# Patient Record
Sex: MALE | Race: WHITE | NOT HISPANIC OR LATINO | Employment: FULL TIME | URBAN - METROPOLITAN AREA
[De-identification: names, ages, dates, MRNs, and addresses within clinical notes are randomized per-mention and may not be internally consistent; named-entity substitution may affect disease eponyms.]

---

## 2017-10-16 ENCOUNTER — OFFICE VISIT (OUTPATIENT)
Dept: FAMILY MEDICINE CLINIC | Facility: CLINIC | Age: 37
End: 2017-10-16

## 2017-10-16 VITALS
HEIGHT: 66 IN | TEMPERATURE: 97.3 F | SYSTOLIC BLOOD PRESSURE: 122 MMHG | BODY MASS INDEX: 33.35 KG/M2 | OXYGEN SATURATION: 97 % | HEART RATE: 85 BPM | DIASTOLIC BLOOD PRESSURE: 86 MMHG | WEIGHT: 207.5 LBS | RESPIRATION RATE: 16 BRPM

## 2017-10-16 DIAGNOSIS — M54.41 CHRONIC BILATERAL LOW BACK PAIN WITH RIGHT-SIDED SCIATICA: Primary | ICD-10-CM

## 2017-10-16 DIAGNOSIS — M79.604 RIGHT LEG PAIN: ICD-10-CM

## 2017-10-16 DIAGNOSIS — G89.29 CHRONIC BILATERAL LOW BACK PAIN WITH RIGHT-SIDED SCIATICA: Primary | ICD-10-CM

## 2017-10-16 PROCEDURE — 99213 OFFICE O/P EST LOW 20 MIN: CPT | Performed by: FAMILY MEDICINE

## 2017-10-16 RX ORDER — NAPROXEN 500 MG/1
500 TABLET ORAL 2 TIMES DAILY WITH MEALS
Qty: 40 TABLET | Refills: 2 | Status: SHIPPED | OUTPATIENT
Start: 2017-10-16 | End: 2017-12-16 | Stop reason: HOSPADM

## 2017-10-16 RX ORDER — CYCLOBENZAPRINE HCL 10 MG
5-10 TABLET ORAL NIGHTLY PRN
Qty: 30 TABLET | Refills: 1 | Status: SHIPPED | OUTPATIENT
Start: 2017-10-16 | End: 2017-12-10

## 2017-10-16 NOTE — PROGRESS NOTES
Assessment/Plan     Problem List Items Addressed This Visit     None      Visit Diagnoses     Chronic bilateral low back pain with right-sided sciatica    -  Primary    Relevant Medications    naproxen (NAPROSYN) 500 MG tablet    cyclobenzaprine (FLEXERIL) 10 MG tablet    Other Relevant Orders    MRI Lumbar Spine Without Contrast    Right leg pain        Relevant Medications    naproxen (NAPROSYN) 500 MG tablet    cyclobenzaprine (FLEXERIL) 10 MG tablet    Other Relevant Orders    MRI Lumbar Spine Without Contrast           Follow up: Return if symptoms worsen or fail to improve.     DISCUSSION  Given chronicity for pain x 1 year and increasing and failure of Ibuprofen for that time and more consistent for 2 months, rec check MRI of lumbar spine.   Naproxen 500 mg BID and flexeril 5-10 mg at hs. Call if not improving or to ER if develops incontinence.     He agrees      MEDICATIONS PRESCRIBED  Requested Prescriptions     Signed Prescriptions Disp Refills   • naproxen (NAPROSYN) 500 MG tablet 40 tablet 2     Sig: Take 1 tablet by mouth 2 (Two) Times a Day With Meals.   • cyclobenzaprine (FLEXERIL) 10 MG tablet 30 tablet 1     Sig: Take 0.5-1 tablets by mouth At Night As Needed for Muscle Spasms. back pain          -------------------------------------------    Subjective     Chief Complaint   Patient presents with   • Back Pain     pt states its getting worse and he is starting to feel it in his legs now.        Back Pain   This is a chronic problem. The current episode started more than 1 year ago (no injury, had been off and on and now daily./ ). The problem occurs constantly. The problem has been gradually worsening since onset. The pain is present in the lumbar spine. The quality of the pain is described as burning. The pain radiates to the right foot and right knee (both legs and right leg is worse.). The pain is moderate. Exacerbated by: depends on move. Associated symptoms include leg pain. Pertinent  "negatives include no bladder incontinence, bowel incontinence, numbness, tingling or weakness. He has tried NSAIDs (600-800 mg Ibuprofen 2 times per day, no P T  eval) for the symptoms. Improvement on treatment: Ibuprofen is not helping anymore.        No wt loss  Wakes him up and decreased sleep due to pain     Ibuprofen use for one yr and still hurting  Daily use of Ibuprofen 2 months      Past Medical History,Medications, Allergies, and social history was reviewed.    Review of Systems   Constitutional: Negative.    HENT: Negative.    Respiratory: Negative.    Cardiovascular: Negative.    Gastrointestinal: Negative.  Negative for bowel incontinence.   Genitourinary: Negative for bladder incontinence.   Musculoskeletal: Positive for arthralgias, back pain and myalgias.   Neurological: Negative.  Negative for tingling, weakness and numbness.   Psychiatric/Behavioral: Negative.        Objective     Vitals:    10/16/17 1505   BP: 122/86   Pulse: 85   Resp: 16   Temp: 97.3 °F (36.3 °C)   TempSrc: Temporal Artery    SpO2: 97%   Weight: 207 lb 8 oz (94.1 kg)   Height: 66\" (167.6 cm)        Physical Exam   Constitutional: He is oriented to person, place, and time. Vital signs are normal. He appears well-developed and well-nourished.   HENT:   Head: Normocephalic and atraumatic.   Right Ear: Hearing, tympanic membrane, external ear and ear canal normal.   Left Ear: Hearing, tympanic membrane, external ear and ear canal normal.   Mouth/Throat: Oropharynx is clear and moist.   Eyes: Conjunctivae, EOM and lids are normal. Pupils are equal, round, and reactive to light.   Neck: Normal range of motion. Neck supple. No thyromegaly present.   Cardiovascular: Normal rate, regular rhythm and normal heart sounds.  Exam reveals no friction rub.    No murmur heard.  Pulmonary/Chest: Effort normal and breath sounds normal. No respiratory distress. He has no wheezes. He has no rales.   Abdominal: Normal appearance and bowel sounds are " normal.   Musculoskeletal: He exhibits no edema.        Lumbar back: He exhibits decreased range of motion and tenderness.   Neurological: He is alert and oriented to person, place, and time. He has normal strength. He exhibits normal muscle tone. Gait (antalgic) abnormal.   Reflex Scores:       Patellar reflexes are 2+ on the right side and 2+ on the left side.  SLR increased pain in low back.    Skin: Skin is warm and dry.   Psychiatric: He has a normal mood and affect. His speech is normal and behavior is normal. Cognition and memory are normal.   Nursing note and vitals reviewed.              Sanjay New MD

## 2017-10-23 ENCOUNTER — HOSPITAL ENCOUNTER (OUTPATIENT)
Dept: MRI IMAGING | Facility: HOSPITAL | Age: 37
Discharge: HOME | End: 2017-10-23
Admitting: FAMILY MEDICINE

## 2017-10-23 DIAGNOSIS — G89.29 CHRONIC BILATERAL LOW BACK PAIN WITH RIGHT-SIDED SCIATICA: ICD-10-CM

## 2017-10-23 DIAGNOSIS — M54.41 CHRONIC BILATERAL LOW BACK PAIN WITH RIGHT-SIDED SCIATICA: ICD-10-CM

## 2017-10-23 DIAGNOSIS — M79.604 RIGHT LEG PAIN: ICD-10-CM

## 2017-10-23 PROCEDURE — 72148 MRI LUMBAR SPINE W/O DYE: CPT

## 2017-10-24 DIAGNOSIS — M51.36 BULGING LUMBAR DISC: Primary | ICD-10-CM

## 2017-10-25 ENCOUNTER — TELEPHONE (OUTPATIENT)
Dept: FAMILY MEDICINE CLINIC | Facility: CLINIC | Age: 37
End: 2017-10-25

## 2017-10-25 NOTE — TELEPHONE ENCOUNTER
----- Message from Gissel Man sent at 10/25/2017  9:19 AM EDT -----  Contact: VINICIO  PATIENT SAID HE GOT A CALL LAST NIGHT WITH RESULTS. HE IS GOING TO BE REFERRED TO A NEUROLOGY.    ASKING IF YOU CAN PRESCRIBE SOMETHING ELSE FOR PAIN, WHAT HE IS ON IS JUST NOT EVEN TOUCHING IT       [facility] IN [place]

## 2017-11-02 ENCOUNTER — TELEPHONE (OUTPATIENT)
Dept: FAMILY MEDICINE CLINIC | Facility: CLINIC | Age: 37
End: 2017-11-02

## 2017-11-02 NOTE — TELEPHONE ENCOUNTER
SPOKE WITH PT AND HE HASN'T EVER TAKEN THIS MED AND WOULD LIKE TO TRY AND PT STATES THAT HE WILL WAIT FOR REFERRAL APPT. INFORMED HIM IT WAS PLACED TODAY AND IT WILL TAKE UP TO WEEK BEFORE HE HEARS SOMETHING AND THEN HE CAN CALL US IF HE DOESN'T HEAR FROM US BY NEXT WK THIS TIME.

## 2017-11-02 NOTE — TELEPHONE ENCOUNTER
----- Message from Dolly Mcfadden sent at 11/2/2017  8:16 AM EDT -----  Contact: New  Patient would like something different prescribed for his back pain. The current medication is not cutting it. He states he cannot take the muscle relaxers because they knock him out for a day and a half. Patient states he was to be referred to a neurosurgeon but has not heard anything regarding an appointment. Please call patient at [phone].

## 2017-11-02 NOTE — TELEPHONE ENCOUNTER
Please call.  Has he ever tried a pain medication called tramadol?  It is a mild painkiller that might be helpful.  However, it could cause him to be a little bit sleepy.  If he would like to try, okay for tramadol 50 mg 1 by mouth every 6 hours as needed for pain #20.  Please call in.  If he is ever had a seizure, do not call him and let me know.    Also, I will be following up on the referral for neurosurgery.

## 2017-11-15 ENCOUNTER — TELEPHONE (OUTPATIENT)
Dept: FAMILY MEDICINE CLINIC | Facility: CLINIC | Age: 37
End: 2017-11-15

## 2017-11-15 NOTE — TELEPHONE ENCOUNTER
----- Message from Miguel Mora sent at 11/15/2017  1:47 PM EST -----  Contact: PATIENT  PATIENT SAID THAT HE WAS SUPPOSED TO HAVE SOMETHING ELSE CALLED IN FOR HIS BACK. PATIENT HAD NAPROXEN PRESCRIBED INITIALLY. A GOOD CALL BACK NUMBER -[phone]

## 2017-11-16 NOTE — TELEPHONE ENCOUNTER
See message from November 2.  Was supposed to have tramadol called in.  Was this called in?  If not, please call in tramadol 50 mg 1 every 6 hours as needed for pain #20

## 2017-11-20 ENCOUNTER — OFFICE VISIT (OUTPATIENT)
Dept: NEUROSURGERY | Facility: CLINIC | Age: 37
End: 2017-11-20

## 2017-11-20 VITALS
WEIGHT: 211 LBS | HEIGHT: 66 IN | TEMPERATURE: 97.9 F | DIASTOLIC BLOOD PRESSURE: 60 MMHG | SYSTOLIC BLOOD PRESSURE: 100 MMHG | BODY MASS INDEX: 33.91 KG/M2

## 2017-11-20 DIAGNOSIS — M43.06 PARS DEFECT OF LUMBAR SPINE: Primary | ICD-10-CM

## 2017-11-20 DIAGNOSIS — M43.17 SPONDYLOLISTHESIS AT L5-S1 LEVEL: ICD-10-CM

## 2017-11-20 PROCEDURE — 99244 OFF/OP CNSLTJ NEW/EST MOD 40: CPT | Performed by: NEUROLOGICAL SURGERY

## 2017-11-20 RX ORDER — IBUPROFEN 200 MG
800 TABLET ORAL ONCE
Status: CANCELLED | OUTPATIENT
Start: 2017-11-20 | End: 2017-11-20

## 2017-11-20 RX ORDER — PREGABALIN 75 MG/1
150 CAPSULE ORAL ONCE
Status: CANCELLED | OUTPATIENT
Start: 2017-11-20 | End: 2017-11-20

## 2017-11-20 RX ORDER — ACETAMINOPHEN 325 MG/1
1000 TABLET ORAL ONCE
Status: CANCELLED | OUTPATIENT
Start: 2017-11-20 | End: 2017-11-20

## 2017-11-20 RX ORDER — OXYCODONE HCL 10 MG/1
10 TABLET, FILM COATED, EXTENDED RELEASE ORAL ONCE
Status: CANCELLED | OUTPATIENT
Start: 2017-11-20 | End: 2017-11-20

## 2017-11-20 RX ORDER — SODIUM CHLORIDE 0.9 % (FLUSH) 0.9 %
1-10 SYRINGE (ML) INJECTION AS NEEDED
Status: CANCELLED | OUTPATIENT
Start: 2017-11-20

## 2017-11-20 RX ORDER — CHLORHEXIDINE GLUCONATE 4 G/100ML
SOLUTION TOPICAL
Qty: 120 ML | Refills: 0 | Status: SHIPPED | OUTPATIENT
Start: 2017-11-20 | End: 2017-12-16 | Stop reason: HOSPADM

## 2017-11-20 NOTE — PROGRESS NOTES
Subjective     Chief Complaint: Low back pain    Patient ID: Alvin Gordon is a 37 y.o. male seen for consultation today at the request of  Sanjay New MD    Back Pain   This is a chronic problem. The current episode started more than 1 year ago. The problem occurs constantly. The problem has been gradually worsening since onset. The pain is present in the lumbar spine and sacro-iliac. The quality of the pain is described as aching. The pain radiates to the right foot and left foot. The pain is at a severity of 8/10. The pain is severe. The pain is the same all the time. The symptoms are aggravated by lying down, bending, position, standing and twisting. Stiffness is present all day. Associated symptoms include headaches and leg pain. Pertinent negatives include no abdominal pain, bladder incontinence, bowel incontinence, chest pain, dysuria, fever, numbness, paresis, paresthesias, pelvic pain, perianal numbness, tingling, weakness or weight loss. He has tried analgesics, chiropractic manipulation, heat and NSAIDs for the symptoms. The treatment provided no relief.       This is a 37-year-old man who presents to my clinic with a long-standing history of progressive low back pain.  He has tried home exercises and anti-inflammatories.  These have been ineffective in alleviating his back pain.  He is recently reporting some leg pain that has become progressive.  His pain is affecting his ability to work and sleep.  He reports that his quality of life is diminished as a result of his refractory, chronic low back pain.    The following portions of the patient's history were reviewed and updated as appropriate: allergies, current medications, past family history, past medical history, past social history, past surgical history and problem list.    Family history:   Family History   Problem Relation Age of Onset   • No Known Problems Mother    • No Known Problems Father        Social history:   Social History      Social History   • Marital status:      Spouse name: N/A   • Number of children: N/A   • Years of education: N/A     Occupational History   • Not on file.     Social History Main Topics   • Smoking status: Never Smoker   • Smokeless tobacco: Never Used   • Alcohol use Yes      Comment: occasionally   • Drug use: No   • Sexual activity: Not on file     Other Topics Concern   • Not on file     Social History Narrative       Review of Systems   Constitutional: Negative for activity change, appetite change, chills, diaphoresis, fatigue, fever, unexpected weight change and weight loss.   HENT: Negative for congestion, dental problem, drooling, ear discharge, ear pain, facial swelling, hearing loss, mouth sores, nosebleeds, postnasal drip, rhinorrhea, sinus pressure, sneezing, sore throat, tinnitus, trouble swallowing and voice change.    Eyes: Negative for photophobia, pain, discharge, redness, itching and visual disturbance.   Respiratory: Negative for apnea, cough, choking, chest tightness, shortness of breath, wheezing and stridor.    Cardiovascular: Negative for chest pain, palpitations and leg swelling.   Gastrointestinal: Negative for abdominal distention, abdominal pain, anal bleeding, blood in stool, bowel incontinence, constipation, diarrhea, nausea, rectal pain and vomiting.   Endocrine: Negative for cold intolerance, heat intolerance, polydipsia, polyphagia and polyuria.   Genitourinary: Negative for bladder incontinence, decreased urine volume, difficulty urinating, dysuria, enuresis, flank pain, frequency, genital sores, hematuria, pelvic pain and urgency.   Musculoskeletal: Positive for arthralgias, back pain and myalgias. Negative for gait problem, joint swelling, neck pain and neck stiffness.   Skin: Negative for color change, pallor, rash and wound.   Allergic/Immunologic: Negative for environmental allergies, food allergies and immunocompromised state.   Neurological: Positive for headaches.  "Negative for dizziness, tingling, tremors, seizures, syncope, facial asymmetry, speech difficulty, weakness, light-headedness, numbness and paresthesias.   Hematological: Negative for adenopathy. Does not bruise/bleed easily.   Psychiatric/Behavioral: Negative for agitation, behavioral problems, confusion, decreased concentration, dysphoric mood, hallucinations, self-injury, sleep disturbance and suicidal ideas. The patient is not nervous/anxious and is not hyperactive.    All other systems reviewed and are negative.      Objective   Blood pressure 100/60, temperature 97.9 °F (36.6 °C), height 66\" (167.6 cm), weight 211 lb (95.7 kg).  Body mass index is 34.06 kg/(m^2).    Physical Exam   Constitutional: He is oriented to person, place, and time. He appears well-developed.  Non-toxic appearance.   HENT:   Head: Normocephalic and atraumatic.   Right Ear: Hearing normal.   Left Ear: Hearing normal.   Nose: Nose normal.   Eyes: Conjunctivae, EOM and lids are normal. Pupils are equal, round, and reactive to light.   Neck: Normal range of motion. No JVD present.   Cardiovascular: Normal rate and regular rhythm.    Pulses:       Radial pulses are 2+ on the right side, and 2+ on the left side.   Pulmonary/Chest: Effort normal. No stridor. No respiratory distress. He has no wheezes.   Musculoskeletal:        Thoracic back: He exhibits no tenderness, no swelling, no pain and no spasm.        Lumbar back: He exhibits decreased range of motion, tenderness and pain. He exhibits no bony tenderness, no swelling and no spasm.   Muscle Group    L          R  Hip Flexor          5          5  Knee Extensor  5          5  ADF                   5          5  APF                   5          5  EHL                   5          5   Neurological: He is alert and oriented to person, place, and time. He has normal strength. He displays normal reflexes. No cranial nerve deficit or sensory deficit. He exhibits normal muscle tone. He displays " a negative Romberg sign. Coordination and gait normal. GCS eye subscore is 4. GCS verbal subscore is 5. GCS motor subscore is 6.   Reflex Scores:       Tricep reflexes are 2+ on the right side and 2+ on the left side.       Bicep reflexes are 2+ on the right side and 2+ on the left side.       Brachioradialis reflexes are 2+ on the right side and 2+ on the left side.       Patellar reflexes are 2+ on the right side and 2+ on the left side.       Achilles reflexes are 1+ on the right side and 1+ on the left side.  Skin: Skin is warm and dry. No rash noted. No erythema.   Psychiatric: He has a normal mood and affect. His behavior is normal. Judgment and thought content normal.   Nursing note and vitals reviewed.        Assessment/Plan     Independent Review of Radiographic Studies:      Available for my review is a MRI of the lumbar spine performed on 10/23/17.  This discloses a grade 2 spondylolisthesis of L5 on S1.  The spondylolisthesis measures approximately 13 mm.  There are bilateral pars defects at L5-S1.  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  There is severe neural foraminal stenosis at L5-S1 bilaterally.    Medical Decision Making:      This is a 37-year-old man with a grade 2 isthmic spondylolisthesis.  He is a candidate for an L5-S1 fusion.  He is interested in surgery.  He has failed conservative treatment area did    Informed consent for an L5-S1 PLIF was obtained from the patient.  He acknowledges risk of nerve root injury, paralysis, weakness, loss of sensation, permanent neurologic deficit, bleeding, infection, spinal fluid leak, iatrogenic instability, failure of benefit of the operation, or need for additional procedures.  All questions were answered.  No guarantees are given or implied.  The patient elects proceed with surgery.    We will schedule him for an L5-S1 PLIF on an elective basis in the near future.    Alvin was seen today for back pain and leg  pain.    Diagnoses and all orders for this visit:    Pars defect of lumbar spine  -     CT Lumbar Spine Without Contrast; Future    Spondylolisthesis at L5-S1 level  -     CT Lumbar Spine Without Contrast; Future      No Follow-up on file.           This document signed by SHEN Jacinto MD November 20, 2017 4:36 PM

## 2017-11-21 PROBLEM — M43.06 PARS DEFECT OF LUMBAR SPINE: Status: ACTIVE | Noted: 2017-11-21

## 2017-11-27 ENCOUNTER — HOSPITAL ENCOUNTER (OUTPATIENT)
Dept: CT IMAGING | Facility: HOSPITAL | Age: 37
Discharge: HOME | End: 2017-11-27
Attending: NEUROLOGICAL SURGERY | Admitting: NEUROLOGICAL SURGERY

## 2017-11-27 DIAGNOSIS — M43.06 PARS DEFECT OF LUMBAR SPINE: ICD-10-CM

## 2017-11-27 DIAGNOSIS — M43.17 SPONDYLOLISTHESIS AT L5-S1 LEVEL: ICD-10-CM

## 2017-11-27 PROCEDURE — 72131 CT LUMBAR SPINE W/O DYE: CPT

## 2017-12-10 ENCOUNTER — APPOINTMENT (OUTPATIENT)
Dept: PREADMISSION TESTING | Facility: HOSPITAL | Age: 37
End: 2017-12-10

## 2017-12-10 VITALS — BODY MASS INDEX: 32.95 KG/M2 | HEIGHT: 66 IN | WEIGHT: 205.03 LBS

## 2017-12-10 DIAGNOSIS — M43.06 PARS DEFECT OF LUMBAR SPINE: ICD-10-CM

## 2017-12-10 LAB
ABO GROUP BLD: NORMAL
ANION GAP SERPL CALCULATED.3IONS-SCNC: 8 MMOL/L (ref 3–11)
BILIRUB UR QL STRIP: NEGATIVE
BLD GP AB SCN SERPL QL: NEGATIVE
BUN BLD-MCNC: 16 MG/DL (ref 9–23)
BUN/CREAT SERPL: 11.4 (ref 7–25)
CALCIUM SPEC-SCNC: 8.8 MG/DL (ref 8.7–10.4)
CHLORIDE SERPL-SCNC: 102 MMOL/L (ref 99–109)
CLARITY UR: CLEAR
CO2 SERPL-SCNC: 28 MMOL/L (ref 20–31)
COLOR UR: YELLOW
CREAT BLD-MCNC: 1.4 MG/DL (ref 0.6–1.3)
DEPRECATED RDW RBC AUTO: 41.3 FL (ref 37–54)
ERYTHROCYTE [DISTWIDTH] IN BLOOD BY AUTOMATED COUNT: 12.7 % (ref 11.3–14.5)
GFR SERPL CREATININE-BSD FRML MDRD: 57 ML/MIN/1.73
GLUCOSE BLD-MCNC: 138 MG/DL (ref 70–100)
GLUCOSE UR STRIP-MCNC: NEGATIVE MG/DL
HBA1C MFR BLD: 5.6 % (ref 4.8–5.6)
HCT VFR BLD AUTO: 46.3 % (ref 38.9–50.9)
HGB BLD-MCNC: 16.6 G/DL (ref 13.1–17.5)
HGB UR QL STRIP.AUTO: NEGATIVE
KETONES UR QL STRIP: NEGATIVE
LEUKOCYTE ESTERASE UR QL STRIP.AUTO: NEGATIVE
MCH RBC QN AUTO: 31.9 PG (ref 27–31)
MCHC RBC AUTO-ENTMCNC: 35.9 G/DL (ref 32–36)
MCV RBC AUTO: 88.9 FL (ref 80–99)
MRSA DNA SPEC QL NAA+PROBE: NEGATIVE
NITRITE UR QL STRIP: NEGATIVE
PH UR STRIP.AUTO: <=5 [PH] (ref 5–8)
PLATELET # BLD AUTO: 209 10*3/MM3 (ref 150–450)
PMV BLD AUTO: 10 FL (ref 6–12)
POTASSIUM BLD-SCNC: 4.2 MMOL/L (ref 3.5–5.5)
PROT UR QL STRIP: ABNORMAL
RBC # BLD AUTO: 5.21 10*6/MM3 (ref 4.2–5.76)
RH BLD: POSITIVE
SODIUM BLD-SCNC: 138 MMOL/L (ref 132–146)
SP GR UR STRIP: 1.02 (ref 1–1.03)
UROBILINOGEN UR QL STRIP: ABNORMAL
WBC NRBC COR # BLD: 7.27 10*3/MM3 (ref 3.5–10.8)

## 2017-12-10 PROCEDURE — 80048 BASIC METABOLIC PNL TOTAL CA: CPT | Performed by: NEUROLOGICAL SURGERY

## 2017-12-10 PROCEDURE — 86901 BLOOD TYPING SEROLOGIC RH(D): CPT | Performed by: NEUROLOGICAL SURGERY

## 2017-12-10 PROCEDURE — 86850 RBC ANTIBODY SCREEN: CPT | Performed by: NEUROLOGICAL SURGERY

## 2017-12-10 PROCEDURE — 81003 URINALYSIS AUTO W/O SCOPE: CPT | Performed by: NEUROLOGICAL SURGERY

## 2017-12-10 PROCEDURE — 87641 MR-STAPH DNA AMP PROBE: CPT | Performed by: ANESTHESIOLOGY

## 2017-12-10 PROCEDURE — 85027 COMPLETE CBC AUTOMATED: CPT | Performed by: NEUROLOGICAL SURGERY

## 2017-12-10 PROCEDURE — 36415 COLL VENOUS BLD VENIPUNCTURE: CPT

## 2017-12-10 PROCEDURE — 83036 HEMOGLOBIN GLYCOSYLATED A1C: CPT | Performed by: ANESTHESIOLOGY

## 2017-12-10 PROCEDURE — 86900 BLOOD TYPING SEROLOGIC ABO: CPT | Performed by: NEUROLOGICAL SURGERY

## 2017-12-10 RX ORDER — TRAMADOL HYDROCHLORIDE 50 MG/1
50 TABLET ORAL EVERY 6 HOURS PRN
COMMUNITY
End: 2017-12-16 | Stop reason: HOSPADM

## 2017-12-10 NOTE — PAT
Chlorhexidine Prescription given during PAT visit, as well as written and verbal instructions given to patient during PAT visit.  Measured for TEDS/SCDS in PAT-     calf measurement     15.5     Length measurement 18

## 2017-12-10 NOTE — DISCHARGE INSTRUCTIONS
The following information and instructions were given:    NPO after MN except sips of water with routine prescribed medication (except blood thinner, diabetes, or weight reducing medication) unless otherwise instructed by your physician.  Do not eat, drink, smoke or chew gum after MN the night before surgery. This also includes no mints.    DO NOT shave for two days before your procedure.  Do not wear makeup.      DO NOT wear fingernail polish (gel/regular) and/or acrylic/artificial nails on the day of surgery.   If a patient had recent manicure and would rather not remove polish or artificial nails, then the minimum requirement is that the polish/artificial nails must be removed from the middle finger on each hand.      If patient was having surgery on an upper extremity, then the patient was instructed that fingernail polish/artificial fingernails must be removed for surgery.  NO EXCEPTIONS.      If patient was having surgery on a lower extremity, then the patient was instructed that toenail polish on both extremities must be removed for surgery.  NO EXCEPTIONS.    Remove all jewelry (advised to go to jeweler if unable to remove).  Jewelry especially rings can no longer be taped for surgery.    Leave anything you consider valuable at home.    Leave your suitcase in the car until after your surgery.    Bring the following with you (if applicable)   -picture ID and insurance cards   -Co-pay/deductible required by insurance   -Medications in the original bottles (not a list) including all over-the-counter  medications if not brought to PAT   -Copy of advance directive, living will or power of  documents if not  brought to PAT   -CPAP or BIPAP mask and tubing (do not bring machine)   -Skin prep instructions sheet   -PAT Pass    Education booklet, brochure, handout or binder given to patient.    Pain Control After Surgery handout given to patient.    Respirex use (handout given to patient) and pneumonia  prevention.    Signs and Symptoms of infection.    DVT Prevention stressing the importance of ambulation.    Patient to apply Chlorhexadine wipes to surgical area (as instructed) the night before procedure and the AM of procedure.    When applicable for ERAS patients (colon, orthropedic), patients were instructed to drink 20 ounces of Gatorade or G2 for diabetics (or until full) the morning of surgery.  The Gatorade or G2 must be consumed at least 3 hours before surgery start time.  No RED Gatorade or G2.  Appropriated ERAS handout given to patient during PAT visit.       Chlorhexidine Prescription given during PAT visit, as well as written and verbal instructions given to patient during PAT visit.

## 2017-12-12 ENCOUNTER — APPOINTMENT (OUTPATIENT)
Dept: GENERAL RADIOLOGY | Facility: HOSPITAL | Age: 37
End: 2017-12-12

## 2017-12-12 ENCOUNTER — ANESTHESIA (OUTPATIENT)
Dept: PERIOP | Facility: HOSPITAL | Age: 37
End: 2017-12-12

## 2017-12-12 ENCOUNTER — HOSPITAL ENCOUNTER (INPATIENT)
Facility: HOSPITAL | Age: 37
LOS: 4 days | Discharge: HOME | End: 2017-12-16
Attending: NEUROLOGICAL SURGERY | Admitting: NEUROLOGICAL SURGERY

## 2017-12-12 ENCOUNTER — ANESTHESIA EVENT (OUTPATIENT)
Dept: PERIOP | Facility: HOSPITAL | Age: 37
End: 2017-12-12

## 2017-12-12 DIAGNOSIS — Z74.09 IMPAIRED MOBILITY AND ADLS: ICD-10-CM

## 2017-12-12 DIAGNOSIS — M43.06 PARS DEFECT OF LUMBAR SPINE: ICD-10-CM

## 2017-12-12 DIAGNOSIS — Z74.09 IMPAIRED FUNCTIONAL MOBILITY, BALANCE, GAIT, AND ENDURANCE: Primary | ICD-10-CM

## 2017-12-12 DIAGNOSIS — Z78.9 IMPAIRED MOBILITY AND ADLS: ICD-10-CM

## 2017-12-12 LAB
ABO GROUP BLD: NORMAL
RH BLD: POSITIVE

## 2017-12-12 PROCEDURE — 25010000002 HYDROMORPHONE PER 4 MG: Performed by: NEUROLOGICAL SURGERY

## 2017-12-12 PROCEDURE — 25010000002 PROPOFOL 10 MG/ML EMULSION: Performed by: NURSE ANESTHETIST, CERTIFIED REGISTERED

## 2017-12-12 PROCEDURE — 76001 HC FLUORO GREATER THAN 1 HOUR: CPT

## 2017-12-12 PROCEDURE — 25010000003 CEFAZOLIN IN DEXTROSE 2-4 GM/100ML-% SOLUTION: Performed by: NEUROLOGICAL SURGERY

## 2017-12-12 PROCEDURE — 25010000002 DEXAMETHASONE PER 1 MG: Performed by: NEUROLOGICAL SURGERY

## 2017-12-12 PROCEDURE — 00QT0ZZ REPAIR SPINAL MENINGES, OPEN APPROACH: ICD-10-PCS | Performed by: NEUROLOGICAL SURGERY

## 2017-12-12 PROCEDURE — 22612 ARTHRD PST TQ 1NTRSPC LUMBAR: CPT | Performed by: NEUROLOGICAL SURGERY

## 2017-12-12 PROCEDURE — 76000 FLUOROSCOPY <1 HR PHYS/QHP: CPT

## 2017-12-12 PROCEDURE — C1713 ANCHOR/SCREW BN/BN,TIS/BN: HCPCS | Performed by: NEUROLOGICAL SURGERY

## 2017-12-12 PROCEDURE — 86900 BLOOD TYPING SEROLOGIC ABO: CPT

## 2017-12-12 PROCEDURE — 25010000002 HYDROMORPHONE PER 4 MG: Performed by: NURSE ANESTHETIST, CERTIFIED REGISTERED

## 2017-12-12 PROCEDURE — 61783 SCAN PROC SPINAL: CPT | Performed by: NEUROLOGICAL SURGERY

## 2017-12-12 PROCEDURE — 25010000002 FENTANYL CITRATE (PF) 100 MCG/2ML SOLUTION: Performed by: NURSE ANESTHETIST, CERTIFIED REGISTERED

## 2017-12-12 PROCEDURE — 25010000002 ONDANSETRON PER 1 MG: Performed by: NURSE ANESTHETIST, CERTIFIED REGISTERED

## 2017-12-12 PROCEDURE — 20926 PR REMV TISSUE FOR GRAFT OTHR: CPT | Performed by: NEUROLOGICAL SURGERY

## 2017-12-12 PROCEDURE — 20931 SP BONE ALGRFT STRUCT ADD-ON: CPT | Performed by: NEUROLOGICAL SURGERY

## 2017-12-12 PROCEDURE — 25010000002 NEOSTIGMINE 10 MG/10ML SOLUTION: Performed by: NURSE ANESTHETIST, CERTIFIED REGISTERED

## 2017-12-12 PROCEDURE — 94799 UNLISTED PULMONARY SVC/PX: CPT

## 2017-12-12 PROCEDURE — C2617 STENT, NON-COR, TEM W/O DEL: HCPCS | Performed by: NEUROLOGICAL SURGERY

## 2017-12-12 PROCEDURE — 22840 INSERT SPINE FIXATION DEVICE: CPT | Performed by: NEUROLOGICAL SURGERY

## 2017-12-12 PROCEDURE — 0SG30AJ FUSION OF LUMBOSACRAL JOINT WITH INTERBODY FUSION DEVICE, POSTERIOR APPROACH, ANTERIOR COLUMN, OPEN APPROACH: ICD-10-PCS | Performed by: NEUROLOGICAL SURGERY

## 2017-12-12 PROCEDURE — 86901 BLOOD TYPING SEROLOGIC RH(D): CPT

## 2017-12-12 DEVICE — SET SCREW 5440030 4.75 TI NS BRK OFF
Type: IMPLANTABLE DEVICE | Site: SPINE LUMBAR | Status: FUNCTIONAL
Brand: CD HORIZON® SPINAL SYSTEM

## 2017-12-12 DEVICE — SEALANT WND FIBRIN TISSEEL VAPOR/HEAT/PREFIL/SYR 10ML: Type: IMPLANTABLE DEVICE | Site: SPINE LUMBAR | Status: FUNCTIONAL

## 2017-12-12 DEVICE — DBM T42200 2.5CMX10CM 2 EACH GRAFTON MAT
Type: IMPLANTABLE DEVICE | Site: SPINE LUMBAR | Status: FUNCTIONAL
Brand: GRAFTON®AND GRAFTON PLUS®DEMINERALIZED BONE MATRIX (DBM)

## 2017-12-12 RX ORDER — PREGABALIN 75 MG/1
75 CAPSULE ORAL EVERY 12 HOURS SCHEDULED
Status: DISCONTINUED | OUTPATIENT
Start: 2017-12-12 | End: 2017-12-16 | Stop reason: HOSPADM

## 2017-12-12 RX ORDER — HYDROMORPHONE HYDROCHLORIDE 1 MG/ML
0.5 INJECTION, SOLUTION INTRAMUSCULAR; INTRAVENOUS; SUBCUTANEOUS
Status: COMPLETED | OUTPATIENT
Start: 2017-12-12 | End: 2017-12-12

## 2017-12-12 RX ORDER — CEFAZOLIN SODIUM 2 G/100ML
2 INJECTION, SOLUTION INTRAVENOUS EVERY 8 HOURS
Status: COMPLETED | OUTPATIENT
Start: 2017-12-12 | End: 2017-12-13

## 2017-12-12 RX ORDER — OXYCODONE HCL 10 MG/1
10 TABLET, FILM COATED, EXTENDED RELEASE ORAL EVERY 8 HOURS PRN
Status: DISCONTINUED | OUTPATIENT
Start: 2017-12-12 | End: 2017-12-16 | Stop reason: HOSPADM

## 2017-12-12 RX ORDER — HYDROMORPHONE HYDROCHLORIDE 1 MG/ML
0.5 INJECTION, SOLUTION INTRAMUSCULAR; INTRAVENOUS; SUBCUTANEOUS
Status: DISCONTINUED | OUTPATIENT
Start: 2017-12-12 | End: 2017-12-14

## 2017-12-12 RX ORDER — FIBRINOGEN HUMAN AND THROMBIN HUMAN 90; 500 [IU]/ML; [IU]/ML
SOLUTION TOPICAL AS NEEDED
Status: DISCONTINUED | OUTPATIENT
Start: 2017-12-12 | End: 2017-12-12 | Stop reason: HOSPADM

## 2017-12-12 RX ORDER — DOCUSATE SODIUM 100 MG/1
100 CAPSULE, LIQUID FILLED ORAL 2 TIMES DAILY PRN
Status: DISCONTINUED | OUTPATIENT
Start: 2017-12-12 | End: 2017-12-16 | Stop reason: HOSPADM

## 2017-12-12 RX ORDER — LIDOCAINE HYDROCHLORIDE AND EPINEPHRINE 5; 5 MG/ML; UG/ML
INJECTION, SOLUTION INFILTRATION; PERINEURAL AS NEEDED
Status: DISCONTINUED | OUTPATIENT
Start: 2017-12-12 | End: 2017-12-12 | Stop reason: HOSPADM

## 2017-12-12 RX ORDER — LIDOCAINE HYDROCHLORIDE 20 MG/ML
INJECTION, SOLUTION INFILTRATION; PERINEURAL AS NEEDED
Status: DISCONTINUED | OUTPATIENT
Start: 2017-12-12 | End: 2017-12-12 | Stop reason: SURG

## 2017-12-12 RX ORDER — ATRACURIUM BESYLATE 10 MG/ML
INJECTION, SOLUTION INTRAVENOUS AS NEEDED
Status: DISCONTINUED | OUTPATIENT
Start: 2017-12-12 | End: 2017-12-12 | Stop reason: SURG

## 2017-12-12 RX ORDER — NALOXONE HCL 0.4 MG/ML
0.4 VIAL (ML) INJECTION
Status: DISCONTINUED | OUTPATIENT
Start: 2017-12-12 | End: 2017-12-14

## 2017-12-12 RX ORDER — ONDANSETRON 4 MG/1
4 TABLET, FILM COATED ORAL EVERY 6 HOURS PRN
Status: DISCONTINUED | OUTPATIENT
Start: 2017-12-12 | End: 2017-12-16 | Stop reason: HOSPADM

## 2017-12-12 RX ORDER — ROCURONIUM BROMIDE 10 MG/ML
INJECTION, SOLUTION INTRAVENOUS AS NEEDED
Status: DISCONTINUED | OUTPATIENT
Start: 2017-12-12 | End: 2017-12-12 | Stop reason: SURG

## 2017-12-12 RX ORDER — ONDANSETRON 2 MG/ML
4 INJECTION INTRAMUSCULAR; INTRAVENOUS ONCE AS NEEDED
Status: DISCONTINUED | OUTPATIENT
Start: 2017-12-12 | End: 2017-12-12 | Stop reason: HOSPADM

## 2017-12-12 RX ORDER — PROPOFOL 10 MG/ML
VIAL (ML) INTRAVENOUS AS NEEDED
Status: DISCONTINUED | OUTPATIENT
Start: 2017-12-12 | End: 2017-12-12 | Stop reason: SURG

## 2017-12-12 RX ORDER — OXYCODONE HCL 10 MG/1
10 TABLET, FILM COATED, EXTENDED RELEASE ORAL ONCE
Status: COMPLETED | OUTPATIENT
Start: 2017-12-12 | End: 2017-12-12

## 2017-12-12 RX ORDER — FENTANYL CITRATE 50 UG/ML
50 INJECTION, SOLUTION INTRAMUSCULAR; INTRAVENOUS
Status: DISCONTINUED | OUTPATIENT
Start: 2017-12-12 | End: 2017-12-12 | Stop reason: HOSPADM

## 2017-12-12 RX ORDER — NEOSTIGMINE METHYLSULFATE 1 MG/ML
INJECTION, SOLUTION INTRAVENOUS AS NEEDED
Status: DISCONTINUED | OUTPATIENT
Start: 2017-12-12 | End: 2017-12-12 | Stop reason: SURG

## 2017-12-12 RX ORDER — GLYCOPYRROLATE 0.2 MG/ML
INJECTION INTRAMUSCULAR; INTRAVENOUS AS NEEDED
Status: DISCONTINUED | OUTPATIENT
Start: 2017-12-12 | End: 2017-12-12 | Stop reason: SURG

## 2017-12-12 RX ORDER — FAMOTIDINE 20 MG/1
20 TABLET, FILM COATED ORAL ONCE
Status: COMPLETED | OUTPATIENT
Start: 2017-12-12 | End: 2017-12-12

## 2017-12-12 RX ORDER — ONDANSETRON 2 MG/ML
4 INJECTION INTRAMUSCULAR; INTRAVENOUS EVERY 6 HOURS PRN
Status: DISCONTINUED | OUTPATIENT
Start: 2017-12-12 | End: 2017-12-16 | Stop reason: HOSPADM

## 2017-12-12 RX ORDER — MAGNESIUM HYDROXIDE 1200 MG/15ML
LIQUID ORAL AS NEEDED
Status: DISCONTINUED | OUTPATIENT
Start: 2017-12-12 | End: 2017-12-12 | Stop reason: HOSPADM

## 2017-12-12 RX ORDER — SODIUM CHLORIDE, SODIUM LACTATE, POTASSIUM CHLORIDE, CALCIUM CHLORIDE 600; 310; 30; 20 MG/100ML; MG/100ML; MG/100ML; MG/100ML
9 INJECTION, SOLUTION INTRAVENOUS CONTINUOUS
Status: DISCONTINUED | OUTPATIENT
Start: 2017-12-12 | End: 2017-12-15

## 2017-12-12 RX ORDER — SODIUM CHLORIDE 0.9 % (FLUSH) 0.9 %
1-10 SYRINGE (ML) INJECTION AS NEEDED
Status: DISCONTINUED | OUTPATIENT
Start: 2017-12-12 | End: 2017-12-15

## 2017-12-12 RX ORDER — LIDOCAINE HYDROCHLORIDE 10 MG/ML
2 INJECTION, SOLUTION INFILTRATION; PERINEURAL ONCE
Status: COMPLETED | OUTPATIENT
Start: 2017-12-12 | End: 2017-12-12

## 2017-12-12 RX ORDER — DIAZEPAM 5 MG/ML
5 INJECTION, SOLUTION INTRAMUSCULAR; INTRAVENOUS EVERY 4 HOURS PRN
Status: DISCONTINUED | OUTPATIENT
Start: 2017-12-12 | End: 2017-12-14

## 2017-12-12 RX ORDER — SODIUM CHLORIDE 0.9 % (FLUSH) 0.9 %
1-10 SYRINGE (ML) INJECTION AS NEEDED
Status: DISCONTINUED | OUTPATIENT
Start: 2017-12-12 | End: 2017-12-12

## 2017-12-12 RX ORDER — ACETAMINOPHEN 500 MG
1000 TABLET ORAL ONCE
Status: COMPLETED | OUTPATIENT
Start: 2017-12-12 | End: 2017-12-12

## 2017-12-12 RX ORDER — FENTANYL CITRATE 50 UG/ML
INJECTION, SOLUTION INTRAMUSCULAR; INTRAVENOUS AS NEEDED
Status: DISCONTINUED | OUTPATIENT
Start: 2017-12-12 | End: 2017-12-12 | Stop reason: SURG

## 2017-12-12 RX ORDER — CEFAZOLIN SODIUM 2 G/100ML
2 INJECTION, SOLUTION INTRAVENOUS ONCE
Status: COMPLETED | OUTPATIENT
Start: 2017-12-12 | End: 2017-12-12

## 2017-12-12 RX ORDER — BISACODYL 10 MG
10 SUPPOSITORY, RECTAL RECTAL DAILY PRN
Status: DISCONTINUED | OUTPATIENT
Start: 2017-12-12 | End: 2017-12-16 | Stop reason: HOSPADM

## 2017-12-12 RX ORDER — IBUPROFEN 800 MG/1
800 TABLET ORAL ONCE
Status: COMPLETED | OUTPATIENT
Start: 2017-12-12 | End: 2017-12-12

## 2017-12-12 RX ORDER — SENNA AND DOCUSATE SODIUM 50; 8.6 MG/1; MG/1
1 TABLET, FILM COATED ORAL NIGHTLY PRN
Status: DISCONTINUED | OUTPATIENT
Start: 2017-12-12 | End: 2017-12-16 | Stop reason: HOSPADM

## 2017-12-12 RX ORDER — ONDANSETRON 2 MG/ML
INJECTION INTRAMUSCULAR; INTRAVENOUS AS NEEDED
Status: DISCONTINUED | OUTPATIENT
Start: 2017-12-12 | End: 2017-12-12 | Stop reason: SURG

## 2017-12-12 RX ORDER — PREGABALIN 75 MG/1
150 CAPSULE ORAL ONCE
Status: COMPLETED | OUTPATIENT
Start: 2017-12-12 | End: 2017-12-12

## 2017-12-12 RX ORDER — OXYCODONE HYDROCHLORIDE AND ACETAMINOPHEN 5; 325 MG/1; MG/1
1 TABLET ORAL EVERY 4 HOURS PRN
Status: DISCONTINUED | OUTPATIENT
Start: 2017-12-12 | End: 2017-12-14

## 2017-12-12 RX ADMIN — OXYCODONE AND ACETAMINOPHEN 1 TABLET: 5; 325 TABLET ORAL at 20:50

## 2017-12-12 RX ADMIN — HYDROMORPHONE HYDROCHLORIDE 0.5 MG: 1 INJECTION, SOLUTION INTRAMUSCULAR; INTRAVENOUS; SUBCUTANEOUS at 12:30

## 2017-12-12 RX ADMIN — ATRACURIUM BESYLATE 10 MG: 10 INJECTION, SOLUTION INTRAVENOUS at 10:53

## 2017-12-12 RX ADMIN — ROCURONIUM BROMIDE 10 MG: 10 INJECTION INTRAVENOUS at 08:17

## 2017-12-12 RX ADMIN — FENTANYL CITRATE 50 MCG: 50 INJECTION, SOLUTION INTRAMUSCULAR; INTRAVENOUS at 12:05

## 2017-12-12 RX ADMIN — FENTANYL CITRATE 25 MCG: 50 INJECTION, SOLUTION INTRAMUSCULAR; INTRAVENOUS at 11:55

## 2017-12-12 RX ADMIN — ATRACURIUM BESYLATE 10 MG: 10 INJECTION, SOLUTION INTRAVENOUS at 09:54

## 2017-12-12 RX ADMIN — PREGABALIN 150 MG: 75 CAPSULE ORAL at 07:13

## 2017-12-12 RX ADMIN — ACETAMINOPHEN 1000 MG: 500 TABLET ORAL at 07:13

## 2017-12-12 RX ADMIN — IBUPROFEN 800 MG: 800 TABLET ORAL at 07:13

## 2017-12-12 RX ADMIN — SODIUM CHLORIDE, POTASSIUM CHLORIDE, SODIUM LACTATE AND CALCIUM CHLORIDE 9 ML/HR: 600; 310; 30; 20 INJECTION, SOLUTION INTRAVENOUS at 07:00

## 2017-12-12 RX ADMIN — HYDROMORPHONE HYDROCHLORIDE 0.5 MG: 1 INJECTION, SOLUTION INTRAMUSCULAR; INTRAVENOUS; SUBCUTANEOUS at 12:40

## 2017-12-12 RX ADMIN — PREGABALIN 75 MG: 75 CAPSULE ORAL at 20:51

## 2017-12-12 RX ADMIN — HYDROMORPHONE HYDROCHLORIDE 0.5 MG: 2 INJECTION INTRAMUSCULAR; INTRAVENOUS; SUBCUTANEOUS at 18:01

## 2017-12-12 RX ADMIN — OXYCODONE AND ACETAMINOPHEN 1 TABLET: 5; 325 TABLET ORAL at 16:13

## 2017-12-12 RX ADMIN — HYDROMORPHONE HYDROCHLORIDE 0.5 MG: 1 INJECTION, SOLUTION INTRAMUSCULAR; INTRAVENOUS; SUBCUTANEOUS at 12:35

## 2017-12-12 RX ADMIN — OXYCODONE HYDROCHLORIDE 10 MG: 10 TABLET, FILM COATED, EXTENDED RELEASE ORAL at 07:13

## 2017-12-12 RX ADMIN — ATRACURIUM BESYLATE 10 MG: 10 INJECTION, SOLUTION INTRAVENOUS at 08:57

## 2017-12-12 RX ADMIN — CEFAZOLIN SODIUM 2 G: 2 INJECTION, SOLUTION INTRAVENOUS at 07:31

## 2017-12-12 RX ADMIN — ROCURONIUM BROMIDE 40 MG: 10 INJECTION INTRAVENOUS at 07:35

## 2017-12-12 RX ADMIN — OXYCODONE HYDROCHLORIDE 10 MG: 10 TABLET, FILM COATED, EXTENDED RELEASE ORAL at 14:51

## 2017-12-12 RX ADMIN — ONDANSETRON 4 MG: 2 INJECTION INTRAMUSCULAR; INTRAVENOUS at 11:24

## 2017-12-12 RX ADMIN — FENTANYL CITRATE 25 MCG: 50 INJECTION, SOLUTION INTRAMUSCULAR; INTRAVENOUS at 11:44

## 2017-12-12 RX ADMIN — ATRACURIUM BESYLATE 10 MG: 10 INJECTION, SOLUTION INTRAVENOUS at 10:25

## 2017-12-12 RX ADMIN — CEFAZOLIN SODIUM 2 G: 2 INJECTION, SOLUTION INTRAVENOUS at 16:14

## 2017-12-12 RX ADMIN — NEOSTIGMINE METHYLSULFATE 2.5 MG: 1 INJECTION, SOLUTION INTRAVENOUS at 11:41

## 2017-12-12 RX ADMIN — ATRACURIUM BESYLATE 10 MG: 10 INJECTION, SOLUTION INTRAVENOUS at 09:26

## 2017-12-12 RX ADMIN — PROPOFOL 250 MG: 10 INJECTION, EMULSION INTRAVENOUS at 07:35

## 2017-12-12 RX ADMIN — FAMOTIDINE 20 MG: 20 TABLET, FILM COATED ORAL at 07:13

## 2017-12-12 RX ADMIN — GLYCOPYRROLATE 0.4 MG: 0.2 INJECTION, SOLUTION INTRAMUSCULAR; INTRAVENOUS at 11:41

## 2017-12-12 RX ADMIN — LIDOCAINE HYDROCHLORIDE 0.2 ML: 10 INJECTION, SOLUTION EPIDURAL; INFILTRATION; INTRACAUDAL; PERINEURAL at 07:00

## 2017-12-12 RX ADMIN — DEXAMETHASONE SODIUM PHOSPHATE 10 MG: 4 INJECTION, SOLUTION INTRAMUSCULAR; INTRAVENOUS at 07:39

## 2017-12-12 RX ADMIN — HYDROMORPHONE HYDROCHLORIDE 0.5 MG: 1 INJECTION, SOLUTION INTRAMUSCULAR; INTRAVENOUS; SUBCUTANEOUS at 12:25

## 2017-12-12 RX ADMIN — LIDOCAINE HYDROCHLORIDE 50 MG: 20 INJECTION, SOLUTION INFILTRATION; PERINEURAL at 07:34

## 2017-12-12 NOTE — BRIEF OP NOTE
LUMBAR DISCECTOMY POSTERIOR WITH FUSION INSTRUMENTATION  Progress Note    Alvin Gordon  12/12/2017    Pre-op Diagnosis:   Pars defect of lumbar spine [M43.06]       Post-Op Diagnosis Codes:     * Pars defect of lumbar spine [M43.06]    Procedure/CPT® Codes:  KS ARTHRODESIS POSTERIOR/POSTEROLATERAL LUMBAR [49956]    Procedure(s):  L5-S1 LUMBAR LAMINECTOMY POSTERIOR LUMBAR INTERBODY FUSION    Surgeon(s):  Kevin Jacinto MD    Anesthesia: General    Staff:   Circulator: Elinor Crawford RN  Radiology Technologist: Suhail Kovacs, RT  Scrub Person: Rai Butcher; Aneta Bryant  Vendor Representative: Scott Morales; Partha Thompson; Waldo Sinha  Nursing Assistant: Lona Drew  Assistant: Kyra Nogueira PA-C  Orientee: Angela Mason RN; Susie Batista    Estimated Blood Loss: 200 mL    Urine Voided: 300 mL    Specimens:                None      Drains:   Drain/Device Site 12/12/17 1129 midline lumbar spine collapsible closed device (Active)       Urethral Catheter 12/12/17 0750 100% silicone 16 (Active)           Findings: Small iatrogenic durotomy was successfully closed with a suture and muscle graft.  Postprocedure CT scan demonstrated satisfactory position of the implanted screws.  It was not possible to place an interbody device due to the configuration of the L5 and S1 disc space    Complications: None      Kevin Jacinto MD     Date: 12/12/2017  Time: 11:48 AM

## 2017-12-12 NOTE — INTERVAL H&P NOTE
"[facility] Pre-op    Full history and physical note from office reviewed and updated.  See office note attached.    /91 (BP Location: Right arm, Patient Position: Lying)  Pulse 76  Temp 98.3 °F (36.8 °C) (Temporal Artery )   Resp 16  Ht 167.6 cm (66\")  Wt 93 kg (205 lb)  SpO2 97%  BMI 33.09 kg/m2     Physical Exam:     Cardiovascular - regular rate and rhythm. Normal s1/s2. No heaves, rubs, gallops or murmurs. No peripheral edema     Respiratory - lungs clear to ascultation bilaterally. Unlabored respirations. No wheezes, crackles or rales    IMM:  Influenza:  denies  Pneumococcal:  denies  Tetanus:  denies    Cancer Staging (if applicable)  Cancer Patient: __ yes _x_no __unknown__N/A; If yes, clinical stage T:__ N:__M:__, stage group or __N/A    CYNDI Edwards 12/12/2017 7:02 AM    "

## 2017-12-12 NOTE — ANESTHESIA POSTPROCEDURE EVALUATION
Patient: Alvin Gordon    Procedure Summary     Date Anesthesia Start Anesthesia Stop Room / Location    12/12/17 0731 1206 [facility] / [facility]       Procedure Diagnosis Surgeon Provider    L5-S1 LUMBAR LAMINECTOMY POSTERIOR LUMBAR INTERBODY FUSION (N/A Spine Lumbar) Pars defect of lumbar spine  (Pars defect of lumbar spine [M43.06]) MD Brendan Godfrey MD          Anesthesia Type: general  Last vitals  BP   128/91 (12/12/17 0702)   Temp   98.3 °F (36.8 °C) (12/12/17 0702)   Pulse   76 (12/12/17 0702)   Resp   16 (12/12/17 0702)     SpO2   97 % (12/12/17 0702)     Post Anesthesia Care and Evaluation    Patient location during evaluation: PACU  Patient participation: complete - patient participated  Level of consciousness: awake and alert  Pain score: 0  Pain management: adequate  Airway patency: patent  Anesthetic complications: No anesthetic complications  PONV Status: none  Cardiovascular status: hemodynamically stable and acceptable  Respiratory status: nonlabored ventilation, acceptable and nasal cannula  Hydration status: acceptable

## 2017-12-12 NOTE — ANESTHESIA PREPROCEDURE EVALUATION
Anesthesia Evaluation     Patient summary reviewed and Nursing notes reviewed   NPO Solid Status: > 8 hours  NPO Liquid Status: > 8 hours     Airway   Mallampati: II  TM distance: >3 FB  Neck ROM: full  no difficulty expected  Dental      Pulmonary    Cardiovascular         Neuro/Psych  (+) headaches,      ROS Comment: PARS DEFECT L5S1   SPONYLO'SIS L5 S1  GI/Hepatic/Renal/Endo    (+)  renal disease (Elevated creat 1.4 ),     Musculoskeletal     (+) back pain,   Abdominal    Substance History      OB/GYN          Other        ROS/Med Hx Other: Slow to wake post GA                              Anesthesia Plan    ASA 2     general   (Poss FRANNIE; Snores (Slow to wake) )  intravenous induction   Anesthetic plan and risks discussed with patient.    Plan discussed with CRNA.

## 2017-12-12 NOTE — ANESTHESIA PROCEDURE NOTES
Airway  Urgency: elective    Airway not difficult    General Information and Staff    Patient location during procedure: OR    Indications and Patient Condition  Indications for airway management: airway protection    Preoxygenated: yes  Mask difficulty assessment: 1 - vent by mask    Final Airway Details  Final airway type: endotracheal airway      Successful airway: ETT  Cuffed: yes   Successful intubation technique: direct laryngoscopy  Facilitating devices/methods: intubating stylet  Endotracheal tube insertion site: oral  Blade: Sharp  Blade size: #2  ETT size: 7.5 mm  Cormack-Lehane Classification: grade I - full view of glottis  Placement verified by: chest auscultation and capnometry   Measured from: lips  ETT to lips (cm): 21  Number of attempts at approach: 1

## 2017-12-12 NOTE — OP NOTE
Preoperative diagnosis: Grade 1 spondylolisthesis, bilateral pars defect, lumbosacral radiculopathy  Postoperative diagnosis: Same    Indication for procedure: This is a 37-year-old man who presented to my clinic with chief complaints of severe, refractory low back pain and bilateral L5 radiculopathy.  He was evaluated with an MRI of the lumbar spine which demonstrated a grade 1 spondylolisthesis.  The CT scan was then performed which confirmed the presence of bilateral pars defects with severe bilateral neural foraminal stenosis.  Operative intervention is indicated.    Informed consent for this procedure was obtained from the patient.  He acknowledges risk of nerve root injury, paralysis, weakness, loss of sensation, permanent neurologic disability, bleeding, infection, spinal fluid leak, iatrogenic instability, instrumentation failure, failure of benefit of the operation, or need for additional procedures.  All questions were answered prior to beginning the procedure.  No guarantees were given or implied.  The patient elects to proceed with surgery.    Procedure in detail: The patient was identified in the preoperative holding area and brought to the operating suite where he underwent the uneventful induction of general endotracheal anesthesia. Venodyne's and a Berger catheter were then placed by the nursing staff.  The patient was gently rotated to the prone position on the OSI table.  The patient's lumbar spine was then shaved, prepped and draped in usual sterile fashion.  A timeout was performed.  Intravenous antibiotics were administered.  A midline, vertically oriented skin incision over the L5-S1 interspace was then made after anesthetizing the skin.  The stasis was achieved using bipolar and monopolar electrocautery.  Self-retaining retractors were placed and sequentially advanced.  The inferior aspect of the L4 spinous process was then exposed.  The lamina and spinous process at L5 and S1 were exposed out  to the facet joints bilaterally.  The reference array was affixed to the inferior aspect of the L4 spinous process.  A 3-dimensional rotational spin was then obtained using the alarm.  This was navigated with the navigation system.  Entry points into the pedicles at L5 and S1 were identified and marked without holes bilaterally.  Under stereotactic navigation, 6.5 mm taps were used at L5 and 5.5 mm Used at S1.  A Total of 46.5 x 45 Mm Medtronic Solera Screws Were Then Placed after Confirming with the Ball-Tipped Feeler That the Screw Trajectories Were Satisfactory and There Was No Cortical Breech.    The Facet Joints Were Then Extensively Decorticated and the Superior Articular Facet at S1 Was Removed Bilaterally.  I Identified the Inferior Aspect of the L5 Pedicles Bilaterally and Is Able to Visualize the Nerve Roots Exiting out the Foramen Bilaterally.  Generous Foraminotomies Were Then Achieved Using the Kerrison Rongeurs.  The shortest interbody device that we had was 22 mm, and one measuring on the CT scan, the overlap between the inferior aspect of the L5 vertebral body and the superior aspect of the S1 vertebral body was 16 mm, so I did not think that I was going to be able to get an interbody device that would seat suitably in the disc space.  Furthermore, it did appear to me that the patient was autofused and probably did not need in her bodies, and I had already achieved the requisite neural element decompression by performing bilateral foraminotomies, so elected to terminate the procedure.    When I was affixing the ingris on the right side, the Fabian elevator slipped and caused a small durotomy over the L5-S1 interspace.  Harvested a small muscle pledget, and I was able to easily close the dura using a single 6-0 Prolene suture and the muscle pledget over the durotomy.  A Valsalva maneuver was then performed to 40 mmHg and no CSF egress was noted.    The facet joints and lamina that remained were extensively  decorticated, and the harvested autograft was combined with allograft (DBM) and packed over the facet joints and lamina bilaterally.  Avitene was used to make a blood patch laid over the muscle pledget, and then Tisseel was further applied to complete the CSF leak repair.    The setscrews were then tightened according to the 's specifications.  Postprocedure CT scan demonstrated satisfactory position of the implanted hardware and satisfactory neural element decompression.    The wound was copiously irrigated with bacitracin saline.  A 10 flat PING drain was tunneled out through separate stab incision and left in the subfascial space.  The fascia and skin were then closed in layers.  Glue, and a sterile dressing were then applied.    Sponge, instrument, and needle counts were all correct at the conclusion of the case.  I performed this procedure with the assistance of Kyra Nogueira, physician assistant.

## 2017-12-13 LAB — GLUCOSE BLDC GLUCOMTR-MCNC: 123 MG/DL (ref 70–130)

## 2017-12-13 PROCEDURE — 25010000003 CEFAZOLIN IN DEXTROSE 2-4 GM/100ML-% SOLUTION: Performed by: NEUROLOGICAL SURGERY

## 2017-12-13 PROCEDURE — 25010000002 HYDROMORPHONE PER 4 MG: Performed by: NEUROLOGICAL SURGERY

## 2017-12-13 PROCEDURE — 99024 POSTOP FOLLOW-UP VISIT: CPT | Performed by: NEUROLOGICAL SURGERY

## 2017-12-13 PROCEDURE — 82962 GLUCOSE BLOOD TEST: CPT

## 2017-12-13 RX ADMIN — HYDROMORPHONE HYDROCHLORIDE 0.5 MG: 2 INJECTION INTRAMUSCULAR; INTRAVENOUS; SUBCUTANEOUS at 07:30

## 2017-12-13 RX ADMIN — PREGABALIN 75 MG: 75 CAPSULE ORAL at 09:20

## 2017-12-13 RX ADMIN — OXYCODONE AND ACETAMINOPHEN 1 TABLET: 5; 325 TABLET ORAL at 09:20

## 2017-12-13 RX ADMIN — CEFAZOLIN SODIUM 2 G: 2 INJECTION, SOLUTION INTRAVENOUS at 00:34

## 2017-12-13 RX ADMIN — HYDROMORPHONE HYDROCHLORIDE 0.5 MG: 2 INJECTION INTRAMUSCULAR; INTRAVENOUS; SUBCUTANEOUS at 15:54

## 2017-12-13 RX ADMIN — SODIUM CHLORIDE, POTASSIUM CHLORIDE, SODIUM LACTATE AND CALCIUM CHLORIDE 9 ML/HR: 600; 310; 30; 20 INJECTION, SOLUTION INTRAVENOUS at 09:25

## 2017-12-13 RX ADMIN — OXYCODONE AND ACETAMINOPHEN 1 TABLET: 5; 325 TABLET ORAL at 17:12

## 2017-12-13 RX ADMIN — PREGABALIN 75 MG: 75 CAPSULE ORAL at 22:16

## 2017-12-13 RX ADMIN — OXYCODONE AND ACETAMINOPHEN 1 TABLET: 5; 325 TABLET ORAL at 22:16

## 2017-12-13 RX ADMIN — OXYCODONE AND ACETAMINOPHEN 1 TABLET: 5; 325 TABLET ORAL at 04:23

## 2017-12-13 RX ADMIN — HYDROMORPHONE HYDROCHLORIDE 0.5 MG: 2 INJECTION INTRAMUSCULAR; INTRAVENOUS; SUBCUTANEOUS at 12:45

## 2017-12-13 RX ADMIN — HYDROMORPHONE HYDROCHLORIDE 0.5 MG: 2 INJECTION INTRAMUSCULAR; INTRAVENOUS; SUBCUTANEOUS at 18:04

## 2017-12-13 NOTE — PLAN OF CARE
Problem: Patient Care Overview (Adult)  Goal: Plan of Care Review  Outcome: Ongoing (interventions implemented as appropriate)    12/13/17 1732   Coping/Psychosocial Response Interventions   Plan Of Care Reviewed With patient   Patient Care Overview   Progress improving   Outcome Evaluation   Outcome Summary/Follow up Plan Patient ambulated in hallway, prn pain med given as ordered. VSS, 90 out of drain. Berger d/c'd voiding spontaneously.          Problem: Perioperative Period (Adult)  Goal: Signs and Symptoms of Listed Potential Problems Will be Absent or Manageable (Perioperative Period)  Outcome: Ongoing (interventions implemented as appropriate)    12/13/17 1732   Perioperative Period   Problems Assessed (Perioperative Period) pain   Problems Present (Perioperative Period) pain         Problem: Laminectomy/Foraminotomy/Discectomy (Adult)  Goal: Signs and Symptoms of Listed Potential Problems Will be Absent or Manageable (Laminectomy/Foraminotomy/Discectomy)  Outcome: Ongoing (interventions implemented as appropriate)    12/13/17 1732   Laminectomy/Foraminotomy/Discectomy   Problems Assessed (Laminectomy/Laminotomy/Discectomy) all   Problems Present (Laminectomy/Laminotomy/Discectomy) pain

## 2017-12-13 NOTE — PROGRESS NOTES
Discharge Planning Assessment  [facility]     Patient Name: Alvin Gordon  MRN: [id number]  Today's Date: 12/13/2017    Admit Date: 12/12/2017          Discharge Needs Assessment       12/13/17 1137    Living Environment    Lives With spouse;child(dickson), dependent    Living Arrangements house    Transportation Available car;family or friend will provide    Living Environment    Provides Primary Care For child(dickson)    Quality Of Family Relationships supportive    Able to Return to Prior Living Arrangements yes    Discharge Needs Assessment    Equipment Currently Used at Home none    Equipment Needed After Discharge none            Discharge Plan       12/13/17 1138    Case Management/Social Work Plan    Plan Home    Patient/Family In Agreement With Plan yes    Additional Comments Spoke with patient at home. He lives with his wife and kids in a two story house in [place]. PTA he was independent with ADL's and mobility. Denies any HH/DME needs at this time. [facility] available to transport at [place]. Goal is to return home. CM will follow.         Discharge Placement     No information found                Demographic Summary       12/13/17 1137    Referral Information    Arrived From home or self-care    Reason For Consult discharge planning            Functional Status       12/13/17 1137    Functional Status Prior    Ambulation 0-->independent    Transferring 0-->independent    Toileting 0-->independent    Bathing 0-->independent    Dressing 0-->independent    Eating 0-->independent    Communication 0-->understands/communicates without difficulty    Swallowing 0-->swallows foods/liquids without difficulty    Activity Tolerance    Usual Activity Tolerance good    Current Activity Tolerance fair            Psychosocial     None            Abuse/Neglect     None            Legal     None            Substance Abuse     None            Patient Forms     None          Vi Chau RN

## 2017-12-13 NOTE — PAYOR COMM NOTE
"Updated clinicals for continued hospitalization post surgery  auth #[id number]    Nicole Jean RN  Utilization Review  [phone]  Fax [phone]    Papo Gordondavid VELASQUEZ (37 y.o. Male)     Date of Birth Social Security Number Address Home Phone MRN    1980  [address]  [facility] [phone] [id number]    Amish Marital Status          Roman Catholic        Admission Date Admission Type Admitting Provider Attending Provider Department, Room/Bed    12/12/17 Elective Kevin Jacinto MD Newman, Charles Benjamin, MD [address], S374/1    Discharge Date Discharge Disposition Discharge Destination                      Attending Provider: Kevin Jacinto MD     Allergies:  No Known Allergies    Isolation:  None   Infection:  None   Code Status:  FULL    Ht:  167.6 cm (66\")   Wt:  93 kg (205 lb)    Admission Cmt:  None   Principal Problem:  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                 Active Insurance as of 12/12/2017     Primary Coverage     Payor Plan Insurance Group Employer/Plan Group    [employer] BLUE CROSS BLUE SHIELD PPO [id number]     Payor Plan Address Payor Plan Phone Number Effective From Effective To    [address] [phone] 1/1/2008     [place] [address]       Subscriber Name Subscriber Birth Date Member ID       ALVIN GORDON 1980 [id number]                 Emergency Contacts      (Rel.) Home Phone Work Phone Mobile Phone    Robyn Gordon (Mother) [phone] -- --    MayLyric (Spouse) [phone] -- --            Insurance Information                [employer] BLUE CROSS/[employer] BLUE CROSS BLUE SHI[employer] PPO Phone: [phone]    Subscriber: Alvin Gordon Subscriber#: [id number]    Group#: [id number] Precert#:              History & Physical      Kevin Jacinto MD at 11/20/2017  3:30 PM          Subjective     Chief Complaint: Low back " pain    Patient ID: Alvin Gordon is a 37 y.o. male seen for consultation today at the request of  Sanajy New MD    Back Pain   This is a chronic problem. The current episode started more than 1 year ago. The problem occurs constantly. The problem has been gradually worsening since onset. The pain is present in the lumbar spine and sacro-iliac. The quality of the pain is described as aching. The pain radiates to the right foot and left foot. The pain is at a severity of 8/10. The pain is severe. The pain is the same all the time. The symptoms are aggravated by lying down, bending, position, standing and twisting. Stiffness is present all day. Associated symptoms include headaches and leg pain. Pertinent negatives include no abdominal pain, bladder incontinence, bowel incontinence, chest pain, dysuria, fever, numbness, paresis, paresthesias, pelvic pain, perianal numbness, tingling, weakness or weight loss. He has tried analgesics, chiropractic manipulation, heat and NSAIDs for the symptoms. The treatment provided no relief.       This is a 37-year-old man who presents to my clinic with a long-standing history of progressive low back pain.  He has tried home exercises and anti-inflammatories.  These have been ineffective in alleviating his back pain.  He is recently reporting some leg pain that has become progressive.  His pain is affecting his ability to work and sleep.  He reports that his quality of life is diminished as a result of his refractory, chronic low back pain.    The following portions of the patient's history were reviewed and updated as appropriate: allergies, current medications, past family history, past medical history, past social history, past surgical history and problem list.    Family history:   Family History   Problem Relation Age of Onset   • No Known Problems Mother    • No Known Problems Father        Social history:   Social History     Social History   • Marital status:       Spouse name: N/A   • Number of children: N/A   • Years of education: N/A     Occupational History   • Not on file.     Social History Main Topics   • Smoking status: Never Smoker   • Smokeless tobacco: Never Used   • Alcohol use Yes      Comment: occasionally   • Drug use: No   • Sexual activity: Not on file     Other Topics Concern   • Not on file     Social History Narrative       Review of Systems   Constitutional: Negative for activity change, appetite change, chills, diaphoresis, fatigue, fever, unexpected weight change and weight loss.   HENT: Negative for congestion, dental problem, drooling, ear discharge, ear pain, facial swelling, hearing loss, mouth sores, nosebleeds, postnasal drip, rhinorrhea, sinus pressure, sneezing, sore throat, tinnitus, trouble swallowing and voice change.    Eyes: Negative for photophobia, pain, discharge, redness, itching and visual disturbance.   Respiratory: Negative for apnea, cough, choking, chest tightness, shortness of breath, wheezing and stridor.    Cardiovascular: Negative for chest pain, palpitations and leg swelling.   Gastrointestinal: Negative for abdominal distention, abdominal pain, anal bleeding, blood in stool, bowel incontinence, constipation, diarrhea, nausea, rectal pain and vomiting.   Endocrine: Negative for cold intolerance, heat intolerance, polydipsia, polyphagia and polyuria.   Genitourinary: Negative for bladder incontinence, decreased urine volume, difficulty urinating, dysuria, enuresis, flank pain, frequency, genital sores, hematuria, pelvic pain and urgency.   Musculoskeletal: Positive for arthralgias, back pain and myalgias. Negative for gait problem, joint swelling, neck pain and neck stiffness.   Skin: Negative for color change, pallor, rash and wound.   Allergic/Immunologic: Negative for environmental allergies, food allergies and immunocompromised state.   Neurological: Positive for headaches. Negative for dizziness, tingling, tremors,  "seizures, syncope, facial asymmetry, speech difficulty, weakness, light-headedness, numbness and paresthesias.   Hematological: Negative for adenopathy. Does not bruise/bleed easily.   Psychiatric/Behavioral: Negative for agitation, behavioral problems, confusion, decreased concentration, dysphoric mood, hallucinations, self-injury, sleep disturbance and suicidal ideas. The patient is not nervous/anxious and is not hyperactive.    All other systems reviewed and are negative.      Objective   Blood pressure 100/60, temperature 97.9 °F (36.6 °C), height 66\" (167.6 cm), weight 211 lb (95.7 kg).  Body mass index is 34.06 kg/(m^2).    Physical Exam   Constitutional: He is oriented to person, place, and time. He appears well-developed.  Non-toxic appearance.   HENT:   Head: Normocephalic and atraumatic.   Right Ear: Hearing normal.   Left Ear: Hearing normal.   Nose: Nose normal.   Eyes: Conjunctivae, EOM and lids are normal. Pupils are equal, round, and reactive to light.   Neck: Normal range of motion. No JVD present.   Cardiovascular: Normal rate and regular rhythm.    Pulses:       Radial pulses are 2+ on the right side, and 2+ on the left side.   Pulmonary/Chest: Effort normal. No stridor. No respiratory distress. He has no wheezes.   Musculoskeletal:        Thoracic back: He exhibits no tenderness, no swelling, no pain and no spasm.        Lumbar back: He exhibits decreased range of motion, tenderness and pain. He exhibits no bony tenderness, no swelling and no spasm.   Muscle Group    L          R  Hip Flexor          5          5  Knee Extensor  5          5  ADF                   5          5  APF                   5          5  EHL                   5          5   Neurological: He is alert and oriented to person, place, and time. He has normal strength. He displays normal reflexes. No cranial nerve deficit or sensory deficit. He exhibits normal muscle tone. He displays a negative Romberg sign. Coordination and " gait normal. GCS eye subscore is 4. GCS verbal subscore is 5. GCS motor subscore is 6.   Reflex Scores:       Tricep reflexes are 2+ on the right side and 2+ on the left side.       Bicep reflexes are 2+ on the right side and 2+ on the left side.       Brachioradialis reflexes are 2+ on the right side and 2+ on the left side.       Patellar reflexes are 2+ on the right side and 2+ on the left side.       Achilles reflexes are 1+ on the right side and 1+ on the left side.  Skin: Skin is warm and dry. No rash noted. No erythema.   Psychiatric: He has a normal mood and affect. His behavior is normal. Judgment and thought content normal.   Nursing note and vitals reviewed.        Assessment/Plan     Independent Review of Radiographic Studies:      Available for my review is a MRI of the lumbar spine performed on 10/23/17.  This discloses a grade 2 spondylolisthesis of L5 on S1.  The spondylolisthesis measures approximately 13 mm.  There are bilateral pars defects at L5-S1.  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  There is severe neural foraminal stenosis at L5-S1 bilaterally.    Medical Decision Making:      This is a 37-year-old man with a grade 2 isthmic spondylolisthesis.  He is a candidate for an L5-S1 fusion.  He is interested in surgery.  He has failed conservative treatment area did    Informed consent for an L5-S1 PLIF was obtained from the patient.  He acknowledges risk of nerve root injury, paralysis, weakness, loss of sensation, permanent neurologic deficit, bleeding, infection, spinal fluid leak, iatrogenic instability, failure of benefit of the operation, or need for additional procedures.  All questions were answered.  No guarantees are given or implied.  The patient elects proceed with surgery.    We will schedule him for an L5-S1 PLIF on an elective basis in the near future.    Alvin was seen today for back pain and leg pain.    Diagnoses and all orders for this  visit:    Pars defect of lumbar spine  -     CT Lumbar Spine Without Contrast; Future    Spondylolisthesis at L5-S1 level  -     CT Lumbar Spine Without Contrast; Future      No Follow-up on file.           This document signed by SHEN Jacinto MD November 20, 2017 4:36 PM           Electronically signed by Kevin Jacinto MD at 11/20/2017  4:37 PM      Kevin Jacinto MD at 11/20/2017  3:30 PM          Subjective     Chief Complaint: Low back pain    Patient ID: Alvin Gordon is a 37 y.o. male seen for consultation today at the request of  Sanjay New MD    Back Pain   This is a chronic problem. The current episode started more than 1 year ago. The problem occurs constantly. The problem has been gradually worsening since onset. The pain is present in the lumbar spine and sacro-iliac. The quality of the pain is described as aching. The pain radiates to the right foot and left foot. The pain is at a severity of 8/10. The pain is severe. The pain is the same all the time. The symptoms are aggravated by lying down, bending, position, standing and twisting. Stiffness is present all day. Associated symptoms include headaches and leg pain. Pertinent negatives include no abdominal pain, bladder incontinence, bowel incontinence, chest pain, dysuria, fever, numbness, paresis, paresthesias, pelvic pain, perianal numbness, tingling, weakness or weight loss. He has tried analgesics, chiropractic manipulation, heat and NSAIDs for the symptoms. The treatment provided no relief.       This is a 37-year-old man who presents to my clinic with a long-standing history of progressive low back pain.  He has tried home exercises and anti-inflammatories.  These have been ineffective in alleviating his back pain.  He is recently reporting some leg pain that has become progressive.  His pain is affecting his ability to work and sleep.  He reports that his quality of life is diminished as a result of his  refractory, chronic low back pain.    The following portions of the patient's history were reviewed and updated as appropriate: allergies, current medications, past family history, past medical history, past social history, past surgical history and problem list.    Family history:   Family History   Problem Relation Age of Onset   • No Known Problems Mother    • No Known Problems Father        Social history:   Social History     Social History   • Marital status:      Spouse name: N/A   • Number of children: N/A   • Years of education: N/A     Occupational History   • Not on file.     Social History Main Topics   • Smoking status: Never Smoker   • Smokeless tobacco: Never Used   • Alcohol use Yes      Comment: occasionally   • Drug use: No   • Sexual activity: Not on file     Other Topics Concern   • Not on file     Social History Narrative       Review of Systems   Constitutional: Negative for activity change, appetite change, chills, diaphoresis, fatigue, fever, unexpected weight change and weight loss.   HENT: Negative for congestion, dental problem, drooling, ear discharge, ear pain, facial swelling, hearing loss, mouth sores, nosebleeds, postnasal drip, rhinorrhea, sinus pressure, sneezing, sore throat, tinnitus, trouble swallowing and voice change.    Eyes: Negative for photophobia, pain, discharge, redness, itching and visual disturbance.   Respiratory: Negative for apnea, cough, choking, chest tightness, shortness of breath, wheezing and stridor.    Cardiovascular: Negative for chest pain, palpitations and leg swelling.   Gastrointestinal: Negative for abdominal distention, abdominal pain, anal bleeding, blood in stool, bowel incontinence, constipation, diarrhea, nausea, rectal pain and vomiting.   Endocrine: Negative for cold intolerance, heat intolerance, polydipsia, polyphagia and polyuria.   Genitourinary: Negative for bladder incontinence, decreased urine volume, difficulty urinating, dysuria,  "enuresis, flank pain, frequency, genital sores, hematuria, pelvic pain and urgency.   Musculoskeletal: Positive for arthralgias, back pain and myalgias. Negative for gait problem, joint swelling, neck pain and neck stiffness.   Skin: Negative for color change, pallor, rash and wound.   Allergic/Immunologic: Negative for environmental allergies, food allergies and immunocompromised state.   Neurological: Positive for headaches. Negative for dizziness, tingling, tremors, seizures, syncope, facial asymmetry, speech difficulty, weakness, light-headedness, numbness and paresthesias.   Hematological: Negative for adenopathy. Does not bruise/bleed easily.   Psychiatric/Behavioral: Negative for agitation, behavioral problems, confusion, decreased concentration, dysphoric mood, hallucinations, self-injury, sleep disturbance and suicidal ideas. The patient is not nervous/anxious and is not hyperactive.    All other systems reviewed and are negative.      Objective   Blood pressure 100/60, temperature 97.9 °F (36.6 °C), height 66\" (167.6 cm), weight 211 lb (95.7 kg).  Body mass index is 34.06 kg/(m^2).    Physical Exam   Constitutional: He is oriented to person, place, and time. He appears well-developed.  Non-toxic appearance.   HENT:   Head: Normocephalic and atraumatic.   Right Ear: Hearing normal.   Left Ear: Hearing normal.   Nose: Nose normal.   Eyes: Conjunctivae, EOM and lids are normal. Pupils are equal, round, and reactive to light.   Neck: Normal range of motion. No JVD present.   Cardiovascular: Normal rate and regular rhythm.    Pulses:       Radial pulses are 2+ on the right side, and 2+ on the left side.   Pulmonary/Chest: Effort normal. No stridor. No respiratory distress. He has no wheezes.   Musculoskeletal:        Thoracic back: He exhibits no tenderness, no swelling, no pain and no spasm.        Lumbar back: He exhibits decreased range of motion, tenderness and pain. He exhibits no bony tenderness, no " swelling and no spasm.   Muscle Group    L          R  Hip Flexor          5          5  Knee Extensor  5          5  ADF                   5          5  APF                   5          5  EHL                   5          5   Neurological: He is alert and oriented to person, place, and time. He has normal strength. He displays normal reflexes. No cranial nerve deficit or sensory deficit. He exhibits normal muscle tone. He displays a negative Romberg sign. Coordination and gait normal. GCS eye subscore is 4. GCS verbal subscore is 5. GCS motor subscore is 6.   Reflex Scores:       Tricep reflexes are 2+ on the right side and 2+ on the left side.       Bicep reflexes are 2+ on the right side and 2+ on the left side.       Brachioradialis reflexes are 2+ on the right side and 2+ on the left side.       Patellar reflexes are 2+ on the right side and 2+ on the left side.       Achilles reflexes are 1+ on the right side and 1+ on the left side.  Skin: Skin is warm and dry. No rash noted. No erythema.   Psychiatric: He has a normal mood and affect. His behavior is normal. Judgment and thought content normal.   Nursing note and vitals reviewed.        Assessment/Plan     Independent Review of Radiographic Studies:      Available for my review is a MRI of the lumbar spine performed on 10/23/17.  This discloses a grade 2 spondylolisthesis of L5 on S1.  The spondylolisthesis measures approximately 13 mm.  There are bilateral pars defects at L5-S1.  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  There is severe neural foraminal stenosis at L5-S1 bilaterally.    Medical Decision Making:      This is a 37-year-old man with a grade 2 isthmic spondylolisthesis.  He is a candidate for an L5-S1 fusion.  He is interested in surgery.  He has failed conservative treatment area did    Informed consent for an L5-S1 PLIF was obtained from the patient.  He acknowledges risk of nerve root injury, paralysis,  "weakness, loss of sensation, permanent neurologic deficit, bleeding, infection, spinal fluid leak, iatrogenic instability, failure of benefit of the operation, or need for additional procedures.  All questions were answered.  No guarantees are given or implied.  The patient elects proceed with surgery.    We will schedule him for an L5-S1 PLIF on an elective basis in the near future.    Alvin was seen today for back pain and leg pain.    Diagnoses and all orders for this visit:    Pars defect of lumbar spine  -     CT Lumbar Spine Without Contrast; Future    Spondylolisthesis at L5-S1 level  -     CT Lumbar Spine Without Contrast; Future      No Follow-up on file.           This document signed by SHEN Jacinto MD November 20, 2017 4:36 PM         Electronically signed by Kevin Jacinto MD at 11/20/2017  4:40 PM      CYNDI Silva at 12/12/2017  7:02 AM          [facility] Pre-op    Full history and physical note from office reviewed and updated.  See office note attached.    /91 (BP Location: Right arm, Patient Position: Lying)  Pulse 76  Temp 98.3 °F (36.8 °C) (Temporal Artery )   Resp 16  Ht 167.6 cm (66\")  Wt 93 kg (205 lb)  SpO2 97%  BMI 33.09 kg/m2     Physical Exam:     Cardiovascular - regular rate and rhythm. Normal s1/s2. No heaves, rubs, gallops or murmurs. No peripheral edema     Respiratory - lungs clear to ascultation bilaterally. Unlabored respirations. No wheezes, crackles or rales    IMM:  Influenza:  denies  Pneumococcal:  denies  Tetanus:  denies    Cancer Staging (if applicable)  Cancer Patient: __ yes _x_no __unknown__N/A; If yes, clinical stage T:__ N:__M:__, stage group or __N/A    CYNDI Edwards 12/12/2017 7:02 AM       Electronically signed by Kevin Jacinto MD at 12/12/2017  7:30 AM    Source Note             Subjective     Chief Complaint: Low back pain    Patient ID: Alvin Gordon is a 37 y.o. male seen for consultation today " at the request of  Sanjay New MD    Back Pain   This is a chronic problem. The current episode started more than 1 year ago. The problem occurs constantly. The problem has been gradually worsening since onset. The pain is present in the lumbar spine and sacro-iliac. The quality of the pain is described as aching. The pain radiates to the right foot and left foot. The pain is at a severity of 8/10. The pain is severe. The pain is the same all the time. The symptoms are aggravated by lying down, bending, position, standing and twisting. Stiffness is present all day. Associated symptoms include headaches and leg pain. Pertinent negatives include no abdominal pain, bladder incontinence, bowel incontinence, chest pain, dysuria, fever, numbness, paresis, paresthesias, pelvic pain, perianal numbness, tingling, weakness or weight loss. He has tried analgesics, chiropractic manipulation, heat and NSAIDs for the symptoms. The treatment provided no relief.       This is a 37-year-old man who presents to my clinic with a long-standing history of progressive low back pain.  He has tried home exercises and anti-inflammatories.  These have been ineffective in alleviating his back pain.  He is recently reporting some leg pain that has become progressive.  His pain is affecting his ability to work and sleep.  He reports that his quality of life is diminished as a result of his refractory, chronic low back pain.    The following portions of the patient's history were reviewed and updated as appropriate: allergies, current medications, past family history, past medical history, past social history, past surgical history and problem list.    Family history:   Family History   Problem Relation Age of Onset   • No Known Problems Mother    • No Known Problems Father        Social history:   Social History     Social History   • Marital status:      Spouse name: N/A   • Number of children: N/A   • Years of education: N/A      Occupational History   • Not on file.     Social History Main Topics   • Smoking status: Never Smoker   • Smokeless tobacco: Never Used   • Alcohol use Yes      Comment: occasionally   • Drug use: No   • Sexual activity: Not on file     Other Topics Concern   • Not on file     Social History Narrative       Review of Systems   Constitutional: Negative for activity change, appetite change, chills, diaphoresis, fatigue, fever, unexpected weight change and weight loss.   HENT: Negative for congestion, dental problem, drooling, ear discharge, ear pain, facial swelling, hearing loss, mouth sores, nosebleeds, postnasal drip, rhinorrhea, sinus pressure, sneezing, sore throat, tinnitus, trouble swallowing and voice change.    Eyes: Negative for photophobia, pain, discharge, redness, itching and visual disturbance.   Respiratory: Negative for apnea, cough, choking, chest tightness, shortness of breath, wheezing and stridor.    Cardiovascular: Negative for chest pain, palpitations and leg swelling.   Gastrointestinal: Negative for abdominal distention, abdominal pain, anal bleeding, blood in stool, bowel incontinence, constipation, diarrhea, nausea, rectal pain and vomiting.   Endocrine: Negative for cold intolerance, heat intolerance, polydipsia, polyphagia and polyuria.   Genitourinary: Negative for bladder incontinence, decreased urine volume, difficulty urinating, dysuria, enuresis, flank pain, frequency, genital sores, hematuria, pelvic pain and urgency.   Musculoskeletal: Positive for arthralgias, back pain and myalgias. Negative for gait problem, joint swelling, neck pain and neck stiffness.   Skin: Negative for color change, pallor, rash and wound.   Allergic/Immunologic: Negative for environmental allergies, food allergies and immunocompromised state.   Neurological: Positive for headaches. Negative for dizziness, tingling, tremors, seizures, syncope, facial asymmetry, speech difficulty, weakness,  "light-headedness, numbness and paresthesias.   Hematological: Negative for adenopathy. Does not bruise/bleed easily.   Psychiatric/Behavioral: Negative for agitation, behavioral problems, confusion, decreased concentration, dysphoric mood, hallucinations, self-injury, sleep disturbance and suicidal ideas. The patient is not nervous/anxious and is not hyperactive.    All other systems reviewed and are negative.      Objective   Blood pressure 100/60, temperature 97.9 °F (36.6 °C), height 66\" (167.6 cm), weight 211 lb (95.7 kg).  Body mass index is 34.06 kg/(m^2).    Physical Exam   Constitutional: He is oriented to person, place, and time. He appears well-developed.  Non-toxic appearance.   HENT:   Head: Normocephalic and atraumatic.   Right Ear: Hearing normal.   Left Ear: Hearing normal.   Nose: Nose normal.   Eyes: Conjunctivae, EOM and lids are normal. Pupils are equal, round, and reactive to light.   Neck: Normal range of motion. No JVD present.   Cardiovascular: Normal rate and regular rhythm.    Pulses:       Radial pulses are 2+ on the right side, and 2+ on the left side.   Pulmonary/Chest: Effort normal. No stridor. No respiratory distress. He has no wheezes.   Musculoskeletal:        Thoracic back: He exhibits no tenderness, no swelling, no pain and no spasm.        Lumbar back: He exhibits decreased range of motion, tenderness and pain. He exhibits no bony tenderness, no swelling and no spasm.   Muscle Group    L          R  Hip Flexor          5          5  Knee Extensor  5          5  ADF                   5          5  APF                   5          5  EHL                   5          5   Neurological: He is alert and oriented to person, place, and time. He has normal strength. He displays normal reflexes. No cranial nerve deficit or sensory deficit. He exhibits normal muscle tone. He displays a negative Romberg sign. Coordination and gait normal. GCS eye subscore is 4. GCS verbal subscore is 5. GCS " motor subscore is 6.   Reflex Scores:       Tricep reflexes are 2+ on the right side and 2+ on the left side.       Bicep reflexes are 2+ on the right side and 2+ on the left side.       Brachioradialis reflexes are 2+ on the right side and 2+ on the left side.       Patellar reflexes are 2+ on the right side and 2+ on the left side.       Achilles reflexes are 1+ on the right side and 1+ on the left side.  Skin: Skin is warm and dry. No rash noted. No erythema.   Psychiatric: He has a normal mood and affect. His behavior is normal. Judgment and thought content normal.   Nursing note and vitals reviewed.        Assessment/Plan     Independent Review of Radiographic Studies:      Available for my review is a MRI of the lumbar spine performed on 10/23/17.  This discloses a grade 2 spondylolisthesis of L5 on S1.  The spondylolisthesis measures approximately 13 mm.  There are bilateral pars defects at L5-S1.  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  There is severe neural foraminal stenosis at L5-S1 bilaterally.    Medical Decision Making:      This is a 37-year-old man with a grade 2 isthmic spondylolisthesis.  He is a candidate for an L5-S1 fusion.  He is interested in surgery.  He has failed conservative treatment area did    Informed consent for an L5-S1 PLIF was obtained from the patient.  He acknowledges risk of nerve root injury, paralysis, weakness, loss of sensation, permanent neurologic deficit, bleeding, infection, spinal fluid leak, iatrogenic instability, failure of benefit of the operation, or need for additional procedures.  All questions were answered.  No guarantees are given or implied.  The patient elects proceed with surgery.    We will schedule him for an L5-S1 PLIF on an elective basis in the near future.    Alvin was seen today for back pain and leg pain.    Diagnoses and all orders for this visit:    Pars defect of lumbar spine  -     CT Lumbar Spine Without  Contrast; Future    Spondylolisthesis at L5-S1 level  -     CT Lumbar Spine Without Contrast; Future      No Follow-up on file.           This document signed by SHEN Jacinto MD November 20, 2017 4:36 PM            Electronically signed by Kevin Jacinto MD at 11/20/2017  4:37 PM            Kevin Jacinto MD at 12/12/2017  7:29 AM            H&P reviewed. The patient was examined and there are no changes to the H&P.         Electronically signed by Kevin Jacinto MD at 12/12/2017  7:29 AM    Source Note             Subjective     Chief Complaint: Low back pain    Patient ID: Alvin Gordon is a 37 y.o. male seen for consultation today at the request of  Sanjay New MD    Back Pain   This is a chronic problem. The current episode started more than 1 year ago. The problem occurs constantly. The problem has been gradually worsening since onset. The pain is present in the lumbar spine and sacro-iliac. The quality of the pain is described as aching. The pain radiates to the right foot and left foot. The pain is at a severity of 8/10. The pain is severe. The pain is the same all the time. The symptoms are aggravated by lying down, bending, position, standing and twisting. Stiffness is present all day. Associated symptoms include headaches and leg pain. Pertinent negatives include no abdominal pain, bladder incontinence, bowel incontinence, chest pain, dysuria, fever, numbness, paresis, paresthesias, pelvic pain, perianal numbness, tingling, weakness or weight loss. He has tried analgesics, chiropractic manipulation, heat and NSAIDs for the symptoms. The treatment provided no relief.       This is a 37-year-old man who presents to my clinic with a long-standing history of progressive low back pain.  He has tried home exercises and anti-inflammatories.  These have been ineffective in alleviating his back pain.  He is recently reporting some leg pain that has become progressive.  His  pain is affecting his ability to work and sleep.  He reports that his quality of life is diminished as a result of his refractory, chronic low back pain.    The following portions of the patient's history were reviewed and updated as appropriate: allergies, current medications, past family history, past medical history, past social history, past surgical history and problem list.    Family history:   Family History   Problem Relation Age of Onset   • No Known Problems Mother    • No Known Problems Father        Social history:   Social History     Social History   • Marital status:      Spouse name: N/A   • Number of children: N/A   • Years of education: N/A     Occupational History   • Not on file.     Social History Main Topics   • Smoking status: Never Smoker   • Smokeless tobacco: Never Used   • Alcohol use Yes      Comment: occasionally   • Drug use: No   • Sexual activity: Not on file     Other Topics Concern   • Not on file     Social History Narrative       Review of Systems   Constitutional: Negative for activity change, appetite change, chills, diaphoresis, fatigue, fever, unexpected weight change and weight loss.   HENT: Negative for congestion, dental problem, drooling, ear discharge, ear pain, facial swelling, hearing loss, mouth sores, nosebleeds, postnasal drip, rhinorrhea, sinus pressure, sneezing, sore throat, tinnitus, trouble swallowing and voice change.    Eyes: Negative for photophobia, pain, discharge, redness, itching and visual disturbance.   Respiratory: Negative for apnea, cough, choking, chest tightness, shortness of breath, wheezing and stridor.    Cardiovascular: Negative for chest pain, palpitations and leg swelling.   Gastrointestinal: Negative for abdominal distention, abdominal pain, anal bleeding, blood in stool, bowel incontinence, constipation, diarrhea, nausea, rectal pain and vomiting.   Endocrine: Negative for cold intolerance, heat intolerance, polydipsia, polyphagia  "and polyuria.   Genitourinary: Negative for bladder incontinence, decreased urine volume, difficulty urinating, dysuria, enuresis, flank pain, frequency, genital sores, hematuria, pelvic pain and urgency.   Musculoskeletal: Positive for arthralgias, back pain and myalgias. Negative for gait problem, joint swelling, neck pain and neck stiffness.   Skin: Negative for color change, pallor, rash and wound.   Allergic/Immunologic: Negative for environmental allergies, food allergies and immunocompromised state.   Neurological: Positive for headaches. Negative for dizziness, tingling, tremors, seizures, syncope, facial asymmetry, speech difficulty, weakness, light-headedness, numbness and paresthesias.   Hematological: Negative for adenopathy. Does not bruise/bleed easily.   Psychiatric/Behavioral: Negative for agitation, behavioral problems, confusion, decreased concentration, dysphoric mood, hallucinations, self-injury, sleep disturbance and suicidal ideas. The patient is not nervous/anxious and is not hyperactive.    All other systems reviewed and are negative.      Objective   Blood pressure 100/60, temperature 97.9 °F (36.6 °C), height 66\" (167.6 cm), weight 211 lb (95.7 kg).  Body mass index is 34.06 kg/(m^2).    Physical Exam   Constitutional: He is oriented to person, place, and time. He appears well-developed.  Non-toxic appearance.   HENT:   Head: Normocephalic and atraumatic.   Right Ear: Hearing normal.   Left Ear: Hearing normal.   Nose: Nose normal.   Eyes: Conjunctivae, EOM and lids are normal. Pupils are equal, round, and reactive to light.   Neck: Normal range of motion. No JVD present.   Cardiovascular: Normal rate and regular rhythm.    Pulses:       Radial pulses are 2+ on the right side, and 2+ on the left side.   Pulmonary/Chest: Effort normal. No stridor. No respiratory distress. He has no wheezes.   Musculoskeletal:        Thoracic back: He exhibits no tenderness, no swelling, no pain and no spasm. "        Lumbar back: He exhibits decreased range of motion, tenderness and pain. He exhibits no bony tenderness, no swelling and no spasm.   Muscle Group    L          R  Hip Flexor          5          5  Knee Extensor  5          5  ADF                   5          5  APF                   5          5  EHL                   5          5   Neurological: He is alert and oriented to person, place, and time. He has normal strength. He displays normal reflexes. No cranial nerve deficit or sensory deficit. He exhibits normal muscle tone. He displays a negative Romberg sign. Coordination and gait normal. GCS eye subscore is 4. GCS verbal subscore is 5. GCS motor subscore is 6.   Reflex Scores:       Tricep reflexes are 2+ on the right side and 2+ on the left side.       Bicep reflexes are 2+ on the right side and 2+ on the left side.       Brachioradialis reflexes are 2+ on the right side and 2+ on the left side.       Patellar reflexes are 2+ on the right side and 2+ on the left side.       Achilles reflexes are 1+ on the right side and 1+ on the left side.  Skin: Skin is warm and dry. No rash noted. No erythema.   Psychiatric: He has a normal mood and affect. His behavior is normal. Judgment and thought content normal.   Nursing note and vitals reviewed.        Assessment/Plan     Independent Review of Radiographic Studies:      Available for my review is a MRI of the lumbar spine performed on 10/23/17.  This discloses a grade 2 spondylolisthesis of L5 on S1.  The spondylolisthesis measures approximately 13 mm.  There are bilateral pars defects at L5-S1.  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  There is severe neural foraminal stenosis at L5-S1 bilaterally.    Medical Decision Making:      This is a 37-year-old man with a grade 2 isthmic spondylolisthesis.  He is a candidate for an L5-S1 fusion.  He is interested in surgery.  He has failed conservative treatment area did    Informed  consent for an L5-S1 PLIF was obtained from the patient.  He acknowledges risk of nerve root injury, paralysis, weakness, loss of sensation, permanent neurologic deficit, bleeding, infection, spinal fluid leak, iatrogenic instability, failure of benefit of the operation, or need for additional procedures.  All questions were answered.  No guarantees are given or implied.  The patient elects proceed with surgery.    We will schedule him for an L5-S1 PLIF on an elective basis in the near future.    Alvin was seen today for back pain and leg pain.    Diagnoses and all orders for this visit:    Pars defect of lumbar spine  -     CT Lumbar Spine Without Contrast; Future    Spondylolisthesis at L5-S1 level  -     CT Lumbar Spine Without Contrast; Future      No Follow-up on file.           This document signed by SHEN Jacinto MD November 20, 2017 4:36 PM          Electronically signed by Kevin Jacinto MD at 11/20/2017  4:40 PM              Vital Signs (last 24 hours)       12/12 0700  -  12/13 0659 12/13 0700  -  12/13 0915   Most Recent    Temp (°F) 97.6 -  98.3      97.4     97.4 (36.3)    Heart Rate 76 -  (!)128      94     94    Resp 16 -  18       18    /87 -  130/77      117/64     117/64    SpO2 (%) 90 -  97      99     99          Intake & Output (last day)       12/12 0701 - 12/13 0700 12/13 0701 - 12/14 0700    P.O. 320     I.V. (mL/kg) 1000 (10.8)     Total Intake(mL/kg) 1320 (14.2)     Urine (mL/kg/hr) 1400 (0.6) 1400 (6.7)    Other 150 (0.1) 40 (0.2)    Blood 200 (0.1)     Total Output 1750 1440    Net -430 -1440              Lines, Drains & Airways    Active LDAs     Name:   Placement date:   Placement time:   Site:   Days:    Peripheral IV Line - Single Lumen 12/12/17 0742 metacarpal vein (top of hand), right 18 gauge;1 in length  12/12/17    0742      1    Drain/Device Site 12/12/17 1129 midline lumbar spine collapsible closed device  12/12/17    1129      less than 1    Urethral  "Catheter 12/12/17 0750 100% silicone 16  12/12/17    0750      1                Hospital Medications (all)       Dose Frequency Start End    acetaminophen (TYLENOL) tablet 1,000 mg 1,000 mg Once 12/12/2017 12/12/2017    Sig - Route: Take 2 tablets by mouth 1 (One) Time. - Oral    Linked Group 1:  \"And\" Linked Group Details        bisacodyl (DULCOLAX) suppository 10 mg 10 mg Daily PRN 12/12/2017     Sig - Route: Insert 1 suppository into the rectum Daily As Needed for Constipation. - Rectal    ceFAZolin in dextrose (ANCEF) IVPB solution 2 g 2 g Once 12/12/2017 12/12/2017    Sig - Route: Infuse 100 mL into a venous catheter 1 (One) Time. - Intravenous    ceFAZolin in dextrose (ANCEF) IVPB solution 2 g 2 g Every 8 Hours 12/12/2017 12/13/2017    Sig - Route: Infuse 100 mL into a venous catheter Every 8 (Eight) Hours. - Intravenous    dexamethasone (DECADRON) 10 mg / 50 mL NS syringe 10 mg Once 12/12/2017 12/12/2017    Sig - Route: Infuse 10 mg into a venous catheter 1 (One) Time. - Intravenous    diazePAM (VALIUM) injection 5 mg 5 mg Every 4 Hours PRN 12/12/2017 12/22/2017    Sig - Route: Infuse 1 mL into a venous catheter Every 4 (Four) Hours As Needed for Muscle Spasms. - Intravenous    docusate sodium (COLACE) capsule 100 mg 100 mg 2 Times Daily PRN 12/12/2017     Sig - Route: Take 1 capsule by mouth 2 (Two) Times a Day As Needed for Constipation. - Oral    enoxaparin (LOVENOX) syringe 40 mg 40 mg Daily 12/14/2017     Sig - Route: Inject 0.4 mL under the skin Daily. - Subcutaneous    famotidine (PEPCID) tablet 20 mg 20 mg Once 12/12/2017 12/12/2017    Sig - Route: Take 1 tablet by mouth 1 (One) Time. - Oral    HYDROmorphone (DILAUDID) injection 0.5 mg 0.5 mg Every 5 Minutes PRN 12/12/2017 12/12/2017    Sig - Route: Infuse 0.5 mL into a venous catheter Every 5 (Five) Minutes As Needed for Severe Pain . - Intravenous    HYDROmorphone (DILAUDID) injection 0.5 mg 0.5 mg Every 2 Hours PRN 12/12/2017 12/22/2017    Sig - " "Route: Infuse 0.5 mL into a venous catheter Every 2 (Two) Hours As Needed for Severe Pain . - Intravenous    Linked Group 2:  \"And\" Linked Group Details        ibuprofen (ADVIL,MOTRIN) tablet 800 mg 800 mg Once 12/12/2017 12/12/2017    Sig - Route: Take 1 tablet by mouth 1 (One) Time. - Oral    Linked Group 1:  \"And\" Linked Group Details        lactated ringers infusion 9 mL/hr Continuous 12/12/2017     Sig - Route: Infuse 9 mL/hr into a venous catheter Continuous. - Intravenous    lidocaine (XYLOCAINE) 1 % injection 2 mL 2 mL Once 12/12/2017 12/12/2017    Sig - Route: 2 mL by Infiltration route 1 (One) Time. - Infiltration    naloxone (NARCAN) injection 0.4 mg 0.4 mg Every 5 Minutes PRN 12/12/2017     Sig - Route: Infuse 1 mL into a venous catheter Every 5 (Five) Minutes As Needed for Respiratory Depression. - Intravenous    Linked Group 2:  \"And\" Linked Group Details        ondansetron (ZOFRAN) injection 4 mg 4 mg Every 6 Hours PRN 12/12/2017     Sig - Route: Infuse 2 mL into a venous catheter Every 6 (Six) Hours As Needed for Nausea or Vomiting. - Intravenous    Linked Group 3:  \"Or\" Linked Group Details        ondansetron (ZOFRAN) tablet 4 mg 4 mg Every 6 Hours PRN 12/12/2017     Sig - Route: Take 1 tablet by mouth Every 6 (Six) Hours As Needed for Nausea or Vomiting. - Oral    Linked Group 3:  \"Or\" Linked Group Details        oxyCODONE (oxyCONTIN) 12 hr tablet 10 mg 10 mg Once 12/12/2017 12/12/2017    Sig - Route: Take 1 tablet by mouth 1 (One) Time. - Oral    Linked Group 1:  \"And\" Linked Group Details        oxyCODONE (oxyCONTIN) 12 hr tablet 10 mg 10 mg Every 8 Hours PRN 12/12/2017 12/22/2017    Sig - Route: Take 1 tablet by mouth Every 8 (Eight) Hours As Needed for Moderate Pain . - Oral    oxyCODONE-acetaminophen (PERCOCET) 5-325 MG per tablet 1 tablet 1 tablet Every 4 Hours PRN 12/12/2017 12/22/2017    Sig - Route: Take 1 tablet by mouth Every 4 (Four) Hours As Needed for Severe Pain . - Oral    " polyethylene glycol 3350 powder (packet) 17 g Daily PRN 12/12/2017     Sig - Route: Take 17 g by mouth Daily As Needed for Constipation. - Oral    pregabalin (LYRICA) capsule 150 mg 150 mg Once 12/12/2017 12/12/2017    Sig - Route: Take 2 capsules by mouth 1 (One) Time. - Oral    pregabalin (LYRICA) capsule 75 mg 75 mg Every 12 Hours Scheduled 12/12/2017     Sig - Route: Take 1 capsule by mouth Every 12 (Twelve) Hours. - Oral    sennosides-docusate sodium (SENOKOT-S) 8.6-50 MG tablet 1 tablet 1 tablet Nightly PRN 12/12/2017     Sig - Route: Take 1 tablet by mouth At Night As Needed for Constipation. - Oral    sodium chloride 0.9 % flush 1-10 mL 1-10 mL As Needed 12/12/2017     Sig - Route: Infuse 1-10 mL into a venous catheter As Needed for Line Care. - Intravenous    dexamethasone (DECADRON) 8 mg in sodium chloride 0.9 % IVPB (Discontinued) 8 mg Once As Needed 12/12/2017 12/12/2017    Sig - Route: Infuse 8 mg into a venous catheter 1 (One) Time As Needed for Nausea or Vomiting (nausea/vomiting). - Intravenous    Reason for Discontinue: Patient Transfer    fentaNYL citrate (PF) (SUBLIMAZE) injection 50 mcg (Discontinued) 50 mcg Every 5 Minutes PRN 12/12/2017 12/12/2017    Sig - Route: Infuse 1 mL into a venous catheter Every 5 (Five) Minutes As Needed for Moderate Pain . - Intravenous    Reason for Discontinue: Patient Transfer    fibrin sealant (TISSEEL [facility]) solution (Discontinued)  As Needed 12/12/2017 12/12/2017    Sig: As Needed.    Reason for Discontinue: Patient Discharge    floseal (FLOSEAL) injection (Discontinued)  As Needed 12/12/2017 12/12/2017    Sig: As Needed.    Reason for Discontinue: Patient Discharge    gelatin absorbable (GELFOAM) sponge (Discontinued)  As Needed 12/12/2017 12/12/2017    Sig: As Needed.    Reason for Discontinue: Patient Discharge    lactated ringers bolus 500 mL (Discontinued) 500 mL Once As Needed 12/12/2017 12/12/2017    Sig - Route: Infuse 500 mL into a venous catheter 1  (One) Time As Needed (for hypovolemia, call anesthesiologist before initiating). - Intravenous    Reason for Discontinue: Patient Transfer    lidocaine-EPINEPHrine (XYLOCAINE W/EPI) 0.5 %-1:397851 injection (Discontinued)  As Needed 12/12/2017 12/12/2017    Sig: As Needed.    Reason for Discontinue: Patient Discharge    microfibrillar collagen (AVITENE) powder (Discontinued)  As Needed 12/12/2017 12/12/2017    Sig: As Needed.    Reason for Discontinue: Patient Discharge    ondansetron (ZOFRAN) injection 4 mg (Discontinued) 4 mg Once As Needed 12/12/2017 12/12/2017    Sig - Route: Infuse 2 mL into a venous catheter 1 (One) Time As Needed for Nausea or Vomiting. - Intravenous    Reason for Discontinue: Patient Transfer    sodium chloride 0.9 % flush 1-10 mL (Discontinued) 1-10 mL As Needed 12/12/2017 12/12/2017    Sig - Route: Infuse 1-10 mL into a venous catheter As Needed for Line Care. - Intravenous    sodium chloride 1,000 mL with bacitracin 50,000 Units irrigation (Discontinued)  As Needed 12/12/2017 12/12/2017    Sig: As Needed.    Reason for Discontinue: Patient Discharge    sterile water irrigation solution (Discontinued)  As Needed 12/12/2017 12/12/2017    Sig: As Needed.    Reason for Discontinue: Patient Discharge    thrombin spray (Discontinued)  As Needed 12/12/2017 12/12/2017    Sig: As Needed.    Reason for Discontinue: Patient Discharge          Lab Results (all)     None        Imaging Results (all)     Procedure Component Value Units Date/Time    FL > 1 Hour [[id number]] Resulted:  12/12/17 1207     Updated:  12/12/17 1207    Narrative:       This procedure was auto-finalized with no dictation required.    FL > 1 Hour [[id number]] Resulted:  12/12/17 1208     Updated:  12/12/17 1208    Narrative:       This procedure was auto-finalized with no dictation required.    FL > 1 Hour [[id number]] Resulted:  12/12/17 1208     Updated:  12/12/17 1208    Narrative:       This procedure was auto-finalized with no  dictation required.    FL O Arm During Surgery [[id number]] Collected:  12/12/17 1351     Updated:  12/12/17 1614    Narrative:       EXAMINATION: FL O ARM DURING SURGERY- 12/12/2017     INDICATION: PLIF; M43.06-Spondylolysis, lumbar region      TECHNIQUE: Intraoperative fluoroscopy utilized for improved localization  and treatment planning     COMPARISON: NONE     FINDINGS: Intraoperative fluoroscopy with total fluoroscopic time usage  2 seconds and 0 images saved during PLIF L5-S1.       Impression:       Intraoperative fluoroscopy utilized during posterior lumbar  interbody fusion L5-S1.     D:  12/12/2017  E:  12/12/2017         This report was finalized on 12/12/2017 4:12 PM by Dr. Neftaly Pratt.       FL O Arm During Surgery [[id number]] Collected:  12/12/17 1542     Updated:  12/12/17 1614    Narrative:       EXAMINATION: FL O ARM DURING SURGERY-     INDICATION: PLIF; M43.06-Spondylolysis, lumbar region      TECHNIQUE: Intraoperative fluoroscopy utilized for improved localization  and treatment planning     COMPARISON: NONE     FINDINGS: Intraoperative fluoroscopy with total fluoroscopic time usage  2 seconds and no representative images saved during L5-S1 posterior  lumbar interbody fusion.       Impression:       Intraoperative fluoroscopy utilized during posterior lumbar  interbody fusion L5-S1.     D:  12/12/2017  E:  12/12/2017         This report was finalized on 12/12/2017 4:12 PM by Dr. Neftaly Pratt.       FL C Arm During Surgery [[id number]] Collected:  12/12/17 1543     Updated:  12/12/17 1614    Narrative:       EXAMINATION: FLUOROSCOPY C-ARM DURING SURGERY-12/12/2017:     INDICATION: PLIF; M43.06-Spondylolysis, lumbar region.      TECHNIQUE: Intraoperative fluoroscopy utilized for improved localization  and treatment planning.     COMPARISON: NONE.     FINDINGS: Intraoperative fluoroscopy with total fluoroscopic time usage  2 seconds and 2 representative images saved of localization  and  intraoperative probe at the L5-S1 level.       Impression:       Intraoperative fluoroscopy utilized for PLIF L5-S1 planning.     D:  12/12/2017  E:  12/12/2017            This report was finalized on 12/12/2017 4:12 PM by Dr. Neftaly Pratt.                Operative/Procedure Notes (all)      Kevin Jacinto MD at 12/12/2017 11:48 AM  Version 1 of 1         LUMBAR DISCECTOMY POSTERIOR WITH FUSION INSTRUMENTATION  Progress Note    Alvin Gordon  12/12/2017    Pre-op Diagnosis:   Pars defect of lumbar spine [M43.06]       Post-Op Diagnosis Codes:     * Pars defect of lumbar spine [M43.06]    Procedure/CPT® Codes:  RI ARTHRODESIS POSTERIOR/POSTEROLATERAL LUMBAR [70201]    Procedure(s):  L5-S1 LUMBAR LAMINECTOMY POSTERIOR LUMBAR INTERBODY FUSION    Surgeon(s):  Kevin Jacinto MD    Anesthesia: General    Staff:   Circulator: Elinor Crawford RN  Radiology Technologist: Suhail Kovacs, RT  Scrub Person: Rai Butcher; Aneta Bryant  Vendor Representative: Scott Morales; Partha Thompson; Waldo Sinha  Nursing Assistant: Lona Drew  Assistant: Kyra Nogueira PA-C  [place]: Angela Mason RN; Susie Batista    Estimated Blood Loss: 200 mL    Urine Voided: 300 mL    Specimens:                None      Drains:   Drain/Device Site 12/12/17 1129 midline lumbar spine collapsible closed device (Active)       Urethral Catheter 12/12/17 0750 100% silicone 16 (Active)           Findings: Small iatrogenic durotomy was successfully closed with a suture and muscle graft.  Postprocedure CT scan demonstrated satisfactory position of the implanted screws.  It was not possible to place an interbody device due to the configuration of the L5 and S1 disc space    Complications: None      Kevin Jacinto MD     Date: 12/12/2017  Time: 11:48 AM         Electronically signed by Kevin Jacinto MD at 12/12/2017 11:49 AM      Kevin Jacinto MD at 12/12/2017 11:49 AM  Version 1  of 1         Preoperative diagnosis: Grade 1 spondylolisthesis, bilateral pars defect, lumbosacral radiculopathy  Postoperative diagnosis: Same    Indication for procedure: This is a 37-year-old man who presented to my clinic with chief complaints of severe, refractory low back pain and bilateral L5 radiculopathy.  He was evaluated with an MRI of the lumbar spine which demonstrated a grade 1 spondylolisthesis.  The CT scan was then performed which confirmed the presence of bilateral pars defects with severe bilateral neural foraminal stenosis.  Operative intervention is indicated.    Informed consent for this procedure was obtained from the patient.  He acknowledges risk of nerve root injury, paralysis, weakness, loss of sensation, permanent neurologic disability, bleeding, infection, spinal fluid leak, iatrogenic instability, instrumentation failure, failure of benefit of the operation, or need for additional procedures.  All questions were answered prior to beginning the procedure.  No guarantees were given or implied.  The patient elects to proceed with surgery.    Procedure in detail: The patient was identified in the preoperative holding area and brought to the operating suite where he underwent the uneventful induction of general endotracheal anesthesia. Venodyne's and a Berger catheter were then placed by the nursing staff.  The patient was gently rotated to the prone position on the OSI table.  The patient's lumbar spine was then shaved, prepped and draped in usual sterile fashion.  A timeout was performed.  Intravenous antibiotics were administered.  A midline, vertically oriented skin incision over the L5-S1 interspace was then made after anesthetizing the skin.  The stasis was achieved using bipolar and monopolar electrocautery.  Self-retaining retractors were placed and sequentially advanced.  The inferior aspect of the L4 spinous process was then exposed.  The lamina and spinous process at L5 and S1 were  exposed out to the facet joints bilaterally.  The reference array was affixed to the inferior aspect of the L4 spinous process.  A 3-dimensional rotational spin was then obtained using the alarm.  This was navigated with the navigation system.  Entry points into the pedicles at L5 and S1 were identified and marked without holes bilaterally.  Under stereotactic navigation, 6.5 mm taps were used at L5 and 5.5 mm Used at S1.  A Total of 46.5 x 45 Mm Medtronic Solera Screws Were Then Placed after Confirming with the Ball-Tipped Feeler That the Screw Trajectories Were Satisfactory and There Was No Cortical Breech.    The Facet Joints Were Then Extensively Decorticated and the Superior Articular Facet at S1 Was Removed Bilaterally.  I Identified the Inferior Aspect of the L5 Pedicles Bilaterally and Is Able to Visualize the Nerve Roots Exiting out the Foramen Bilaterally.  Generous Foraminotomies Were Then Achieved Using the Kerrison Rongeurs.  The shortest interbody device that we had was 22 mm, and one measuring on the CT scan, the overlap between the inferior aspect of the L5 vertebral body and the superior aspect of the S1 vertebral body was 16 mm, so I did not think that I was going to be able to get an interbody device that would seat suitably in the disc space.  Furthermore, it did appear to me that the patient was autofused and probably did not need in her bodies, and I had already achieved the requisite neural element decompression by performing bilateral foraminotomies, so elected to terminate the procedure.    When I was affixing the ingris on the right side, the Fabian elevator slipped and caused a small durotomy over the L5-S1 interspace.  Harvested a small muscle pledget, and I was able to easily close the dura using a single 6-0 Prolene suture and the muscle pledget over the durotomy.  A Valsalva maneuver was then performed to 40 mmHg and no CSF egress was noted.    The facet joints and lamina that remained were  extensively decorticated, and the harvested autograft was combined with allograft (DBM) and packed over the facet joints and lamina bilaterally.  Avitene was used to make a blood patch laid over the muscle pledget, and then Tisseel was further applied to complete the CSF leak repair.    The setscrews were then tightened according to the 's specifications.  Postprocedure CT scan demonstrated satisfactory position of the implanted hardware and satisfactory neural element decompression.    The wound was copiously irrigated with bacitracin saline.  A 10 flat PING drain was tunneled out through separate stab incision and left in the subfascial space.  The fascia and skin were then closed in layers.  Glue, and a sterile dressing were then applied.    Sponge, instrument, and needle counts were all correct at the conclusion of the case.  I performed this procedure with the assistance of Kyra Nogueira, physician assistant.     Electronically signed by Kevin Jacinto MD at 2017 11:56 AM           Physician Progress Notes (all)      Kevin Jacinto MD at 2017  9:07 AM  Version 1 of 1         Subjective: No events overnight; resting comfortably    Objective:    Vitals:    17 0814   BP: 117/64   Pulse: 94   Resp:    Temp: 97.4 °F (36.3 °C)   SpO2: 99%     Pulse  Av.8  Min: 94  Max: 128  Systolic (24hrs), Av , Min:100 , Max:130     Diastolic (24hrs), Av, Min:56, Max:93    Temp (24hrs), Av.9 °F (36.6 °C), Min:97.4 °F (36.3 °C), Max:98.2 °F (36.8 °C)      Incision c/d/i  Denies leg pain  Full motor strength BLE proximally & distally    Lab Results   Component Value Date     12/10/2017       A/P:   Ambulate today at around noon  PT/OT  Continue PING drain  Pain control  Anticipate d/c tomorrow or Friday       Electronically signed by Kevin Jacinto MD at 2017  9:09 AM        Consult Notes (all)     No notes of this type exist for this encounter.

## 2017-12-13 NOTE — PLAN OF CARE
Problem: Patient Care Overview (Adult)  Goal: Plan of Care Review  Outcome: Ongoing (interventions implemented as appropriate)    12/13/17 0552   Coping/Psychosocial Response Interventions   Plan Of Care Reviewed With patient;mother   Patient Care Overview   Progress progress toward functional goals as expected   Outcome Evaluation   Outcome Summary/Follow up Plan Pt VSS. Dressing to back C,D,I. Pt remained HOB flat with strict bedrest. PING drain drainage documented. FC in place, colon care complete. Pt rested well.          Problem: Perioperative Period (Adult)  Goal: Signs and Symptoms of Listed Potential Problems Will be Absent or Manageable (Perioperative Period)  Outcome: Ongoing (interventions implemented as appropriate)    Problem: Laminectomy/Foraminotomy/Discectomy (Adult)  Goal: Signs and Symptoms of Listed Potential Problems Will be Absent or Manageable (Laminectomy/Foraminotomy/Discectomy)  Outcome: Ongoing (interventions implemented as appropriate)

## 2017-12-13 NOTE — PROGRESS NOTES
Subjective: No events overnight; resting comfortably    Objective:    Vitals:    17 0814   BP: 117/64   Pulse: 94   Resp:    Temp: 97.4 °F (36.3 °C)   SpO2: 99%     Pulse  Av.8  Min: 94  Max: 128  Systolic (24hrs), Av , Min:100 , Max:130     Diastolic (24hrs), Av, Min:56, Max:93    Temp (24hrs), Av.9 °F (36.6 °C), Min:97.4 °F (36.3 °C), Max:98.2 °F (36.8 °C)      Incision c/d/i  Denies leg pain  Full motor strength BLE proximally & distally    Lab Results   Component Value Date     12/10/2017       A/P:   Ambulate today at around noon  PT/OT  Continue PING drain  Pain control  Anticipate d/c tomorrow or Friday

## 2017-12-14 PROCEDURE — 97530 THERAPEUTIC ACTIVITIES: CPT

## 2017-12-14 PROCEDURE — 97165 OT EVAL LOW COMPLEX 30 MIN: CPT

## 2017-12-14 PROCEDURE — 25010000002 ENOXAPARIN PER 10 MG: Performed by: NEUROLOGICAL SURGERY

## 2017-12-14 PROCEDURE — 97161 PT EVAL LOW COMPLEX 20 MIN: CPT

## 2017-12-14 PROCEDURE — 99024 POSTOP FOLLOW-UP VISIT: CPT | Performed by: PHYSICIAN ASSISTANT

## 2017-12-14 PROCEDURE — 97110 THERAPEUTIC EXERCISES: CPT

## 2017-12-14 RX ORDER — DIAZEPAM 5 MG/1
5 TABLET ORAL EVERY 6 HOURS
Status: DISCONTINUED | OUTPATIENT
Start: 2017-12-14 | End: 2017-12-16 | Stop reason: HOSPADM

## 2017-12-14 RX ORDER — OXYCODONE AND ACETAMINOPHEN 7.5; 325 MG/1; MG/1
1 TABLET ORAL EVERY 4 HOURS PRN
Status: DISCONTINUED | OUTPATIENT
Start: 2017-12-14 | End: 2017-12-16 | Stop reason: HOSPADM

## 2017-12-14 RX ADMIN — DOCUSATE SODIUM 100 MG: 100 CAPSULE, LIQUID FILLED ORAL at 02:32

## 2017-12-14 RX ADMIN — DIAZEPAM 5 MG: 5 TABLET ORAL at 08:44

## 2017-12-14 RX ADMIN — OXYCODONE HYDROCHLORIDE AND ACETAMINOPHEN 1 TABLET: 7.5; 325 TABLET ORAL at 17:04

## 2017-12-14 RX ADMIN — OXYCODONE HYDROCHLORIDE AND ACETAMINOPHEN 1 TABLET: 7.5; 325 TABLET ORAL at 20:44

## 2017-12-14 RX ADMIN — ENOXAPARIN SODIUM 40 MG: 40 INJECTION SUBCUTANEOUS at 08:44

## 2017-12-14 RX ADMIN — DOCUSATE SODIUM 100 MG: 100 CAPSULE, LIQUID FILLED ORAL at 17:07

## 2017-12-14 RX ADMIN — OXYCODONE AND ACETAMINOPHEN 1 TABLET: 5; 325 TABLET ORAL at 02:32

## 2017-12-14 RX ADMIN — OXYCODONE AND ACETAMINOPHEN 1 TABLET: 5; 325 TABLET ORAL at 07:39

## 2017-12-14 RX ADMIN — DIAZEPAM 5 MG: 5 TABLET ORAL at 15:03

## 2017-12-14 RX ADMIN — OXYCODONE HYDROCHLORIDE AND ACETAMINOPHEN 1 TABLET: 7.5; 325 TABLET ORAL at 11:59

## 2017-12-14 RX ADMIN — DIAZEPAM 5 MG: 5 TABLET ORAL at 20:44

## 2017-12-14 RX ADMIN — PREGABALIN 75 MG: 75 CAPSULE ORAL at 20:44

## 2017-12-14 RX ADMIN — POLYETHYLENE GLYCOL 3350 17 G: 17 POWDER, FOR SOLUTION ORAL at 20:51

## 2017-12-14 RX ADMIN — PREGABALIN 75 MG: 75 CAPSULE ORAL at 08:44

## 2017-12-14 NOTE — PLAN OF CARE
Problem: Patient Care Overview (Adult)  Goal: Plan of Care Review  Outcome: Outcome(s) achieved Date Met:  12/14/17 12/14/17 1051   Coping/Psychosocial Response Interventions   Plan Of Care Reviewed With patient   Patient Care Overview   Progress progress toward functional goals as expected   Outcome Evaluation   Outcome Summary/Follow up Plan OT eval. completed. Post education pt. demonstated log roll in and out of bed with supervision, sit to stand and toilet transfer S, and abilty to dress self with AE maintaining spinal prec. post education. NO further skilled OT services needed. Plans home with assist.         Problem: Inpatient Occupational Therapy  Goal: Bed Mobility Goal LTG- OT  Outcome: Outcome(s) achieved Date Met:  12/14/17 12/14/17 1051   Bed Mobility OT LTG   Bed Mobility OT LTG, Date Established 12/14/17   Bed Mobility OT LTG, Time to Achieve 1 day   Bed Mobility OT LTG, Activity Type all bed mobility  (log roll)   Bed Mobility OT LTG, [place] supervision required   Bed Mobility OT LTG, Outcome goal met       Goal: Transfer Training Goal 1 LTG- OT  Outcome: Outcome(s) achieved Date Met:  12/14/17 12/14/17 1051   Transfer Training OT LTG   Transfer Training OT LTG, Date Established 12/14/17   Transfer Training OT LTG, Time to Achieve 1 day   Transfer Training OT LTG, Activity Type sit to stand/stand to sit;toilet   Transfer Training OT LTG, [place] supervision required   Transfer Training OT LTG, Assist Device walker, rolling  (ETS)   Transfer Training OT LTG, Outcome goal met       Goal: Patient Education Goal LTG- OT  Outcome: Outcome(s) achieved Date Met:  12/14/17 12/14/17 1051   Patient Education OT LTG   Patient Education OT LTG, Date Established 12/14/17   Patient Education OT LTG, Time to Achieve 1 day   Patient Education OT LTG, Education Type written program;HEP;precautions per surgeon;home safety;adaptive equipment mgmt   Patient Education OT LTG, Education  Understanding demonstrates adequately;verbalizes understanding   Patient Education OT LTG Outcome goal met       Goal: LB Dressing Goal LTG- OT  Outcome: Outcome(s) achieved Date Met:  12/14/17 12/14/17 1051   LB Dressing OT LTG   LB Dressing Goal OT LTG, Date Established 12/14/17   LB Dressing Goal OT LTG, Time to Achieve 1 day   LB Dressing Goal OT LTG, Activity Type donning socks and shoes.   LB Dressing Goal OT LTG, [place] supervision required;verbal cues required   LB Dressing Goal OT LTG, Adaptive Equipment sock-aid;reacher;shoe horn, long handled   LB Dressing Goal OT LTG, Outcome goal met

## 2017-12-14 NOTE — PLAN OF CARE
Problem: Patient Care Overview (Adult)  Goal: Plan of Care Review  Outcome: Ongoing (interventions implemented as appropriate)    12/14/17 1745   Coping/Psychosocial Response Interventions   Plan Of Care Reviewed With patient   Patient Care Overview   Progress improving   Outcome Evaluation   Outcome Summary/Follow up Plan Drain d/c'd, dressing changed. prn pain med given as scheduled, vss.          Problem: Perioperative Period (Adult)  Goal: Signs and Symptoms of Listed Potential Problems Will be Absent or Manageable (Perioperative Period)  Outcome: Ongoing (interventions implemented as appropriate)    12/14/17 1745   Perioperative Period   Problems Assessed (Perioperative Period) all   Problems Present (Perioperative Period) pain         Problem: Laminectomy/Foraminotomy/Discectomy (Adult)  Goal: Signs and Symptoms of Listed Potential Problems Will be Absent or Manageable (Laminectomy/Foraminotomy/Discectomy)    12/14/17 1745   Laminectomy/Foraminotomy/Discectomy   Problems Assessed (Laminectomy/Laminotomy/Discectomy) all   Problems Present (Laminectomy/Laminotomy/Discectomy) pain

## 2017-12-14 NOTE — PLAN OF CARE
Problem: Patient Care Overview (Adult)  Goal: Plan of Care Review  Outcome: Ongoing (interventions implemented as appropriate)    12/14/17 0926   Coping/Psychosocial Response Interventions   Plan Of Care Reviewed With patient;family   Patient Care Overview   Progress progress toward functional goals as expected   Outcome Evaluation   Outcome Summary/Follow up Plan PT eval complete. Pt ambulated 300 feet with CGA and RW, progressed to stair training x3 steps. Pt educated on spinal precautions and log roll technique. Recommend d/c home with assist, anticipating d/c today.          Problem: Laminectomy/Foraminotomy/Discectomy (Adult)  Goal: Signs and Symptoms of Listed Potential Problems Will be Absent or Manageable (Laminectomy/Foraminotomy/Discectomy)  Outcome: Ongoing (interventions implemented as appropriate)    12/14/17 0926   Laminectomy/Foraminotomy/Discectomy   Problems Assessed (Laminectomy/Laminotomy/Discectomy) pain;functional decline/self-care deficit   Problems Present (Laminectomy/Laminotomy/Discectomy) pain;functional decline/self-care deficit         Problem: Inpatient Physical Therapy  Goal: Bed Mobility Goal LTG- PT  Outcome: Ongoing (interventions implemented as appropriate)    12/14/17 0926   Bed Mobility PT LTG   Bed Mobility PT LTG, Date Established 12/14/17   Bed Mobility PT LTG, Time to Achieve 3 days   Bed Mobility PT LTG, Activity Type supine to sit/sit to supine  (log roll technique)   Bed Mobility PT LTG, [place] Level independent       Goal: Transfer Training Goal 1 LTG- PT  Outcome: Ongoing (interventions implemented as appropriate)    12/14/17 0926   Transfer Training PT LTG   Transfer Training PT LTG, Date Established 12/14/17   Transfer Training PT LTG, Time to Achieve 3 days   Transfer Training PT LTG, Activity Type sit to stand/stand to sit   Transfer Training PT LTG, [place] Level conditional independence   Transfer Training PT LTG, Assist Device walker, rolling       Goal:  Gait Training Goal LTG- PT  Outcome: Ongoing (interventions implemented as appropriate)    12/14/17 0926   Gait Training PT LTG   Gait Training Goal PT LTG, Date Established 12/14/17   Gait Training Goal PT LTG, Time to Achieve 3 days   Gait Training Goal PT LTG, [place] Level conditional independence   Gait Training Goal PT LTG, Assist Device walker, rolling   Gait Training Goal PT LTG, Distance to Achieve 500 feet       Goal: Stair Training Goal LTG- PT  Outcome: Ongoing (interventions implemented as appropriate)    12/14/17 0926   Stair Training PT LTG   Stair Training Goal PT LTG, Date Established 12/14/17   Stair Training Goal PT LTG, Time to Achieve 3 days   Stair Training Goal PT LTG, Number of Steps 10   Stair Training Goal PT LTG, [place] Level conditional independence   Stair Training Goal PT LTG, Assist Device 1 handrail

## 2017-12-14 NOTE — THERAPY EVALUATION
Acute Care - Physical Therapy Initial Evaluation   [facility]     Patient Name: Alvin Gordon  : 1980  MRN: [id number]  Today's Date: 2017   Onset of Illness/Injury or Date of Surgery Date: 17  Date of Referral to PT: 17  Referring Physician: MD Orville      Admit Date: 2017     Visit Dx:    ICD-10-CM ICD-9-CM   1. Impaired functional mobility, balance, gait, and endurance Z74.09 V49.89   2. Pars defect of lumbar spine M43.06 738.4     Patient Active Problem List   Diagnosis   • Anxiety   • Headache, migraine   • Pars defect of lumbar spine     Past Medical History:   Diagnosis Date   • Anxiety    • Back pain    • Herpes simplex    • Migraine headache    • Slow to wake up after anesthesia    • Wears contact lenses      Past Surgical History:   Procedure Laterality Date   • LUMBAR DISCECTOMY FUSION INSTRUMENTATION N/A 2017    Procedure: LUMBAR LAMINECTOMY, POSTERIOR LUMBAR INTERBODY FUSION L5-S1;  Surgeon: Kevin Jacinto MD;  Location: [facility];  Service:    • SHOULDER ARTHROSCOPY Right           PT ASSESSMENT (last 72 hours)      PT Evaluation       17 0856 17 0739    Rehab Evaluation    Document Type evaluation;therapy note (daily note)  -LM     Subjective Information agree to therapy;complains of;pain;weakness  -LM     Patient Effort, Rehab Treatment good  -LM     Symptoms Noted During/After Treatment fatigue  -LM     General Information    Patient Profile Review yes  -LM     Onset of Illness/Injury or Date of Surgery Date 17  -LM     Referring Physician MD Orville  -LM     General Observations Pt received sitting EOB with nsg present.   -LM     Pertinent History Of Current Problem Pt presents for surgical management of low back pain and dysfunction with progressive leg pain that failed to improve with conservative management.  -LM     Precautions/Limitations spinal precautions  -LM     Prior Level of Function min assist:;all household  mobility;community mobility;gait;transfer;bed mobility;ADL's  -LM     Equipment Currently Used at Home none  -LM     Plans/Goals Discussed With patient;agreed upon  -LM     Risks Reviewed patient:;LOB;nausea/vomiting;dizziness;increased discomfort;change in vital signs  -LM     Benefits Reviewed patient:;improve function;increase independence;increase strength;increase balance;decrease pain  -LM     Barriers to Rehab none identified  -LM     Living Environment    Lives With spouse;child(dickson), dependent  -LM     Living Arrangements house  -LM     Home Accessibility bed and bath are not on the first floor;stairs to enter home;stairs within home  -LM     Number of Stairs to Enter Home 2  -LM     Number of Stairs Within Home 20   bed/bath upstairs  -LM     Stair Railings at Home inside, present on right side   no handrails outside  -LM     Living Environment Comment Spouse available to assist as needed.  -LM     Clinical Impression    Date of Referral to PT 12/13/17  -LM     PT Diagnosis s/p L5-S1 lumbar laminectomy posterior lumbar interbody fusion  -LM     Prognosis good  -LM     Patient/Family Goals Statement return home  -LM     Criteria for Skilled Therapeutic Interventions Met yes;treatment indicated  -LM     Rehab Potential good, to achieve stated therapy goals  -LM     Pain Assessment    Pain Assessment 0-10  -LM     Pain Score 6  -LM     Post Pain Score 6  -LM     Pain Type Acute pain  -LM     Pain Location Back  -LM     Pain Orientation Lower  -LM     Pain Intervention(s) Repositioned;Ambulation/increased activity  -LM     Response to Interventions tolerated  -LM     Vision Assessment/Intervention    Visual Impairment WFL  -LM     Cognitive Assessment/Intervention    Current Cognitive/Communication Assessment functional  -LM     Orientation Status oriented x 4  -LM     Follows Commands/Answers Questions 100% of the time;able to follow single-step instructions  -LM     Personal Safety WNL/WFL  -LM     Personal  Safety Interventions gait belt;nonskid shoes/slippers when out of bed;supervised activity  -LM     ROM (Range of Motion)    General ROM head/neck/trunk range of motion deficits identified  -LM     General Head/Neck/Trunk Assessment    ROM Detail trunk limited due to spinal precautions  -LM     MMT (Manual Muscle Testing)    General MMT Assessment no strength deficits identified  -LM     Muscle Tone Assessment    Bilateral Upper Extremities Muscle Tone Assessment  associated movements noted  -SJ    Bilateral Lower Extremities Muscle Tone Assessment  associated movements noted  -SJ    Bed Mobility, Assessment/Treatment    Bed Mob, Supine to Sit, Ball not tested   Pt received sitting EOB  -LM     Bed Mob, Sit to Supine, Ball contact guard assist;verbal cues required  -LM     Bed Mobility, Impairments pain  -LM     Bed Mobility, Comment Verbal cues for log roll sequencing.  -LM     Transfer Assessment/Treatment    Transfers, Sit-Stand Ball contact guard assist;verbal cues required  -LM     Transfers, Stand-Sit Ball contact guard assist;verbal cues required  -LM     Transfers, Sit-Stand-Sit, Assist Device rolling walker  -LM     Transfer, Safety Issues sequencing ability decreased;step length decreased  -LM     Transfer, Impairments pain  -LM     Transfer, Comment Verbal cues to push from bed when standing, reach for bed when sitting, not to bend too far forward to adhere to spinal precautions.  -LM     Gait Assessment/Treatment    Gait, Ball Level contact guard assist;verbal cues required  -LM     Gait, Assistive Device rolling walker  -LM     Gait, Distance (Feet) 300  -LM     Gait, Gait Pattern Analysis swing-through gait  -LM     Gait, Gait Deviations bilateral:;sonia decreased;decreased heel strike;step length decreased;weight-shifting ability decreased  -LM     Gait, Safety Issues sequencing ability decreased;step length decreased  -LM     Gait, Impairments impaired  balance;pain  -LM     Gait, Comment Pt ambulated with step-through pattern at slow pace. Verbal cues for upright posture and decrease BUE weight bearing. Gait limited by pain and fatigue.  -LM     Stairs Assessment/Treatment    Number of Stairs 3  -LM     Stairs, Handrail Location right side (ascending)  -LM     Stairs, [place] Level contact guard assist;verbal cues required  -     Stairs, Technique Used step over step (descending);step over step (ascending)  -LM     Stairs, Safety Issues balance decreased during turns  -LM     Stairs, Impairments impaired balance;pain  -LM     Stairs, Comment Verbal cues on stair sequencing.  -LM     Motor Skills/Interventions    Additional Documentation Balance Skills Training (Group)  -     Balance Skills Training    Sitting-Level of Assistance Independent  -     Sitting-Balance Support Right upper extremity supported;Left upper extremity supported;Feet supported  -     Standing-Level of Assistance Contact guard  -     Static Standing Balance Support assistive device  -     Gait Balance-Level of Assistance Contact guard  -LM     Gait Balance Support assistive device  -     Therapy Exercises    Bilateral Lower Extremities AROM:;5 reps;supine;ankle pumps/circles;glut sets;quad sets;hip IR;hip ER;heel slides;other reps   abd set, arms overhead, bent knee fallout  -     Sensory Assessment/Intervention    Sensory Impairment --   Pt denies n/t in BLEs.  -LM     Positioning and Restraints    Pre-Treatment Position in bed  -LM     Post Treatment Position bed  -LM     In Bed notified nsg;call light within reach;encouraged to call for assist;with family/caregiver;fowlers  -       12/13/17 1554 12/13/17 1400    Muscle Tone Assessment    Bilateral Upper Extremities Muscle Tone Assessment associated movements noted  -SJ associated movements noted  -SJ    Bilateral Lower Extremities Muscle Tone Assessment associated movements noted  -SJ associated movements noted  -SJ       12/13/17 1200 12/13/17 1137    General Information    Equipment Currently Used at Home  none  -MB    Living Environment    Lives With  spouse;child(dickson), dependent  -MB    Living Arrangements  house  -MB    Transportation Available  car;family or friend will provide  -MB    Muscle Tone Assessment    Bilateral Upper Extremities Muscle Tone Assessment associated movements noted  -SJ     Bilateral Lower Extremities Muscle Tone Assessment associated movements noted  -SJ       12/13/17 1020 12/13/17 0730    Muscle Tone Assessment    Bilateral Upper Extremities Muscle Tone Assessment associated movements noted  -SJ associated movements noted  -SJ    Bilateral Lower Extremities Muscle Tone Assessment associated movements noted  -SJ associated movements noted  -SJ      12/12/17 1331 12/12/17 0704    General Information    Equipment Currently Used at Home  none  -MT    Living Environment    Lives With spouse  -DT spouse;child(dickson), dependent  -MT    Living Arrangements house  -DT house  -MT    Home Accessibility bed and bath are not on the first floor  -DT bed and bath on same level;stairs within home  -MT    Number of Stairs to Enter Home 2  -DT     Number of Stairs Within Home 20  -DT     Stair Railings at Home inside, present on left side;inside, present on right side  -DT inside, present on right side  -MT    Type of Financial/Environmental Concern none  -DT none  -MT    Transportation Available none;family or friend will provide  -DT car  -MT    Living Environment Comment none  -DT       User Key  (r) = Recorded By, (t) = Taken By, (c) = Cosigned By    Initials Name Provider Type    MT Kendy Rodriguez, RN Registered Nurse    GABI Chau, RN Case Manager    SUNDAY Alfred, PT Physical Therapist    AMIE Hi RN Registered Nurse    YARON Arias RN Registered Nurse          Physical Therapy Education     Title: PT OT SLP Therapies (Done)     Topic: Physical Therapy (Done)     Point: Mobility  training (Done)    Learning Progress Summary    Learner Readiness Method Response Comment Documented by Status   Patient Acceptance E,D,H NR,VU Issued back therex handout; reviewed HEP, log roll technique, stair sequencing, correct gait mechanics, benefits of activity.  12/14/17 0925 Done   Family Acceptance E,D,H NR,VU Issued back therex handout; reviewed HEP, log roll technique, stair sequencing, correct gait mechanics, benefits of activity.  12/14/17 0925 Done               Point: Home exercise program (Done)    Learning Progress Summary    Learner Readiness Method Response Comment Documented by Status   Patient Acceptance E,D,H NR,VU Issued back therex handout; reviewed HEP, log roll technique, stair sequencing, correct gait mechanics, benefits of activity.  12/14/17 0925 Done   Family Acceptance E,D,H NR,VU Issued back therex handout; reviewed HEP, log roll technique, stair sequencing, correct gait mechanics, benefits of activity.  12/14/17 0925 Done               Point: Body mechanics (Done)    Learning Progress Summary    Learner Readiness Method Response Comment Documented by Status   Patient Acceptance E,D,H NR,VU Issued back therex handout; reviewed HEP, log roll technique, stair sequencing, correct gait mechanics, benefits of activity.  12/14/17 0925 Done   Family Acceptance E,D,H NR,VU Issued back therex handout; reviewed HEP, log roll technique, stair sequencing, correct gait mechanics, benefits of activity.  12/14/17 0925 Done               Point: Precautions (Done)    Learning Progress Summary    Learner Readiness Method Response Comment Documented by Status   Patient Acceptance E,D,H NR,VU Issued back therex handout; reviewed HEP, log roll technique, stair sequencing, correct gait mechanics, benefits of activity.  12/14/17 0925 Done   Family Acceptance E,D,H NR,VU Issued back therex handout; reviewed HEP, log roll technique, stair sequencing, correct gait mechanics, benefits of  activity.  12/14/17 0925 Done                      User Key     Initials Effective Dates Name Provider Type Discipline     06/09/17 -  Sabrina Alfred, PT Physical Therapist PT                PT Recommendation and Plan  Anticipated Equipment Needs At Discharge: front wheeled walker  Anticipated Discharge Disposition: home with assist  Planned Therapy Interventions: balance training, bed mobility training, gait training, home exercise program, patient/family education, stair training, strengthening, transfer training  PT Frequency: daily  Plan of Care Review  Plan Of Care Reviewed With: patient, family  Progress: progress toward functional goals as expected  Outcome Summary/Follow up Plan: PT ana complete. Pt ambulated 300 feet with CGA and RW, progressed to stair training x3 steps. Pt educated on spinal precautions and log roll technique. Recommend d/c home with assist, anticipating d/c today.           IP PT Goals       12/14/17 0926          Bed Mobility PT LTG    Bed Mobility PT LTG, Date Established 12/14/17  -LM      Bed Mobility PT LTG, Time to Achieve 3 days  -LM      Bed Mobility PT LTG, Activity Type supine to sit/sit to supine   log roll technique  -LM      Bed Mobility PT LTG, [place] Level independent  -LM      Transfer Training PT LTG    Transfer Training PT LTG, Date Established 12/14/17  -LM      Transfer Training PT LTG, Time to Achieve 3 days  -LM      Transfer Training PT LTG, Activity Type sit to stand/stand to sit  -LM      Transfer Training PT LTG, [place] Level conditional independence  -LM      Transfer Training PT LTG, Assist Device walker, rolling  -LM      Gait Training PT LTG    Gait Training Goal PT LTG, Date Established 12/14/17  -LM      Gait Training Goal PT LTG, Time to Achieve 3 days  -LM      Gait Training Goal PT LTG, [place] Level conditional independence  -LM      Gait Training Goal PT LTG, Assist Device walker, rolling  -LM      Gait Training Goal PT LTG,  Distance to Achieve 500 feet  -LM      Stair Training PT LTG    Stair Training Goal PT LTG, Date Established 12/14/17  -LM      Stair Training Goal PT LTG, Time to Achieve 3 days  -LM      Stair Training Goal PT LTG, Number of Steps 10  -LM      Stair Training Goal PT LTG, [place] Level conditional independence  -LM      Stair Training Goal PT LTG, Assist Device 1 handrail  -LM        User Key  (r) = Recorded By, (t) = Taken By, (c) = Cosigned By    Initials Name Provider Type    SUNDAY Alfred PT Physical Therapist                Outcome Measures       12/14/17 0856          How much help from another person do you currently need...    Turning from your back to your side while in flat bed without using bedrails? 3  -LM      Moving from lying on back to sitting on the side of a flat bed without bedrails? 3  -LM      Moving to and from a bed to a chair (including a wheelchair)? 3  -LM      Standing up from a chair using your arms (e.g., wheelchair, bedside chair)? 3  -LM      Climbing 3-5 steps with a railing? 3  -LM      To walk in hospital room? 3  -LM      AM-PAC 6 Clicks Score 18  -LM      Functional Assessment    Outcome Measure Options AM-PAC 6 Clicks Basic Mobility (PT)  -LM        User Key  (r) = Recorded By, (t) = Taken By, (c) = Cosigned By    Initials Name Provider Type    SUNDAY Alfred PT Physical Therapist           Time Calculation:         PT Charges       12/14/17 0928          Time Calculation    Start Time 0856  -LM      PT Received On 12/14/17  -LM      PT Goal Re-Cert Due Date 12/24/17  -LM      Time Calculation- PT    Total Timed Code Minutes- PT 10 minute(s)  -LM        User Key  (r) = Recorded By, (t) = Taken By, (c) = Cosigned By    Initials Name Provider Type    SUNDAY Alfred PT Physical Therapist          Therapy Charges for Today     Code Description Service Date Service Provider Modifiers Qty    24656918426 HC PT EVAL LOW COMPLEXITY 3 12/14/2017 Sabrina Alfred PT GP  1    [id number]  PT THER PROC EA 15 MIN 12/14/2017 Sabrina Alfred, PT GP 1          PT G-Codes  Outcome Measure Options: AM-PAC 6 Clicks Basic Mobility (PT)      Sabrina Alfred PT  12/14/2017

## 2017-12-14 NOTE — PLAN OF CARE
Problem: Patient Care Overview (Adult)  Goal: Plan of Care Review  Outcome: Ongoing (interventions implemented as appropriate)    12/14/17 0353   Coping/Psychosocial Response Interventions   Plan Of Care Reviewed With patient   Patient Care Overview   Progress progress toward functional goals as expected   Outcome Evaluation   Outcome Summary/Follow up Plan Pt VSS. Dressing intact. Pt c/o low back pain. PING drainage intact, drainage documented. Rested well.          Problem: Perioperative Period (Adult)  Goal: Signs and Symptoms of Listed Potential Problems Will be Absent or Manageable (Perioperative Period)  Outcome: Ongoing (interventions implemented as appropriate)    Problem: Laminectomy/Foraminotomy/Discectomy (Adult)  Goal: Signs and Symptoms of Listed Potential Problems Will be Absent or Manageable (Laminectomy/Foraminotomy/Discectomy)  Outcome: Ongoing (interventions implemented as appropriate)

## 2017-12-14 NOTE — PAYOR COMM NOTE
"Alvin Gordon (37 y.o. Male)     Date of Birth Social Security Number Address Home Phone MRN    1980  [address]  [facility] [address] [phone] [id number]    Amish Marital Status          Episcopal        Admission Date Admission Type Admitting Provider Attending Provider Department, Room/Bed    12/12/17 Elective Kevin Jacinto MD Newman, Kevin Pickens MD [address], [address]/1    Discharge Date Discharge Disposition Discharge Destination                      Attending Provider: Kevin Jacinto MD     Allergies:  No Known Allergies    Isolation:  None   Infection:  None   Code Status:  FULL    Ht:  167.6 cm (66\")   Wt:  93 kg (205 lb)    Admission Cmt:  None   Principal Problem:  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                 Active Insurance as of 12/12/2017     Primary Coverage     Payor Plan Insurance Group Employer/Plan Group    [employer] BLUE CROSS [employer] BLUE CROSS BLUE [employer]IELD PPO [id number]     Payor Plan Address Payor Plan Phone Number Effective From Effective To    [address] [phone] 1/1/2008     [place] [id number]       Subscriber Name Subscriber Birth Date Member ID       ALVIN GORDON 1980 [id number]                 Emergency Contacts      (Rel.) Home Phone Work Phone Mobile Phone    Robyn Gordon (Mother) [phone] -- --    MayLyric (Spouse) [phone] -- --            Vital Signs (last 24 hours)       12/13 0700  -  12/14 0659 12/14 0700  -  12/14 1027   Most Recent    Temp (°F) 97.4 -  99.3      97.2     97.2 (36.2)    Heart Rate 79 -  96      87     87    Resp   18      18     18    /65 -  129/80      123/77     123/77    SpO2 (%) 92 -  99      92     92          Hospital Medications (active)       Dose Frequency Start End    bisacodyl (DULCOLAX) suppository 10 mg 10 mg Daily PRN 12/12/2017     Sig - Route: Insert 1 suppository into the rectum Daily As Needed " "for Constipation. - Rectal    diazePAM (VALIUM) tablet 5 mg 5 mg Every 6 Hours 12/14/2017 12/24/2017    Sig - Route: Take 1 tablet by mouth Every 6 (Six) Hours. - Oral    docusate sodium (COLACE) capsule 100 mg 100 mg 2 Times Daily PRN 12/12/2017     Sig - Route: Take 1 capsule by mouth 2 (Two) Times a Day As Needed for Constipation. - Oral    enoxaparin (LOVENOX) syringe 40 mg 40 mg Daily 12/14/2017     Sig - Route: Inject 0.4 mL under the skin Daily. - Subcutaneous    lactated ringers infusion 9 mL/hr Continuous 12/12/2017     Sig - Route: Infuse 9 mL/hr into a venous catheter Continuous. - Intravenous    ondansetron (ZOFRAN) injection 4 mg 4 mg Every 6 Hours PRN 12/12/2017     Sig - Route: Infuse 2 mL into a venous catheter Every 6 (Six) Hours As Needed for Nausea or Vomiting. - Intravenous    Linked Group 1:  \"Or\" Linked Group Details        ondansetron (ZOFRAN) tablet 4 mg 4 mg Every 6 Hours PRN 12/12/2017     Sig - Route: Take 1 tablet by mouth Every 6 (Six) Hours As Needed for Nausea or Vomiting. - Oral    Linked Group 1:  \"Or\" Linked Group Details        oxyCODONE (oxyCONTIN) 12 hr tablet 10 mg 10 mg Every 8 Hours PRN 12/12/2017 12/22/2017    Sig - Route: Take 1 tablet by mouth Every 8 (Eight) Hours As Needed for Moderate Pain . - Oral    oxyCODONE-acetaminophen (PERCOCET) 7.5-325 MG per tablet 1 tablet 1 tablet Every 4 Hours PRN 12/14/2017 12/24/2017    Sig - Route: Take 1 tablet by mouth Every 4 (Four) Hours As Needed for Moderate Pain  or Severe Pain . - Oral    polyethylene glycol 3350 powder (packet) 17 g Daily PRN 12/12/2017     Sig - Route: Take 17 g by mouth Daily As Needed for Constipation. - Oral    pregabalin (LYRICA) capsule 75 mg 75 mg Every 12 Hours Scheduled 12/12/2017     Sig - Route: Take 1 capsule by mouth Every 12 (Twelve) Hours. - Oral    sennosides-docusate sodium (SENOKOT-S) 8.6-50 MG tablet 1 tablet 1 tablet Nightly PRN 12/12/2017     Sig - Route: Take 1 tablet by mouth At Night As " "Needed for Constipation. - Oral    sodium chloride 0.9 % flush 1-10 mL 1-10 mL As Needed 12/12/2017     Sig - Route: Infuse 1-10 mL into a venous catheter As Needed for Line Care. - Intravenous    diazePAM (VALIUM) injection 5 mg (Discontinued) 5 mg Every 4 Hours PRN 12/12/2017 12/14/2017    Sig - Route: Infuse 1 mL into a venous catheter Every 4 (Four) Hours As Needed for Muscle Spasms. - Intravenous    HYDROmorphone (DILAUDID) injection 0.5 mg (Discontinued) 0.5 mg Every 2 Hours PRN 12/12/2017 12/14/2017    Sig - Route: Infuse 0.5 mL into a venous catheter Every 2 (Two) Hours As Needed for Severe Pain . - Intravenous    Linked Group 2:  \"And\" Linked Group Details        naloxone (NARCAN) injection 0.4 mg (Discontinued) 0.4 mg Every 5 Minutes PRN 12/12/2017 12/14/2017    Sig - Route: Infuse 1 mL into a venous catheter Every 5 (Five) Minutes As Needed for Respiratory Depression. - Intravenous    Linked Group 2:  \"And\" Linked Group Details        oxyCODONE-acetaminophen (PERCOCET) 5-325 MG per tablet 1 tablet (Discontinued) 1 tablet Every 4 Hours PRN 12/12/2017 12/14/2017    Sig - Route: Take 1 tablet by mouth Every 4 (Four) Hours As Needed for Severe Pain . - Oral          Lab Results (last 24 hours)     Procedure Component Value Units Date/Time    POC Glucose Once [[id number]]  (Normal) Collected:  12/13/17 1557    Specimen:  Blood Updated:  12/13/17 1611     Glucose 123 mg/dL     Narrative:       Verify with Lab Meter: [id number] : [id number] Roosevelt Leonard        Imaging Results (last 24 hours)     ** No results found for the last 24 hours. **        Operative/Procedure Notes (last 24 hours) (Notes from 12/13/2017 10:28 AM through 12/14/2017 10:28 AM)     No notes of this type exist for this encounter.           Physician Progress Notes (last 24 hours) (Notes from 12/13/2017 10:28 AM through 12/14/2017 10:28 AM)      Kyra Nogueira PA-C at 12/14/2017  7:59 AM  Version 1 of 1         NEUROSURGERY PROGRESS " "NOTE    Interval History:   Mr. Gordon is doing well. He complains of incisional pain, but not of leg pain. He has been up ambulating and his incision remains clean and dry. He noted a moderate headache yesterday, but thinks it may be due in part to less caffeine intake than he is used to.     Vital Signs  Blood pressure 123/77, pulse 87, temperature 97.2 °F (36.2 °C), temperature source Tympanic, resp. rate 18, height 167.6 cm (66\"), weight 93 kg (205 lb), SpO2 92 %.    Physical Exam:  Patient awake, alert, and oriented.  LE strength and sensation are intact and equal bilaterally. PING drain has 10-15ml of serosanguinous drainage.  Incision is clean and dry.      Results Review:      Results from last 7 days  Lab Units 12/10/17  0957   WBC 10*3/mm3 7.27   HEMOGLOBIN g/dL 16.6   HEMATOCRIT % 46.3   PLATELETS 10*3/mm3 209       I/O last 3 completed shifts:  In: 940 [P.O.:940]  Out: 2770 [Urine:2450; Other:320]  I/O this shift:  In: -   Out: 60 [Other:60]      ASSESSMENT:/PLAN:   PING out this morning  Ambulate with PT, work with OT  Add scheduled valium to pain meds  Possibly home this afternoon if patient is doing well.     Kyra Nogueira PA-C  12/14/17  7:59 AM     Electronically signed by Kyra Nogueira PA-C at 12/14/2017  8:02 AM        Consult Notes (last 24 hours) (Notes from 12/13/2017 10:28 AM through 12/14/2017 10:28 AM)     No notes of this type exist for this encounter.        "

## 2017-12-14 NOTE — PROGRESS NOTES
"NEUROSURGERY PROGRESS NOTE    Interval History:   Mr. Gordon is doing well. He complains of incisional pain, but not of leg pain. He has been up ambulating and his incision remains clean and dry. He noted a moderate headache yesterday, but thinks it may be due in part to less caffeine intake than he is used to.     Vital Signs  Blood pressure 123/77, pulse 87, temperature 97.2 °F (36.2 °C), temperature source Tympanic, resp. rate 18, height 167.6 cm (66\"), weight 93 kg (205 lb), SpO2 92 %.    Physical Exam:  Patient awake, alert, and oriented.  LE strength and sensation are intact and equal bilaterally. PING drain has 10-15ml of serosanguinous drainage.  Incision is clean and dry.      Results Review:      Results from last 7 days  Lab Units 12/10/17  0957   WBC 10*3/mm3 7.27   HEMOGLOBIN g/dL 16.6   HEMATOCRIT % 46.3   PLATELETS 10*3/mm3 209       I/O last 3 completed shifts:  In: 940 [P.O.:940]  Out: 2770 [Urine:2450; Other:320]  I/O this shift:  In: -   Out: 60 [Other:60]      ASSESSMENT:/PLAN:   PING out this morning  Ambulate with PT, work with OT  Add scheduled valium to pain meds  Possibly home this afternoon if patient is doing well.     Kyra Nogueira PA-C  12/14/17  7:59 AM  "

## 2017-12-14 NOTE — THERAPY DISCHARGE NOTE
Acute Care - Occupational Therapy Initial Eval/Discharge  [facility]     Patient Name: Alvin Gordon  : 1980  MRN: [id number]  Today's Date: 2017  Onset of Illness/Injury or Date of Surgery Date: 17  Date of Referral to OT: 17  Referring Physician: ARGENTINA Nogueira      Admit Date: 2017       ICD-10-CM ICD-9-CM   1. Impaired functional mobility, balance, gait, and endurance Z74.09 V49.89   2. Pars defect of lumbar spine M43.06 738.4   3. Impaired mobility and ADLs Z74.09 799.89     Patient Active Problem List   Diagnosis   • Anxiety   • Headache, migraine   • Pars defect of lumbar spine     Past Medical History:   Diagnosis Date   • Anxiety    • Back pain    • Herpes simplex    • Migraine headache    • Slow to wake up after anesthesia    • Wears contact lenses      Past Surgical History:   Procedure Laterality Date   • LUMBAR DISCECTOMY FUSION INSTRUMENTATION N/A 2017    Procedure: LUMBAR LAMINECTOMY, POSTERIOR LUMBAR INTERBODY FUSION L5-S1;  Surgeon: Kevin Jacinto MD;  Location: [facility];  Service:    • SHOULDER ARTHROSCOPY Right           OT ASSESSMENT FLOWSHEET (last 72 hours)      OT Evaluation       17 1055 17 1000 17 0856 17 0830 17 0739    Rehab Evaluation    Document Type  evaluation;therapy note (daily note);discharge summary  -CARLOS evaluation;therapy note (daily note)  -LM      Subjective Information  agree to therapy;complains of;pain  -CARLOS agree to therapy;complains of;pain;weakness  -LM      Patient Effort, Rehab Treatment  good  -CARLOS good  -LM      Symptoms Noted During/After Treatment  fatigue  -CARLOS fatigue  -LM      General Information    Patient Profile Review  yes  -CARLOS yes  -LM      Onset of Illness/Injury or Date of Surgery Date  17  -CARLOS 17  -LM      Referring Physician  ARGENTINA Nogueira  -CARLOS Jacinto MD  -      General Observations  Pt. supine in bed with mother present.  Pt. with drainage noted in bed and on gown.   -CARLOS Pt received sitting EOB with nsg present.   -LM      Pertinent History Of Current Problem  Pt. with greater than one year history of back lumbar pain radiating into feet.  Pt. with continuous pain and failed conserative measures.  Pt. admitted for surgical intervention.  Pt. noted with grade 2 spondylolisthesis L5 on S1, pars defect L5-S1, foraminal stenosis L5-S1 B.  L5-S1 lumbar laminectomy, posterior lumbar fusion on 12/12.  -CARLOS Pt presents for surgical management of low back pain and dysfunction with progressive leg pain that failed to improve with conservative management.  -LM      Precautions/Limitations  spinal precautions  -CARLOS spinal precautions  -LM      Prior Level of Function  min assist:;all household mobility;ADL's;gait;transfer;mod assist:;home management   continuos pain with all activity.  -CARLOS min assist:;all household mobility;community mobility;gait;transfer;bed mobility;ADL's  -LM      Equipment Currently Used at Home  none  -CARLOS none  -LM      Plans/Goals Discussed With  patient;agreed upon  -CARLOS patient;agreed upon  -LM      Risks Reviewed  patient:;LOB;nausea/vomiting;increased drainage  -CARLOS patient:;LOB;nausea/vomiting;dizziness;increased discomfort;change in vital signs  -LM      Benefits Reviewed  patient:;improve function;increase independence;increase knowledge  -CARLOS patient:;improve function;increase independence;increase strength;increase balance;decrease pain  -LM      Barriers to Rehab  none identified  -CARLOS none identified  -LM      Living Environment    Lives With  spouse;child(dickson), dependent  -CARLOS spouse;child(dickson), dependent  -LM      Living Arrangements  house  -CARLOS house  -LM      Home Accessibility  stairs to enter home;bed and bath on same level  -CARLOS bed and bath are not on the first floor;stairs to enter home;stairs within home  -LM      Number of Stairs to Enter Home  2  -CARLOS 2  -LM      Number of Stairs Within Home  20  -CARLOS 20   bed/bath upstairs  -LM      Stair Railings at  Home   inside, present on right side   no handrails outside  -LM      Living Environment Comment  spouse and mother intially there to assist alone with children over Leni break.  -CARLOS Spouse available to assist as needed.  -LM      Clinical Impression    Date of Referral to OT  12/13/17  -CARLOS       OT Diagnosis  Impaired ADL and mobility indep due to spinal sx with spinal precuations.  -CARLOS       Patient/Family Goals Statement  Pt. wants to return to PLOF with decreased back pain.  -CARLOS       Criteria for Skilled Therapeutic Interventions Met  yes;treatment indicated  -CARLOS       Rehab Potential  good, to achieve stated therapy goals  -CARLOS       Therapy Frequency  evaluation only  -CARLOS       Anticipated Equipment Needs At Discharge  --   reacher, shoe horn, bath sponge issued, need order sockaid  -CARLOS       Anticipated Discharge Disposition home with assist  -CARLOS home with assist  -CARLOS       Pain Assessment    Pain Assessment  0-10  -CARLOS 0-10  -LM      Pain Score  5  -CARLOS 6  -LM      Post Pain Score  5  -CARLOS 6  -LM      Pain Type  Acute pain  -CARLOS Acute pain  -LM      Pain Location  Back  -CARLOS Back  -LM      Pain Orientation  Lower  -CARLOS Lower  -LM      Pain Intervention(s)  Ambulation/increased activity;Repositioned;Medication (See MAR)   nurse gave meds prior to tx.  -CARLOS Repositioned;Ambulation/increased activity  -LM      Response to Interventions  no change  -CARLOS tolerated  -LM      Vision Assessment/Intervention    Visual Impairment  WFL  -CARLOS WFL  -LM      Cognitive Assessment/Intervention    Current Cognitive/Communication Assessment  functional  -CARLOS functional  -LM      Orientation Status  oriented x 4  -CARLOS oriented x 4  -LM      Follows Commands/Answers Questions  100% of the time;able to follow single-step instructions  -CARLOS 100% of the time;able to follow single-step instructions  -LM      Personal Safety  WNL/WFL  -CARLOS WNL/WFL  -LM      Personal Safety Interventions  fall prevention program maintained;gait belt;nonskid  shoes/slippers when out of bed  -CARLOS gait belt;nonskid shoes/slippers when out of bed;supervised activity  -LM      ROM (Range of Motion)    General ROM  no range of motion deficits identified  -CARLOS head/neck/trunk range of motion deficits identified  -LM      General ROM Detail  UE, see PT for other.  -CARLOS       General Head/Neck/Trunk Assessment    ROM Detail   trunk limited due to spinal precautions  -LM      MMT (Manual Muscle Testing)    General MMT Assessment  no strength deficits identified  -CARLOS no strength deficits identified  -LM      General MMT Assessment Detail  UE, see PT for other.  -CARLOS       Muscle Tone Assessment    Bilateral Upper Extremities Muscle Tone Assessment  associated movements noted  -SJ  associated movements noted  -SJ associated movements noted  -SJ    Bilateral Lower Extremities Muscle Tone Assessment  associated movements noted  -SJ  associated movements noted  -SJ associated movements noted  -SJ    Bed Mobility, Assessment/Treatment    Bed Mob, Supine to Sit, [place]  independent;verbal cues required  -CARLOS not tested   Pt received sitting EOB  -LM      Bed Mob, Sit to Supine, [place]  independent;verbal cues required  -CARLOS contact guard assist;verbal cues required  -LM      Bed Mobility, Impairments  pain  -CARLOS pain  -LM      Bed Mobility, Comment  post education pt. good demonstration of log roll.  -CARLOS Verbal cues for log roll sequencing.  -LM      Transfer Assessment/Treatment    Transfers, Sit-Stand [place]  supervision required;verbal cues required  -CARLOS contact guard assist;verbal cues required  -LM      Transfers, Stand-Sit [place]  supervision required;verbal cues required  -CARLOS contact guard assist;verbal cues required  -LM      Transfers, Sit-Stand-Sit, Assist Device  rolling walker  -CARLOS rolling walker  -LM      Toilet Transfer, [place]  supervision required;verbal cues required  -CARLOS       Toilet Transfer, Assistive Device  rolling walker  -CARLOS       Transfer,  Safety Issues   sequencing ability decreased;step length decreased  -LM      Transfer, Impairments  pain  -CARLOS pain  -LM      Transfer, Comment  Pt. needed cues for hand placement.  -CARLOS Verbal cues to push from bed when standing, reach for bed when sitting, not to bend too far forward to adhere to spinal precautions.  -LM      Functional Mobility    Functional Mobility- Ind. Level  supervision required  -CARLOS       Functional Mobility- Device  rolling walker  -CARLOS       Functional Mobility-Distance (Feet)  35  -CARLOS       Functional Mobility- Comment  Pt. tending to carry wx. cues to lower to floor.  -CARLOS       Stairs Assessment/Treatment    Number of Stairs   3  -LM      Stairs, Handrail Location   right side (ascending)  -LM      Stairs, [place] Level   contact guard assist;verbal cues required  -LM      Stairs, Technique Used   step over step (descending);step over step (ascending)  -LM      Stairs, Safety Issues   balance decreased during turns  -LM      Stairs, Impairments   impaired balance;pain  -LM      Stairs, Comment   Verbal cues on stair sequencing.  -LM      Lower Body Bathing Assessment/Training    LB Bathing Assess/Train, Comment  Pt. issued lh bath sponge and toe sponge and educated how to use at home in shower to wash LE's maintaining spinal prec.  -CARLOS       Lower Body Dressing Assessment/Training    LB Dressing Assess/Train, Clothing Type  doffing:;donning:;shoes;socks  -CARLOS       LB Dressing Assess/Train, Assist Device  sock-aid;long-handled shoe horn;reacher  -CARLOS       LB Dressing Assess/Train, Position  sitting  -CARLOS       LB Dressing Assess/Train, [place]  supervision required;verbal cues required  -CARLOS       LB Dressing Assess/Train, Impairments  decreased flexibility;pain   spinal prec  -CARLOS       LB Dressing Assess/Train, Comment  Post education and demonstration with AE pt. able to demonstrate back use to tho and doff shoes and socks.  OT educ. on use for pants and undergarment.  Pt. ginger.  under.  -CARLOS       Toileting Assessment/Training    Toileting Assess/Train, Comment  Educated pt. on toilet aid, but denied need.  -CARLOS       Motor Skills/Interventions    Additional Documentation   Balance Skills Training (Group)  -      Balance Skills Training    Sitting-Level of Assistance  Independent  - Independent  -      Sitting-Balance Support  Feet supported  -CARLOS Right upper extremity supported;Left upper extremity supported;Feet supported  -      Standing-Level of Assistance  Close supervision  - Contact guard  -      Static Standing Balance Support  assistive device  - assistive device  -      Gait Balance-Level of Assistance  Close supervision  - Contact guard  -      Gait Balance Support  assistive device  - assistive device  -      Therapy Exercises    Bilateral Lower Extremities   AROM:;5 reps;supine;ankle pumps/circles;glut sets;quad sets;hip IR;hip ER;heel slides;other reps   abd set, arms overhead, bent knee fallout  -      Bilateral Upper Extremity  --   Pt. demon. 2-3 reps back of spinal exer.  -CARLOS       Sensory Assessment/Intervention    Sensory Impairment   --   Pt denies n/t in BLEs.  -      Positioning and Restraints    Pre-Treatment Position  in bed  -CARLOS in bed  -      Post Treatment Position  bed  -CARLOS bed  -      In Bed  supine;call light within reach;encouraged to call for assist;with family/caregiver  - notified nsg;call light within reach;encouraged to call for assist;with family/caregiver;deborah  -        12/13/17 1554 12/13/17 1400 12/13/17 1200 12/13/17 1137 12/13/17 1020    General Information    Equipment Currently Used at Home    none  -MB     Living Environment    Lives With    spouse;child(dickson), dependent  -MB     Living Arrangements    house  -MB     Transportation Available    car;family or friend will provide  -MB     Functional Level Prior    Ambulation    0-->independent  -MB     Transferring    0-->independent  -MB     Toileting     0-->independent  -MB     Bathing    0-->independent  -MB     Dressing    0-->independent  -MB     Eating    0-->independent  -MB     Communication    0-->understands/communicates without difficulty  -MB     Swallowing    0-->swallows foods/liquids without difficulty  -MB     Muscle Tone Assessment    Bilateral Upper Extremities Muscle Tone Assessment associated movements noted  -SJ associated movements noted  -SJ associated movements noted  -SJ  associated movements noted  -SJ    Bilateral Lower Extremities Muscle Tone Assessment associated movements noted  -SJ associated movements noted  -SJ associated movements noted  -SJ  associated movements noted  -SJ      12/13/17 0730 12/12/17 1331 12/12/17 0704          General Information    Equipment Currently Used at Home   none  -MT      Living Environment    Lives With  spouse  -DT spouse;child(dickson), dependent  -MT      Living Arrangements  house  -DT house  -MT      Home Accessibility  bed and bath are not on the first floor  -DT bed and bath on same level;stairs within home  -MT      Number of Stairs to Enter Home  2  -DT       Number of Stairs Within Home  20  -DT       Stair Railings at Home  inside, present on left side;inside, present on right side  -DT inside, present on right side  -MT      Type of Financial/Environmental Concern  none  -DT none  -MT      Transportation Available  none;family or friend will provide  -DT car  -MT      Living Environment Comment  none  -DT       Functional Level Prior    Ambulation  0-->independent  -DT 0-->independent  -MT      Transferring  0-->independent  -DT 0-->independent  -MT      Toileting  0-->independent  -DT 0-->independent  -MT      Bathing  0-->independent  -DT 0-->independent  -MT      Dressing  0-->independent  -DT 0-->independent  -MT      Eating  0-->independent  -DT 0-->independent  -MT      Communication  0-->understands/communicates without difficulty  -DT 0-->understands/communicates without difficulty  -MT       Swallowing  0-->swallows foods/liquids without difficulty  -DT 0-->swallows foods/liquids without difficulty  -MT      Prior Functional Level Comment  none  -DT       Muscle Tone Assessment    Bilateral Upper Extremities Muscle Tone Assessment associated movements noted  -SJ        Bilateral Lower Extremities Muscle Tone Assessment associated movements noted  -SJ          User Key  (r) = Recorded By, (t) = Taken By, (c) = Cosigned By    Initials Name Effective Dates    CARLOS Haley Guillen Kwame, OT 06/22/15 -     MT Kendy Rodriguez, RN 06/16/16 -     MB Vi Chau, JOHNATHAN 10/26/17 -     LM Sabrian Alfred, PT 06/09/17 -     SJ Joanne Hi, RN 06/09/17 -     DT Ayanna Arias, JOHNATHAN 09/13/17 -           Occupational Therapy Education     Title: PT OT SLP Therapies (Done)     Topic: Occupational Therapy (Done)     Point: ADL training (Done)    Description: Instruct learner(s) on proper safety adaptation and remediation techniques during self care or transfers.   Instruct in proper use of assistive devices.    Learning Progress Summary    Learner Readiness Method Response Comment Documented by Status   Patient Acceptance E,D,HOLLY VANEGAS role OT, reason for consult, AE use for dressing and bathing to keep spinal prec., spinal prec. and body mechanics, UE ROM HEP.  12/14/17 1049 Done   Family Acceptance E,D,H HOLLY WHITE role OT, reason for consult, AE use for dressing and bathing to keep spinal prec., spinal prec. and body mechanics, UE ROM HEP.  12/14/17 1049 Done               Point: Home exercise program (Done)    Description: Instruct learner(s) on appropriate technique for monitoring, assisting and/or progressing therapeutic exercises/activities.    Learning Progress Summary    Learner Readiness Method Response Comment Documented by Status   Patient Acceptance E,D,H HOLLY WHITE role OT, reason for consult, AE use for dressing and bathing to keep spinal prec., spinal prec. and body mechanics, UE ROM HEP.  12/14/17 1049 Done    Family Acceptance E,D,H VU,DU role OT, reason for consult, AE use for dressing and bathing to keep spinal prec., spinal prec. and body mechanics, UE ROM HEP.  12/14/17 1049 Done               Point: Precautions (Done)    Description: Instruct learner(s) on prescribed precautions during self-care and functional transfers.    Learning Progress Summary    Learner Readiness Method Response Comment Documented by Status   Patient Acceptance E,D,H VU,DU role OT, reason for consult, AE use for dressing and bathing to keep spinal prec., spinal prec. and body mechanics, UE ROM HEP.  12/14/17 1049 Done   Family Acceptance E,D,H VU,DU role OT, reason for consult, AE use for dressing and bathing to keep spinal prec., spinal prec. and body mechanics, UE ROM HEP.  12/14/17 1049 Done               Point: Body mechanics (Done)    Description: Instruct learner(s) on proper positioning and spine alignment during self-care, functional mobility activities and/or exercises.    Learning Progress Summary    Learner Readiness Method Response Comment Documented by Status   Patient Acceptance E,D,H VU,DU role OT, reason for consult, AE use for dressing and bathing to keep spinal prec., spinal prec. and body mechanics, UE ROM HEP.  12/14/17 1049 Done   Family Acceptance E,D,H VU,DU role OT, reason for consult, AE use for dressing and bathing to keep spinal prec., spinal prec. and body mechanics, UE ROM HEP.  12/14/17 1049 Done                      User Key     Initials Effective Dates Name Provider Type Discipline     06/22/15 -  Haley Puente OT Occupational Therapist OT                OT Recommendation and Plan  Anticipated Equipment Needs At Discharge:  (reacher, shoe horn, bath sponge issued, need order sockaid)  Anticipated Discharge Disposition: home with assist  Therapy Frequency: evaluation only  Plan of Care Review  Plan Of Care Reviewed With: patient  Progress: progress toward functional goals as expected  Outcome  Summary/Follow up Plan: OT eval. completed.  Post education pt. demonstated log roll in and out of bed with supervision, sit to stand and toilet transfer S, and abilty to dress self with AE maintaining spinal prec. post education.  NO further skilled OT services needed.  Plans home with assist.           OT Goals       12/14/17 1051          Bed Mobility OT LTG    Bed Mobility OT LTG, Date Established 12/14/17  -CARLOS      Bed Mobility OT LTG, Time to Achieve 1 day  -CARLOS      Bed Mobility OT LTG, Activity Type all bed mobility   log roll  -CARLOS      Bed Mobility OT LTG, [place] Level supervision required  -CARLOS      Bed Mobility OT LTG, Outcome goal met  -CARLOS      Transfer Training OT LTG    Transfer Training OT LTG, Date Established 12/14/17  -CARLOS      Transfer Training OT LTG, Time to Achieve 1 day  -CARLOS      Transfer Training OT LTG, Activity Type sit to stand/stand to sit;toilet  -CARLOS      Transfer Training OT LTG, [place] Level supervision required  -CARLOS      Transfer Training OT LTG, Assist Device walker, rolling   ETS  -CARLOS      Transfer Training OT LTG, Outcome goal met  -CARLOS      Patient Education OT LTG    Patient Education OT LTG, Date Established 12/14/17  -CARLOS      Patient Education OT LTG, Time to Achieve 1 day  -CARLOS      Patient Education OT LTG, Education Type written program;HEP;precautions per surgeon;home safety;adaptive equipment mgmt  -CARLOS      Patient Education OT LTG, Education Understanding demonstrates adequately;verbalizes understanding  -CARLOS      Patient Education OT LTG Outcome goal met  -CARLOS      LB Dressing OT LTG    LB Dressing Goal OT LTG, Date Established 12/14/17  -CARLOS      LB Dressing Goal OT LTG, Time to Achieve 1 day  -CARLOS      LB Dressing Goal OT LTG, Activity Type donning socks and shoes.  -CARLOS      LB Dressing Goal OT LTG, [place] Level supervision required;verbal cues required  -CARLOS      LB Dressing Goal OT LTG, Adaptive Equipment sock-aid;reacher;shoe horn, long handled  -CARLOS      LB  Dressing Goal OT LTG, Outcome goal met  -CARLOS        User Key  (r) = Recorded By, (t) = Taken By, (c) = Cosigned By    Initials Name Provider Type    CARLOS Puente OT Occupational Therapist                Outcome Measures       12/14/17 1000 12/14/17 0856       How much help from another person do you currently need...    Turning from your back to your side while in flat bed without using bedrails?  3  -LM     Moving from lying on back to sitting on the side of a flat bed without bedrails?  3  -LM     Moving to and from a bed to a chair (including a wheelchair)?  3  -LM     Standing up from a chair using your arms (e.g., wheelchair, bedside chair)?  3  -LM     Climbing 3-5 steps with a railing?  3  -LM     To walk in hospital room?  3  -LM     AM-PAC 6 Clicks Score  18  -LM     How much help from another is currently needed...    Putting on and taking off regular lower body clothing? 3   set up with AE  -CARLOS      Bathing (including washing, rinsing, and drying) 3   set up with AE  -CARLOS      Toileting (which includes using toilet bed pan or urinal) 4  -CARLOS      Putting on and taking off regular upper body clothing 4  -CARLOS      Taking care of personal grooming (such as brushing teeth) 4  -CARLOS      Eating meals 4  -CARLOS      Score 22  -CARLOS      Functional Assessment    Outcome Measure Options AM-PAC 6 Clicks Daily Activity (OT)  -CARLOS AM-PAC 6 Clicks Basic Mobility (PT)  -LM       User Key  (r) = Recorded By, (t) = Taken By, (c) = Cosigned By    Initials Name Provider Type    CARLOS Puente OT Occupational Therapist    SUNDAY Alfred, PT Physical Therapist          Time Calculation:         Time Calculation- OT       12/14/17 1055          Time Calculation- OT    OT Start Time 1000  -CARLOS      Total Timed Code Minutes- OT 25 minute(s)  -CARLOS      OT Received On 12/14/17  -CARLOS        User Key  (r) = Recorded By, (t) = Taken By, (c) = Cosigned By    Initials Name Provider Type    CARLOS Puente OT Occupational Therapist           Therapy Charges for Today     Code Description Service Date Service Provider Modifiers Qty    [id number]  OT EVAL LOW COMPLEXITY 2 12/14/2017 Haley Puente OT GO 1    [id number]  OT THERAPEUTIC ACT EA 15 MIN 12/14/2017 Haley Puente OT GO 2               OT Discharge Summary  Anticipated Discharge Disposition: home with assist  Reason for Discharge: All goals achieved  Outcomes Achieved: Able to achieve all goals within established timeline  Discharge Destination: Home with assist    Haley Puente OT  12/14/2017

## 2017-12-15 PROCEDURE — 25010000002 ENOXAPARIN PER 10 MG: Performed by: NEUROLOGICAL SURGERY

## 2017-12-15 RX ORDER — LIDOCAINE HYDROCHLORIDE AND EPINEPHRINE 10; 10 MG/ML; UG/ML
10 INJECTION, SOLUTION INFILTRATION; PERINEURAL ONCE
Status: COMPLETED | OUTPATIENT
Start: 2017-12-15 | End: 2017-12-15

## 2017-12-15 RX ADMIN — OXYCODONE HYDROCHLORIDE AND ACETAMINOPHEN 1 TABLET: 7.5; 325 TABLET ORAL at 13:09

## 2017-12-15 RX ADMIN — PREGABALIN 75 MG: 75 CAPSULE ORAL at 08:51

## 2017-12-15 RX ADMIN — DIAZEPAM 5 MG: 5 TABLET ORAL at 08:51

## 2017-12-15 RX ADMIN — OXYCODONE HYDROCHLORIDE AND ACETAMINOPHEN 1 TABLET: 7.5; 325 TABLET ORAL at 08:51

## 2017-12-15 RX ADMIN — DIAZEPAM 5 MG: 5 TABLET ORAL at 04:26

## 2017-12-15 RX ADMIN — OXYCODONE HYDROCHLORIDE AND ACETAMINOPHEN 1 TABLET: 7.5; 325 TABLET ORAL at 00:59

## 2017-12-15 RX ADMIN — PREGABALIN 75 MG: 75 CAPSULE ORAL at 21:33

## 2017-12-15 RX ADMIN — BISACODYL 10 MG: 10 SUPPOSITORY RECTAL at 09:51

## 2017-12-15 RX ADMIN — OXYCODONE HYDROCHLORIDE AND ACETAMINOPHEN 1 TABLET: 7.5; 325 TABLET ORAL at 19:27

## 2017-12-15 RX ADMIN — ENOXAPARIN SODIUM 40 MG: 40 INJECTION SUBCUTANEOUS at 08:51

## 2017-12-15 RX ADMIN — LIDOCAINE HYDROCHLORIDE,EPINEPHRINE BITARTRATE 10 ML: 10; .01 INJECTION, SOLUTION INFILTRATION; PERINEURAL at 09:51

## 2017-12-15 RX ADMIN — DIAZEPAM 5 MG: 5 TABLET ORAL at 21:33

## 2017-12-15 RX ADMIN — DIAZEPAM 5 MG: 5 TABLET ORAL at 15:34

## 2017-12-15 RX ADMIN — OXYCODONE HYDROCHLORIDE AND ACETAMINOPHEN 1 TABLET: 7.5; 325 TABLET ORAL at 04:26

## 2017-12-15 NOTE — PROGRESS NOTES
Continued Stay Note   [facility]     Patient Name: Alvin Gordon  MRN: [id number]  Today's Date: 12/15/2017    Admit Date: 12/12/2017          Discharge Plan       12/15/17 0930    Case Management/Social Work Plan    Plan Home    Patient/Family In Agreement With Plan yes    Additional Comments Spoke with patient at bedside. Plan remains to return home with the assist of his wife. CM will follow.               Discharge Codes     None            Vi Chau RN

## 2017-12-15 NOTE — PLAN OF CARE
Problem: Patient Care Overview (Adult)  Goal: Plan of Care Review  Outcome: Ongoing (interventions implemented as appropriate)    12/15/17 0405   Coping/Psychosocial Response Interventions   Plan Of Care Reviewed With patient   Patient Care Overview   Progress improving   Outcome Evaluation   Outcome Summary/Follow up Plan Dressing clean,dry and intact. Lower back pain reported and relieved by PRN pain meds. VSS. plan d/c today. Continue to monitor.          Problem: Perioperative Period (Adult)  Goal: Signs and Symptoms of Listed Potential Problems Will be Absent or Manageable (Perioperative Period)  Outcome: Ongoing (interventions implemented as appropriate)    12/15/17 0405   Perioperative Period   Problems Assessed (Perioperative Period) all   Problems Present (Perioperative Period) pain         Problem: Laminectomy/Foraminotomy/Discectomy (Adult)  Goal: Signs and Symptoms of Listed Potential Problems Will be Absent or Manageable (Laminectomy/Foraminotomy/Discectomy)  Outcome: Ongoing (interventions implemented as appropriate)    12/15/17 0405   Laminectomy/Foraminotomy/Discectomy   Problems Assessed (Laminectomy/Laminotomy/Discectomy) all   Problems Present (Laminectomy/Laminotomy/Discectomy) pain

## 2017-12-15 NOTE — PAYOR COMM NOTE
"Updated clinicals for continued hospitalization post surgery  auth #[id number]  Last day covered was 12/12, updated clinicals have been faxed daily on 12/13, 12/14 and today 12/15     Nicole Jean RN  Utilization Review  [phone]  Fax [phone]  Alvin Gordon (37 y.o. Male)     Date of Birth Social Security Number Address Home Phone MRN    1980  [address]  [facility] [address] [phone] [id number]    Alevism Marital Status          Religious        Admission Date Admission Type Admitting Provider Attending Provider Department, Room/Bed    12/12/17 Elective Kevin Jacinto MD Newman, eKvin Pickens MD [address], S374/1    Discharge Date Discharge Disposition Discharge Destination                      Attending Provider: Kevin Jacinto MD     Allergies:  No Known Allergies    Isolation:  None   Infection:  None   Code Status:  FULL    Ht:  167.6 cm (66\")   Wt:  93 kg (205 lb)    Admission Cmt:  None   Principal Problem:  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                 Active Insurance as of 12/12/2017     Primary Coverage     Payor Plan Insurance Group Employer/Plan Group    [employer] BLUE [employer]OSS [employer] BLUE [employer]OSS BLUE [employer] PPO [id number]     Payor Plan Address Payor Plan Phone Number Effective From Effective To    [address] [phone] 1/1/2008     [place] [id number]       Subscriber Name Subscriber Birth Date Member ID       ALVIN GORDON 1980 [id number]                 Emergency Contacts      (Rel.) Home Phone Work Phone Mobile Phone    Robyn Gordon (Mother) [phone] -- --    MayLyric (Spouse) [phone] -- --            Vital Signs (last 72 hrs)       12/12 0700  -  12/13 0659 12/13 0700  -  12/14 0659 12/14 0700  -  12/15 0659 12/15 0700  -  12/15 1554   Most Recent    Temp (°F) 97.6 -  98.3    97.4 -  99.3    97.2 -  98.4       98.4 (36.9)    Heart Rate 76 -  " "(!)128    79 -  96    87 -  95      92     92    Resp 16 -  18      18    16 -  18      16     16    /87 -  130/77    112/65 -  129/80    123/77 -  151/79      116/66     116/66    SpO2 (%) 90 -  97    92 -  99    92 -  97      95     95          Intake & Output (last 3 days)       12/12 0701 - 12/13 0700 12/13 0701 - 12/14 0700 12/14 0701 - 12/15 0700 12/15 0701 - 12/16 0700    P.O. 320 940  240    I.V. (mL/kg) 1000 (10.8)       Total Intake(mL/kg) 1320 (14.2) 940 (10.1)  240 (2.6)    Urine (mL/kg/hr) 1400 (0.6) 1400 (0.6)      Other 150 (0.1) 220 (0.1) 80 (0)     Blood 200 (0.1)       Total Output 1750 1620 80      Net -430 -680 -80 +240            Unmeasured Urine Occurrence  2 x 1 x     Unmeasured Stool Occurrence    1 x        Lines, Drains & Airways    Active LDAs     None                Hospital Medications (active)       Dose Frequency Start End    bisacodyl (DULCOLAX) suppository 10 mg 10 mg Daily PRN 12/12/2017     Sig - Route: Insert 1 suppository into the rectum Daily As Needed for Constipation. - Rectal    diazePAM (VALIUM) tablet 5 mg 5 mg Every 6 Hours 12/14/2017 12/24/2017    Sig - Route: Take 1 tablet by mouth Every 6 (Six) Hours. - Oral    docusate sodium (COLACE) capsule 100 mg 100 mg 2 Times Daily PRN 12/12/2017     Sig - Route: Take 1 capsule by mouth 2 (Two) Times a Day As Needed for Constipation. - Oral    enoxaparin (LOVENOX) syringe 40 mg 40 mg Daily 12/14/2017     Sig - Route: Inject 0.4 mL under the skin Daily. - Subcutaneous    lidocaine-EPINEPHrine (XYLOCAINE W/EPI) 1 %-1:959363 injection 10 mL 10 mL Once 12/15/2017 12/15/2017    Sig - Route: 10 mL by Infiltration route 1 (One) Time. - Infiltration    ondansetron (ZOFRAN) injection 4 mg 4 mg Every 6 Hours PRN 12/12/2017     Sig - Route: Infuse 2 mL into a venous catheter Every 6 (Six) Hours As Needed for Nausea or Vomiting. - Intravenous    Linked Group 1:  \"Or\" Linked Group Details        ondansetron (ZOFRAN) tablet 4 mg 4 mg " "Every 6 Hours PRN 12/12/2017     Sig - Route: Take 1 tablet by mouth Every 6 (Six) Hours As Needed for Nausea or Vomiting. - Oral    Linked Group 1:  \"Or\" Linked Group Details        oxyCODONE (oxyCONTIN) 12 hr tablet 10 mg 10 mg Every 8 Hours PRN 12/12/2017 12/22/2017    Sig - Route: Take 1 tablet by mouth Every 8 (Eight) Hours As Needed for Moderate Pain . - Oral    oxyCODONE-acetaminophen (PERCOCET) 7.5-325 MG per tablet 1 tablet 1 tablet Every 4 Hours PRN 12/14/2017 12/24/2017    Sig - Route: Take 1 tablet by mouth Every 4 (Four) Hours As Needed for Moderate Pain  or Severe Pain . - Oral    polyethylene glycol 3350 powder (packet) 17 g Daily PRN 12/12/2017     Sig - Route: Take 17 g by mouth Daily As Needed for Constipation. - Oral    pregabalin (LYRICA) capsule 75 mg 75 mg Every 12 Hours Scheduled 12/12/2017     Sig - Route: Take 1 capsule by mouth Every 12 (Twelve) Hours. - Oral    sennosides-docusate sodium (SENOKOT-S) 8.6-50 MG tablet 1 tablet 1 tablet Nightly PRN 12/12/2017     Sig - Route: Take 1 tablet by mouth At Night As Needed for Constipation. - Oral    lactated ringers infusion (Discontinued) 9 mL/hr Continuous 12/12/2017 12/15/2017    Sig - Route: Infuse 9 mL/hr into a venous catheter Continuous. - Intravenous    sodium chloride 0.9 % flush 1-10 mL (Discontinued) 1-10 mL As Needed 12/12/2017 12/15/2017    Sig - Route: Infuse 1-10 mL into a venous catheter As Needed for Line Care. 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       Impression:       Intraoperative fluoroscopy utilized during posterior lumbar  interbody fusion L5-S1.     D:  12/12/2017  E:  12/12/2017         This report was finalized on 12/12/2017 4:12 PM by Dr. Neftaly Pratt.       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       Impression:       Intraoperative fluoroscopy utilized during posterior lumbar  interbody fusion L5-S1.     D:  12/12/2017  E:  12/12/2017         This report was finalized on 12/12/2017 4:12 PM by Dr. Neftaly Pratt.       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      TECHNIQUE: Intraoperative fluoroscopy utilized for improved localization  and treatment planning.     COMPARISON: NONE.     FINDINGS: Intraoperative fluoroscopy with total fluoroscopic time usage  2 seconds and 2 representative images saved of localization and  intraoperative probe at the L5-S1 level.       Impression:       Intraoperative fluoroscopy utilized for PLIF L5-S1 planning.     D:   "2017  E:  2017            This report was finalized on 2017 4:12 PM by Dr. Neftaly Pratt.             ECG/EMG Results (last 72 hours)     ** No results found for the last 72 hours. **           Physician Progress Notes (last 72 hours) (Notes from 2017  3:55 PM through 12/15/2017  3:55 PM)      Kevin Jacinto MD at 2017  9:07 AM  Version 1 of 1         Subjective: No events overnight; resting comfortably    Objective:    Vitals:    17 0814   BP: 117/64   Pulse: 94   Resp:    Temp: 97.4 °F (36.3 °C)   SpO2: 99%     Pulse  Av.8  Min: 94  Max: 128  Systolic (24hrs), Av , Min:100 , Max:130     Diastolic (24hrs), Av, Min:56, Max:93    Temp (24hrs), Av.9 °F (36.6 °C), Min:97.4 °F (36.3 °C), Max:98.2 °F (36.8 °C)      Incision c/d/i  Denies leg pain  Full motor strength BLE proximally & distally    Lab Results   Component Value Date     12/10/2017       A/P:   Ambulate today at around noon  PT/OT  Continue PING drain  Pain control  Anticipate d/c tomorrow or Friday       Electronically signed by Kevin Jacitno MD at 2017  9:09 AM      Kyra Nogueira PA-C at 2017  7:59 AM  Version 1 of 1    Attestation signed by Kevin Jacinto MD at 2017 11:57 AM        I have reviewed the documentation above and agree.                                 NEUROSURGERY PROGRESS NOTE    Interval History:   Mr. Gordon is doing well. He complains of incisional pain, but not of leg pain. He has been up ambulating and his incision remains clean and dry. He noted a moderate headache yesterday, but thinks it may be due in part to less caffeine intake than he is used to.     Vital Signs  Blood pressure 123/77, pulse 87, temperature 97.2 °F (36.2 °C), temperature source Tympanic, resp. rate 18, height 167.6 cm (66\"), weight 93 kg (205 lb), SpO2 92 %.    Physical Exam:  Patient awake, alert, and oriented.  LE strength and sensation are intact and " equal bilaterally. PING drain has 10-15ml of serosanguinous drainage.  Incision is clean and dry.      Results Review:      Results from last 7 days  Lab Units 12/10/17  0957   WBC 10*3/mm3 7.27   HEMOGLOBIN g/dL 16.6   HEMATOCRIT % 46.3   PLATELETS 10*3/mm3 209       I/O last 3 completed shifts:  In: 940 [P.O.:940]  Out: 2770 [Urine:2450; Other:320]  I/O this shift:  In: -   Out: 60 [Other:60]      ASSESSMENT:/PLAN:   PING out this morning  Ambulate with PT, work with OT  Add scheduled valium to pain meds  Possibly home this afternoon if patient is doing well.     Kyra Nogueira PA-C  12/14/17  7:59 AM     Electronically signed by Kevin Jacinto MD at 12/14/2017 11:57 AM        Consult Notes (last 72 hours) (Notes from 12/12/2017  3:55 PM through 12/15/2017  3:55 PM)     No notes of this type exist for this encounter.

## 2017-12-16 VITALS
TEMPERATURE: 98.3 F | HEIGHT: 66 IN | SYSTOLIC BLOOD PRESSURE: 135 MMHG | RESPIRATION RATE: 16 BRPM | BODY MASS INDEX: 32.95 KG/M2 | OXYGEN SATURATION: 94 % | WEIGHT: 205 LBS | HEART RATE: 89 BPM | DIASTOLIC BLOOD PRESSURE: 89 MMHG

## 2017-12-16 PROBLEM — M43.06 PARS DEFECT OF LUMBAR SPINE: Status: ACTIVE | Noted: 2017-11-21

## 2017-12-16 PROCEDURE — 25010000002 ENOXAPARIN PER 10 MG: Performed by: NEUROLOGICAL SURGERY

## 2017-12-16 RX ORDER — DIAZEPAM 5 MG/1
5 TABLET ORAL EVERY 6 HOURS
Qty: 31 TABLET | Refills: 0 | Status: SHIPPED | OUTPATIENT
Start: 2017-12-16 | End: 2017-12-24

## 2017-12-16 RX ORDER — OXYCODONE HCL 10 MG/1
10 TABLET, FILM COATED, EXTENDED RELEASE ORAL EVERY 8 HOURS PRN
Qty: 30 TABLET | Refills: 0 | Status: SHIPPED | OUTPATIENT
Start: 2017-12-16 | End: 2018-05-04

## 2017-12-16 RX ORDER — PREGABALIN 75 MG/1
75 CAPSULE ORAL EVERY 12 HOURS SCHEDULED
Qty: 60 CAPSULE | Refills: 0 | Status: SHIPPED | OUTPATIENT
Start: 2017-12-16 | End: 2018-05-04

## 2017-12-16 RX ORDER — OXYCODONE AND ACETAMINOPHEN 7.5; 325 MG/1; MG/1
1 TABLET ORAL EVERY 4 HOURS PRN
Qty: 60 TABLET | Refills: 0 | Status: SHIPPED | OUTPATIENT
Start: 2017-12-16 | End: 2017-12-24

## 2017-12-16 RX ORDER — PSEUDOEPHEDRINE HCL 30 MG
100 TABLET ORAL 2 TIMES DAILY PRN
Qty: 30 CAPSULE | Refills: 2 | Status: SHIPPED | OUTPATIENT
Start: 2017-12-16 | End: 2018-05-04

## 2017-12-16 RX ADMIN — DIAZEPAM 5 MG: 5 TABLET ORAL at 08:32

## 2017-12-16 RX ADMIN — OXYCODONE HYDROCHLORIDE AND ACETAMINOPHEN 1 TABLET: 7.5; 325 TABLET ORAL at 13:51

## 2017-12-16 RX ADMIN — DIAZEPAM 5 MG: 5 TABLET ORAL at 03:53

## 2017-12-16 RX ADMIN — OXYCODONE HYDROCHLORIDE AND ACETAMINOPHEN 1 TABLET: 7.5; 325 TABLET ORAL at 00:21

## 2017-12-16 RX ADMIN — PREGABALIN 75 MG: 75 CAPSULE ORAL at 08:32

## 2017-12-16 RX ADMIN — OXYCODONE HYDROCHLORIDE AND ACETAMINOPHEN 1 TABLET: 7.5; 325 TABLET ORAL at 08:32

## 2017-12-16 RX ADMIN — ENOXAPARIN SODIUM 40 MG: 40 INJECTION SUBCUTANEOUS at 08:32

## 2017-12-16 RX ADMIN — DIAZEPAM 5 MG: 5 TABLET ORAL at 14:54

## 2017-12-16 RX ADMIN — OXYCODONE HYDROCHLORIDE AND ACETAMINOPHEN 1 TABLET: 7.5; 325 TABLET ORAL at 03:53

## 2017-12-16 NOTE — PROGRESS NOTES
Continued Stay Note  [facility]     Patient Name: Alvin Gordon  MRN: [id number]  Today's Date: 12/16/2017    Admit Date: 12/12/2017          Discharge Plan       12/16/17 1121    Case Management/Social Work Plan    Additional Comments Rolling walker has been ordered from [facility] to be delievered to his room. No further discharge needs.    Final Note    Final Note home              Discharge Codes       12/16/17 1122    Discharge Codes    Discharge Codes 01  Discharge to home        Expected Discharge Date and Time     Expected Discharge Date Expected Discharge Time    Dec 16, 2017             Pam Cardona RN

## 2017-12-16 NOTE — PLAN OF CARE
Problem: Patient Care Overview (Adult)  Goal: Plan of Care Review  Outcome: Ongoing (interventions implemented as appropriate)    12/15/17 2133 12/16/17 0321   Coping/Psychosocial Response Interventions   Plan Of Care Reviewed With patient --    Patient Care Overview   Progress --  improving   Outcome Evaluation   Outcome Summary/Follow up Plan --  Patient rested well this shift. Pain controlled with PRN medications. See MAR.

## 2017-12-16 NOTE — DISCHARGE PLACEMENT REQUEST
"Alvin Gordon (37 y.o. Male)     Pam Cardona RN  [phone]    Pt is in room [address] [place] [address] and is leaving today      Date of Birth Social Security Number Address Home Phone MRN    1980  [address]  [facility] [id number] [phone] [id number]    Hindu Marital Status          Yazidism        Admission Date Admission Type Admitting Provider Attending Provider Department, Room/Bed    12/12/17 Elective Kevin Jacinto MD Newman, Charles Benjamin, MD [address], S374/1    Discharge Date Discharge Disposition Discharge Destination         Home or Self Care             Attending Provider: Kevin Jacinto MD     Allergies:  No Known Allergies    Isolation:  None   Infection:  None   Code Status:  FULL    Ht:  167.6 cm (66\")   Wt:  93 kg (205 lb)    Admission Cmt:  None   Principal Problem:  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                 Active Insurance as of 12/12/2017     Primary Coverage     Payor Plan Insurance Group Employer/Plan Group    [facility] BLUE [facility] [employer] [id number]     Payor Plan Address Payor Plan Phone Number Effective From Effective To    [address] [phone] 1/1/2008     [place] [address]       Subscriber Name Subscriber Birth Date Member ID       ALVIN GORDON 1980 [id number]                 Emergency Contacts      (Rel.) Home Phone Work Phone Mobile Phone    Robyn Gordon (Mother) [phone] -- --    MayLyric (Spouse) [phone] -- --        William [] (Order [id number])   General Supply    Date: 12/16/2017   Department: [address]   Ordering/Authorizing: Kevin Jacinto MD           Order History  Outpatient       Date/Time Action Taken User Additional Information       12/16/17 1119 Sign Pam Cardona RN Ordering Mode: Verbal with readback           Order Details        Frequency Duration Priority Order " Class       None None Routine Hospital Performed           Start Date/Time        Start Date       12/16/17           Order Questions        Question Answer Comment       Equipment  Walker Folding with Wheels        Length of Need (99 Months = Lifetime) 15 Months        Note:  Custom values to enter length of need for DME           Verbal Order Info        Action Created on Order Mode Entered by Responsible Provider Signed by Signed on       Ordering 12/16/17 1119 Verbal with readback Pam Cardona RN Kevin Jacinto MD             Source Order Set / Preference List        Preference List       AMB SUPPLIES [1416]           Clinical Indications           ICD-10-CM ICD-9-CM       Impaired functional mobility, balance, gait, and endurance  - Primary     Z74.09 V49.89       Pars defect of lumbar spine     M43.06 738.4       Impaired mobility and ADLs     Z74.09 799.89           Reprint Order Requisition        WALKER (Order #[id number]) on 12/16/17         Encounter-Level Cardiology Documents:        There are no encounter-level cardiology documents.         Encounter        View Encounter         Order Provider Info            Office phone Pager E-mail       Ordering User Pam Cardona RN -- -- --       Authorizing Provider Kevin Jacinto [phone] -- --       Attending Provider Kevin Jacinto [phone] -- --       Entered By Pam Cardona RN -- -- --       Ordering Provider Kevin Jacinto [phone] -- --           Linked Charges        No charges linked to this procedure         Order Transmittal Tracking        WALKER (Order #[id number]) on 12/16/17         Authorized by:  Kevin Jacinto MD  (NPI: [id number])                 Insurance Information                [employer] [facility] BLUE CROSS/[employer] BLUE CROSS BLUE [facility] PPO Phone: [phone]    Subscriber: Alvin Gordon Subscriber#: [id number]    Group#: [id number] Precert#:               History & Physical      Kevin Jacinto MD at 11/20/2017  3:30 PM          Subjective     Chief Complaint: Low back pain    Patient ID: Alvin Gordon is a 37 y.o. male seen for consultation today at the request of  Sanjay New MD    Back Pain   This is a chronic problem. The current episode started more than 1 year ago. The problem occurs constantly. The problem has been gradually worsening since onset. The pain is present in the lumbar spine and sacro-iliac. The quality of the pain is described as aching. The pain radiates to the right foot and left foot. The pain is at a severity of 8/10. The pain is severe. The pain is the same all the time. The symptoms are aggravated by lying down, bending, position, standing and twisting. Stiffness is present all day. Associated symptoms include headaches and leg pain. Pertinent negatives include no abdominal pain, bladder incontinence, bowel incontinence, chest pain, dysuria, fever, numbness, paresis, paresthesias, pelvic pain, perianal numbness, tingling, weakness or weight loss. He has tried analgesics, chiropractic manipulation, heat and NSAIDs for the symptoms. The treatment provided no relief.       This is a 37-year-old man who presents to my clinic with a long-standing history of progressive low back pain.  He has tried home exercises and anti-inflammatories.  These have been ineffective in alleviating his back pain.  He is recently reporting some leg pain that has become progressive.  His pain is affecting his ability to work and sleep.  He reports that his quality of life is diminished as a result of his refractory, chronic low back pain.    The following portions of the patient's history were reviewed and updated as appropriate: allergies, current medications, past family history, past medical history, past social history, past surgical history and problem list.    Family history:   Family History   Problem Relation Age of Onset   • No Known  Problems Mother    • No Known Problems Father        Social history:   Social History     Social History   • Marital status:      Spouse name: N/A   • Number of children: N/A   • Years of education: N/A     Occupational History   • Not on file.     Social History Main Topics   • Smoking status: Never Smoker   • Smokeless tobacco: Never Used   • Alcohol use Yes      Comment: occasionally   • Drug use: No   • Sexual activity: Not on file     Other Topics Concern   • Not on file     Social History Narrative       Review of Systems   Constitutional: Negative for activity change, appetite change, chills, diaphoresis, fatigue, fever, unexpected weight change and weight loss.   HENT: Negative for congestion, dental problem, drooling, ear discharge, ear pain, facial swelling, hearing loss, mouth sores, nosebleeds, postnasal drip, rhinorrhea, sinus pressure, sneezing, sore throat, tinnitus, trouble swallowing and voice change.    Eyes: Negative for photophobia, pain, discharge, redness, itching and visual disturbance.   Respiratory: Negative for apnea, cough, choking, chest tightness, shortness of breath, wheezing and stridor.    Cardiovascular: Negative for chest pain, palpitations and leg swelling.   Gastrointestinal: Negative for abdominal distention, abdominal pain, anal bleeding, blood in stool, bowel incontinence, constipation, diarrhea, nausea, rectal pain and vomiting.   Endocrine: Negative for cold intolerance, heat intolerance, polydipsia, polyphagia and polyuria.   Genitourinary: Negative for bladder incontinence, decreased urine volume, difficulty urinating, dysuria, enuresis, flank pain, frequency, genital sores, hematuria, pelvic pain and urgency.   Musculoskeletal: Positive for arthralgias, back pain and myalgias. Negative for gait problem, joint swelling, neck pain and neck stiffness.   Skin: Negative for color change, pallor, rash and wound.   Allergic/Immunologic: Negative for environmental  "allergies, food allergies and immunocompromised state.   Neurological: Positive for headaches. Negative for dizziness, tingling, tremors, seizures, syncope, facial asymmetry, speech difficulty, weakness, light-headedness, numbness and paresthesias.   Hematological: Negative for adenopathy. Does not bruise/bleed easily.   Psychiatric/Behavioral: Negative for agitation, behavioral problems, confusion, decreased concentration, dysphoric mood, hallucinations, self-injury, sleep disturbance and suicidal ideas. The patient is not nervous/anxious and is not hyperactive.    All other systems reviewed and are negative.      Objective   Blood pressure 100/60, temperature 97.9 °F (36.6 °C), height 66\" (167.6 cm), weight 211 lb (95.7 kg).  Body mass index is 34.06 kg/(m^2).    Physical Exam   Constitutional: He is oriented to person, place, and time. He appears well-developed.  Non-toxic appearance.   HENT:   Head: Normocephalic and atraumatic.   Right Ear: Hearing normal.   Left Ear: Hearing normal.   Nose: Nose normal.   Eyes: Conjunctivae, EOM and lids are normal. Pupils are equal, round, and reactive to light.   Neck: Normal range of motion. No JVD present.   Cardiovascular: Normal rate and regular rhythm.    Pulses:       Radial pulses are 2+ on the right side, and 2+ on the left side.   Pulmonary/Chest: Effort normal. No stridor. No respiratory distress. He has no wheezes.   Musculoskeletal:        Thoracic back: He exhibits no tenderness, no swelling, no pain and no spasm.        Lumbar back: He exhibits decreased range of motion, tenderness and pain. He exhibits no bony tenderness, no swelling and no spasm.   Muscle Group    L          R  Hip Flexor          5          5  Knee Extensor  5          5  ADF                   5          5  APF                   5          5  EHL                   5          5   Neurological: He is alert and oriented to person, place, and time. He has normal strength. He displays normal " reflexes. No cranial nerve deficit or sensory deficit. He exhibits normal muscle tone. He displays a negative Romberg sign. Coordination and gait normal. GCS eye subscore is 4. GCS verbal subscore is 5. GCS motor subscore is 6.   Reflex Scores:       Tricep reflexes are 2+ on the right side and 2+ on the left side.       Bicep reflexes are 2+ on the right side and 2+ on the left side.       Brachioradialis reflexes are 2+ on the right side and 2+ on the left side.       Patellar reflexes are 2+ on the right side and 2+ on the left side.       Achilles reflexes are 1+ on the right side and 1+ on the left side.  Skin: Skin is warm and dry. No rash noted. No erythema.   Psychiatric: He has a normal mood and affect. His behavior is normal. Judgment and thought content normal.   Nursing note and vitals reviewed.        Assessment/Plan     Independent Review of Radiographic Studies:      Available for my review is a MRI of the lumbar spine performed on 10/23/17.  This discloses a grade 2 spondylolisthesis of L5 on S1.  The spondylolisthesis measures approximately 13 mm.  There are bilateral pars defects at L5-S1.  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  There is severe neural foraminal stenosis at L5-S1 bilaterally.    Medical Decision Making:      This is a 37-year-old man with a grade 2 isthmic spondylolisthesis.  He is a candidate for an L5-S1 fusion.  He is interested in surgery.  He has failed conservative treatment area did    Informed consent for an L5-S1 PLIF was obtained from the patient.  He acknowledges risk of nerve root injury, paralysis, weakness, loss of sensation, permanent neurologic deficit, bleeding, infection, spinal fluid leak, iatrogenic instability, failure of benefit of the operation, or need for additional procedures.  All questions were answered.  No guarantees are given or implied.  The patient elects proceed with surgery.    We will schedule him for an  L5-S1 PLIF on an elective basis in the near future.    Alvin was seen today for back pain and leg pain.    Diagnoses and all orders for this visit:    Pars defect of lumbar spine  -     CT Lumbar Spine Without Contrast; Future    Spondylolisthesis at L5-S1 level  -     CT Lumbar Spine Without Contrast; Future      No Follow-up on file.           This document signed by SHEN Jacinto MD November 20, 2017 4:36 PM           Electronically signed by Kevin Jacinto MD at 11/20/2017  4:37 PM      Kevin Jacinto MD at 11/20/2017  3:30 PM          Subjective     Chief Complaint: Low back pain    Patient ID: Alvin Gordon is a 37 y.o. male seen for consultation today at the request of  Sanjay New MD    Back Pain   This is a chronic problem. The current episode started more than 1 year ago. The problem occurs constantly. The problem has been gradually worsening since onset. The pain is present in the lumbar spine and sacro-iliac. The quality of the pain is described as aching. The pain radiates to the right foot and left foot. The pain is at a severity of 8/10. The pain is severe. The pain is the same all the time. The symptoms are aggravated by lying down, bending, position, standing and twisting. Stiffness is present all day. Associated symptoms include headaches and leg pain. Pertinent negatives include no abdominal pain, bladder incontinence, bowel incontinence, chest pain, dysuria, fever, numbness, paresis, paresthesias, pelvic pain, perianal numbness, tingling, weakness or weight loss. He has tried analgesics, chiropractic manipulation, heat and NSAIDs for the symptoms. The treatment provided no relief.       This is a 37-year-old man who presents to my clinic with a long-standing history of progressive low back pain.  He has tried home exercises and anti-inflammatories.  These have been ineffective in alleviating his back pain.  He is recently reporting some leg pain that has become  progressive.  His pain is affecting his ability to work and sleep.  He reports that his quality of life is diminished as a result of his refractory, chronic low back pain.    The following portions of the patient's history were reviewed and updated as appropriate: allergies, current medications, past family history, past medical history, past social history, past surgical history and problem list.    Family history:   Family History   Problem Relation Age of Onset   • No Known Problems Mother    • No Known Problems Father        Social history:   Social History     Social History   • Marital status:      Spouse name: N/A   • Number of children: N/A   • Years of education: N/A     Occupational History   • Not on file.     Social History Main Topics   • Smoking status: Never Smoker   • Smokeless tobacco: Never Used   • Alcohol use Yes      Comment: occasionally   • Drug use: No   • Sexual activity: Not on file     Other Topics Concern   • Not on file     Social History Narrative       Review of Systems   Constitutional: Negative for activity change, appetite change, chills, diaphoresis, fatigue, fever, unexpected weight change and weight loss.   HENT: Negative for congestion, dental problem, drooling, ear discharge, ear pain, facial swelling, hearing loss, mouth sores, nosebleeds, postnasal drip, rhinorrhea, sinus pressure, sneezing, sore throat, tinnitus, trouble swallowing and voice change.    Eyes: Negative for photophobia, pain, discharge, redness, itching and visual disturbance.   Respiratory: Negative for apnea, cough, choking, chest tightness, shortness of breath, wheezing and stridor.    Cardiovascular: Negative for chest pain, palpitations and leg swelling.   Gastrointestinal: Negative for abdominal distention, abdominal pain, anal bleeding, blood in stool, bowel incontinence, constipation, diarrhea, nausea, rectal pain and vomiting.   Endocrine: Negative for cold intolerance, heat intolerance,  "polydipsia, polyphagia and polyuria.   Genitourinary: Negative for bladder incontinence, decreased urine volume, difficulty urinating, dysuria, enuresis, flank pain, frequency, genital sores, hematuria, pelvic pain and urgency.   Musculoskeletal: Positive for arthralgias, back pain and myalgias. Negative for gait problem, joint swelling, neck pain and neck stiffness.   Skin: Negative for color change, pallor, rash and wound.   Allergic/Immunologic: Negative for environmental allergies, food allergies and immunocompromised state.   Neurological: Positive for headaches. Negative for dizziness, tingling, tremors, seizures, syncope, facial asymmetry, speech difficulty, weakness, light-headedness, numbness and paresthesias.   Hematological: Negative for adenopathy. Does not bruise/bleed easily.   Psychiatric/Behavioral: Negative for agitation, behavioral problems, confusion, decreased concentration, dysphoric mood, hallucinations, self-injury, sleep disturbance and suicidal ideas. The patient is not nervous/anxious and is not hyperactive.    All other systems reviewed and are negative.      Objective   Blood pressure 100/60, temperature 97.9 °F (36.6 °C), height 66\" (167.6 cm), weight 211 lb (95.7 kg).  Body mass index is 34.06 kg/(m^2).    Physical Exam   Constitutional: He is oriented to person, place, and time. He appears well-developed.  Non-toxic appearance.   HENT:   Head: Normocephalic and atraumatic.   Right Ear: Hearing normal.   Left Ear: Hearing normal.   Nose: Nose normal.   Eyes: Conjunctivae, EOM and lids are normal. Pupils are equal, round, and reactive to light.   Neck: Normal range of motion. No JVD present.   Cardiovascular: Normal rate and regular rhythm.    Pulses:       Radial pulses are 2+ on the right side, and 2+ on the left side.   Pulmonary/Chest: Effort normal. No stridor. No respiratory distress. He has no wheezes.   Musculoskeletal:        Thoracic back: He exhibits no tenderness, no " swelling, no pain and no spasm.        Lumbar back: He exhibits decreased range of motion, tenderness and pain. He exhibits no bony tenderness, no swelling and no spasm.   Muscle Group    L          R  Hip Flexor          5          5  Knee Extensor  5          5  ADF                   5          5  APF                   5          5  EHL                   5          5   Neurological: He is alert and oriented to person, place, and time. He has normal strength. He displays normal reflexes. No cranial nerve deficit or sensory deficit. He exhibits normal muscle tone. He displays a negative Romberg sign. Coordination and gait normal. GCS eye subscore is 4. GCS verbal subscore is 5. GCS motor subscore is 6.   Reflex Scores:       Tricep reflexes are 2+ on the right side and 2+ on the left side.       Bicep reflexes are 2+ on the right side and 2+ on the left side.       Brachioradialis reflexes are 2+ on the right side and 2+ on the left side.       Patellar reflexes are 2+ on the right side and 2+ on the left side.       Achilles reflexes are 1+ on the right side and 1+ on the left side.  Skin: Skin is warm and dry. No rash noted. No erythema.   Psychiatric: He has a normal mood and affect. His behavior is normal. Judgment and thought content normal.   Nursing note and vitals reviewed.        Assessment/Plan     Independent Review of Radiographic Studies:      Available for my review is a MRI of the lumbar spine performed on 10/23/17.  This discloses a grade 2 spondylolisthesis of L5 on S1.  The spondylolisthesis measures approximately 13 mm.  There are bilateral pars defects at L5-S1.  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  There is severe neural foraminal stenosis at L5-S1 bilaterally.    Medical Decision Making:      This is a 37-year-old man with a grade 2 isthmic spondylolisthesis.  He is a candidate for an L5-S1 fusion.  He is interested in surgery.  He has failed  "conservative treatment area did    Informed consent for an L5-S1 PLIF was obtained from the patient.  He acknowledges risk of nerve root injury, paralysis, weakness, loss of sensation, permanent neurologic deficit, bleeding, infection, spinal fluid leak, iatrogenic instability, failure of benefit of the operation, or need for additional procedures.  All questions were answered.  No guarantees are given or implied.  The patient elects proceed with surgery.    We will schedule him for an L5-S1 PLIF on an elective basis in the near future.    Alvin was seen today for back pain and leg pain.    Diagnoses and all orders for this visit:    Pars defect of lumbar spine  -     CT Lumbar Spine Without Contrast; Future    Spondylolisthesis at L5-S1 level  -     CT Lumbar Spine Without Contrast; Future      No Follow-up on file.           This document signed by SHEN Jacinto MD November 20, 2017 4:36 PM         Electronically signed by Kevin Jacinto MD at 11/20/2017  4:40 PM      CYNDI Silva at 12/12/2017  7:02 AM          [facility] Pre[facility]    Full history and physical note from office reviewed and updated.  See office note attached.    /91 (BP Location: Right arm, Patient Position: Lying)  Pulse 76  Temp 98.3 °F (36.8 °C) (Temporal Artery )   Resp 16  Ht 167.6 cm (66\")  Wt 93 kg (205 lb)  SpO2 97%  BMI 33.09 kg/m2     Physical Exam:     Cardiovascular - regular rate and rhythm. Normal s1/s2. No heaves, rubs, gallops or murmurs. No peripheral edema     Respiratory - lungs clear to ascultation bilaterally. Unlabored respirations. No wheezes, crackles or rales    IMM:  Influenza:  denies  Pneumococcal:  denies  Tetanus:  denies    Cancer Staging (if applicable)  Cancer Patient: __ yes _x_no __unknown__N/A; If yes, clinical stage T:__ N:__M:__, stage group or __N/A    CYNDI Edwards 12/12/2017 7:02 AM       Electronically signed by Kevin Jacinto MD at 12/12/2017  7:30 " AM    Source Note             Subjective     Chief Complaint: Low back pain    Patient ID: Alvin Gordon is a 37 y.o. male seen for consultation today at the request of  Sanjay New MD    Back Pain   This is a chronic problem. The current episode started more than 1 year ago. The problem occurs constantly. The problem has been gradually worsening since onset. The pain is present in the lumbar spine and sacro-iliac. The quality of the pain is described as aching. The pain radiates to the right foot and left foot. The pain is at a severity of 8/10. The pain is severe. The pain is the same all the time. The symptoms are aggravated by lying down, bending, position, standing and twisting. Stiffness is present all day. Associated symptoms include headaches and leg pain. Pertinent negatives include no abdominal pain, bladder incontinence, bowel incontinence, chest pain, dysuria, fever, numbness, paresis, paresthesias, pelvic pain, perianal numbness, tingling, weakness or weight loss. He has tried analgesics, chiropractic manipulation, heat and NSAIDs for the symptoms. The treatment provided no relief.       This is a 37-year-old man who presents to my clinic with a long-standing history of progressive low back pain.  He has tried home exercises and anti-inflammatories.  These have been ineffective in alleviating his back pain.  He is recently reporting some leg pain that has become progressive.  His pain is affecting his ability to work and sleep.  He reports that his quality of life is diminished as a result of his refractory, chronic low back pain.    The following portions of the patient's history were reviewed and updated as appropriate: allergies, current medications, past family history, past medical history, past social history, past surgical history and problem list.    Family history:   Family History   Problem Relation Age of Onset   • No Known Problems Mother    • No Known Problems Father        Social  history:   Social History     Social History   • Marital status:      Spouse name: N/A   • Number of children: N/A   • Years of education: N/A     Occupational History   • Not on file.     Social History Main Topics   • Smoking status: Never Smoker   • Smokeless tobacco: Never Used   • Alcohol use Yes      Comment: occasionally   • Drug use: No   • Sexual activity: Not on file     Other Topics Concern   • Not on file     Social History Narrative       Review of Systems   Constitutional: Negative for activity change, appetite change, chills, diaphoresis, fatigue, fever, unexpected weight change and weight loss.   HENT: Negative for congestion, dental problem, drooling, ear discharge, ear pain, facial swelling, hearing loss, mouth sores, nosebleeds, postnasal drip, rhinorrhea, sinus pressure, sneezing, sore throat, tinnitus, trouble swallowing and voice change.    Eyes: Negative for photophobia, pain, discharge, redness, itching and visual disturbance.   Respiratory: Negative for apnea, cough, choking, chest tightness, shortness of breath, wheezing and stridor.    Cardiovascular: Negative for chest pain, palpitations and leg swelling.   Gastrointestinal: Negative for abdominal distention, abdominal pain, anal bleeding, blood in stool, bowel incontinence, constipation, diarrhea, nausea, rectal pain and vomiting.   Endocrine: Negative for cold intolerance, heat intolerance, polydipsia, polyphagia and polyuria.   Genitourinary: Negative for bladder incontinence, decreased urine volume, difficulty urinating, dysuria, enuresis, flank pain, frequency, genital sores, hematuria, pelvic pain and urgency.   Musculoskeletal: Positive for arthralgias, back pain and myalgias. Negative for gait problem, joint swelling, neck pain and neck stiffness.   Skin: Negative for color change, pallor, rash and wound.   Allergic/Immunologic: Negative for environmental allergies, food allergies and immunocompromised state.  "  Neurological: Positive for headaches. Negative for dizziness, tingling, tremors, seizures, syncope, facial asymmetry, speech difficulty, weakness, light-headedness, numbness and paresthesias.   Hematological: Negative for adenopathy. Does not bruise/bleed easily.   Psychiatric/Behavioral: Negative for agitation, behavioral problems, confusion, decreased concentration, dysphoric mood, hallucinations, self-injury, sleep disturbance and suicidal ideas. The patient is not nervous/anxious and is not hyperactive.    All other systems reviewed and are negative.      Objective   Blood pressure 100/60, temperature 97.9 °F (36.6 °C), height 66\" (167.6 cm), weight 211 lb (95.7 kg).  Body mass index is 34.06 kg/(m^2).    Physical Exam   Constitutional: He is oriented to person, place, and time. He appears well-developed.  Non-toxic appearance.   HENT:   Head: Normocephalic and atraumatic.   Right Ear: Hearing normal.   Left Ear: Hearing normal.   Nose: Nose normal.   Eyes: Conjunctivae, EOM and lids are normal. Pupils are equal, round, and reactive to light.   Neck: Normal range of motion. No JVD present.   Cardiovascular: Normal rate and regular rhythm.    Pulses:       Radial pulses are 2+ on the right side, and 2+ on the left side.   Pulmonary/Chest: Effort normal. No stridor. No respiratory distress. He has no wheezes.   Musculoskeletal:        Thoracic back: He exhibits no tenderness, no swelling, no pain and no spasm.        Lumbar back: He exhibits decreased range of motion, tenderness and pain. He exhibits no bony tenderness, no swelling and no spasm.   Muscle Group    L          R  Hip Flexor          5          5  Knee Extensor  5          5  ADF                   5          5  APF                   5          5  EHL                   5          5   Neurological: He is alert and oriented to person, place, and time. He has normal strength. He displays normal reflexes. No cranial nerve deficit or sensory deficit. He " exhibits normal muscle tone. He displays a negative Romberg sign. Coordination and gait normal. GCS eye subscore is 4. GCS verbal subscore is 5. GCS motor subscore is 6.   Reflex Scores:       Tricep reflexes are 2+ on the right side and 2+ on the left side.       Bicep reflexes are 2+ on the right side and 2+ on the left side.       Brachioradialis reflexes are 2+ on the right side and 2+ on the left side.       Patellar reflexes are 2+ on the right side and 2+ on the left side.       Achilles reflexes are 1+ on the right side and 1+ on the left side.  Skin: Skin is warm and dry. No rash noted. No erythema.   Psychiatric: He has a normal mood and affect. His behavior is normal. Judgment and thought content normal.   Nursing note and vitals reviewed.        Assessment/Plan     Independent Review of Radiographic Studies:      Available for my review is a MRI of the lumbar spine performed on 10/23/17.  This discloses a grade 2 spondylolisthesis of L5 on S1.  The spondylolisthesis measures approximately 13 mm.  There are bilateral pars defects at L5-S1.  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  There is severe neural foraminal stenosis at L5-S1 bilaterally.    Medical Decision Making:      This is a 37-year-old man with a grade 2 isthmic spondylolisthesis.  He is a candidate for an L5-S1 fusion.  He is interested in surgery.  He has failed conservative treatment area did    Informed consent for an L5-S1 PLIF was obtained from the patient.  He acknowledges risk of nerve root injury, paralysis, weakness, loss of sensation, permanent neurologic deficit, bleeding, infection, spinal fluid leak, iatrogenic instability, failure of benefit of the operation, or need for additional procedures.  All questions were answered.  No guarantees are given or implied.  The patient elects proceed with surgery.    We will schedule him for an L5-S1 PLIF on an elective basis in the near future.    Alvin  was seen today for back pain and leg pain.    Diagnoses and all orders for this visit:    Pars defect of lumbar spine  -     CT Lumbar Spine Without Contrast; Future    Spondylolisthesis at L5-S1 level  -     CT Lumbar Spine Without Contrast; Future      No Follow-up on file.           This document signed by SHEN Jacinto MD November 20, 2017 4:36 PM            Electronically signed by Kevin Jacinto MD at 11/20/2017  4:37 PM            Kevin Jacinto MD at 12/12/2017  7:29 AM            H&P reviewed. The patient was examined and there are no changes to the H&P.         Electronically signed by Kevin Jacinto MD at 12/12/2017  7:29 AM    Source Note             Subjective     Chief Complaint: Low back pain    Patient ID: Alvin Gordon is a 37 y.o. male seen for consultation today at the request of  Sanjay New MD    Back Pain   This is a chronic problem. The current episode started more than 1 year ago. The problem occurs constantly. The problem has been gradually worsening since onset. The pain is present in the lumbar spine and sacro-iliac. The quality of the pain is described as aching. The pain radiates to the right foot and left foot. The pain is at a severity of 8/10. The pain is severe. The pain is the same all the time. The symptoms are aggravated by lying down, bending, position, standing and twisting. Stiffness is present all day. Associated symptoms include headaches and leg pain. Pertinent negatives include no abdominal pain, bladder incontinence, bowel incontinence, chest pain, dysuria, fever, numbness, paresis, paresthesias, pelvic pain, perianal numbness, tingling, weakness or weight loss. He has tried analgesics, chiropractic manipulation, heat and NSAIDs for the symptoms. The treatment provided no relief.       This is a 37-year-old man who presents to my clinic with a long-standing history of progressive low back pain.  He has tried home exercises and  anti-inflammatories.  These have been ineffective in alleviating his back pain.  He is recently reporting some leg pain that has become progressive.  His pain is affecting his ability to work and sleep.  He reports that his quality of life is diminished as a result of his refractory, chronic low back pain.    The following portions of the patient's history were reviewed and updated as appropriate: allergies, current medications, past family history, past medical history, past social history, past surgical history and problem list.    Family history:   Family History   Problem Relation Age of Onset   • No Known Problems Mother    • No Known Problems Father        Social history:   Social History     Social History   • Marital status:      Spouse name: N/A   • Number of children: N/A   • Years of education: N/A     Occupational History   • Not on file.     Social History Main Topics   • Smoking status: Never Smoker   • Smokeless tobacco: Never Used   • Alcohol use Yes      Comment: occasionally   • Drug use: No   • Sexual activity: Not on file     Other Topics Concern   • Not on file     Social History Narrative       Review of Systems   Constitutional: Negative for activity change, appetite change, chills, diaphoresis, fatigue, fever, unexpected weight change and weight loss.   HENT: Negative for congestion, dental problem, drooling, ear discharge, ear pain, facial swelling, hearing loss, mouth sores, nosebleeds, postnasal drip, rhinorrhea, sinus pressure, sneezing, sore throat, tinnitus, trouble swallowing and voice change.    Eyes: Negative for photophobia, pain, discharge, redness, itching and visual disturbance.   Respiratory: Negative for apnea, cough, choking, chest tightness, shortness of breath, wheezing and stridor.    Cardiovascular: Negative for chest pain, palpitations and leg swelling.   Gastrointestinal: Negative for abdominal distention, abdominal pain, anal bleeding, blood in stool, bowel  "incontinence, constipation, diarrhea, nausea, rectal pain and vomiting.   Endocrine: Negative for cold intolerance, heat intolerance, polydipsia, polyphagia and polyuria.   Genitourinary: Negative for bladder incontinence, decreased urine volume, difficulty urinating, dysuria, enuresis, flank pain, frequency, genital sores, hematuria, pelvic pain and urgency.   Musculoskeletal: Positive for arthralgias, back pain and myalgias. Negative for gait problem, joint swelling, neck pain and neck stiffness.   Skin: Negative for color change, pallor, rash and wound.   Allergic/Immunologic: Negative for environmental allergies, food allergies and immunocompromised state.   Neurological: Positive for headaches. Negative for dizziness, tingling, tremors, seizures, syncope, facial asymmetry, speech difficulty, weakness, light-headedness, numbness and paresthesias.   Hematological: Negative for adenopathy. Does not bruise/bleed easily.   Psychiatric/Behavioral: Negative for agitation, behavioral problems, confusion, decreased concentration, dysphoric mood, hallucinations, self-injury, sleep disturbance and suicidal ideas. The patient is not nervous/anxious and is not hyperactive.    All other systems reviewed and are negative.      Objective   Blood pressure 100/60, temperature 97.9 °F (36.6 °C), height 66\" (167.6 cm), weight 211 lb (95.7 kg).  Body mass index is 34.06 kg/(m^2).    Physical Exam   Constitutional: He is oriented to person, place, and time. He appears well-developed.  Non-toxic appearance.   HENT:   Head: Normocephalic and atraumatic.   Right Ear: Hearing normal.   Left Ear: Hearing normal.   Nose: Nose normal.   Eyes: Conjunctivae, EOM and lids are normal. Pupils are equal, round, and reactive to light.   Neck: Normal range of motion. No JVD present.   Cardiovascular: Normal rate and regular rhythm.    Pulses:       Radial pulses are 2+ on the right side, and 2+ on the left side.   Pulmonary/Chest: Effort normal. " No stridor. No respiratory distress. He has no wheezes.   Musculoskeletal:        Thoracic back: He exhibits no tenderness, no swelling, no pain and no spasm.        Lumbar back: He exhibits decreased range of motion, tenderness and pain. He exhibits no bony tenderness, no swelling and no spasm.   Muscle Group    L          R  Hip Flexor          5          5  Knee Extensor  5          5  ADF                   5          5  APF                   5          5  EHL                   5          5   Neurological: He is alert and oriented to person, place, and time. He has normal strength. He displays normal reflexes. No cranial nerve deficit or sensory deficit. He exhibits normal muscle tone. He displays a negative Romberg sign. Coordination and gait normal. GCS eye subscore is 4. GCS verbal subscore is 5. GCS motor subscore is 6.   Reflex Scores:       Tricep reflexes are 2+ on the right side and 2+ on the left side.       Bicep reflexes are 2+ on the right side and 2+ on the left side.       Brachioradialis reflexes are 2+ on the right side and 2+ on the left side.       Patellar reflexes are 2+ on the right side and 2+ on the left side.       Achilles reflexes are 1+ on the right side and 1+ on the left side.  Skin: Skin is warm and dry. No rash noted. No erythema.   Psychiatric: He has a normal mood and affect. His behavior is normal. Judgment and thought content normal.   Nursing note and vitals reviewed.        Assessment/Plan     Independent Review of Radiographic Studies:      Available for my review is a MRI of the lumbar spine performed on 10/23/17.  This discloses a grade 2 spondylolisthesis of L5 on S1.  The spondylolisthesis measures approximately 13 mm.  There are bilateral pars defects at L5-S1.  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  There is severe neural foraminal stenosis at L5-S1 bilaterally.    Medical Decision Making:      This is a 37-year-old man with a  grade 2 isthmic spondylolisthesis.  He is a candidate for an L5-S1 fusion.  He is interested in surgery.  He has failed conservative treatment area did    Informed consent for an L5-S1 PLIF was obtained from the patient.  He acknowledges risk of nerve root injury, paralysis, weakness, loss of sensation, permanent neurologic deficit, bleeding, infection, spinal fluid leak, iatrogenic instability, failure of benefit of the operation, or need for additional procedures.  All questions were answered.  No guarantees are given or implied.  The patient elects proceed with surgery.    We will schedule him for an L5-S1 PLIF on an elective basis in the near future.    Alvin was seen today for back pain and leg pain.    Diagnoses and all orders for this visit:    Pars defect of lumbar spine  -     CT Lumbar Spine Without Contrast; Future    Spondylolisthesis at L5-S1 level  -     CT Lumbar Spine Without Contrast; Future      No Follow-up on file.           This document signed by SHEN Jacinto MD November 20, 2017 4:36 PM          Electronically signed by Kevin Jacinto MD at 11/20/2017  4:40 PM              Physician Progress Notes (last 24 hours) (Notes from 12/15/2017 11:20 AM through 12/16/2017 11:20 AM)     No notes of this type exist for this encounter.

## 2017-12-18 ENCOUNTER — TRANSITIONAL CARE MANAGEMENT TELEPHONE ENCOUNTER (OUTPATIENT)
Dept: INTERNAL MEDICINE | Facility: CLINIC | Age: 37
End: 2017-12-18

## 2017-12-18 NOTE — OUTREACH NOTE
I spoke directly to the patient to obtain the information for the [facility] discharge call note.The patient was admitted to [facility] 12/12/2017. Dx of Pars defect of lumbar spine  . Dicharges to home. The patient was advised to notify PCP and seel UTC or ER eval if sx's reoccur, worsen, or condition changes unexpectedly. The patient verbalized understanding.

## 2017-12-19 ENCOUNTER — TELEPHONE (OUTPATIENT)
Dept: NEUROSURGERY | Facility: CLINIC | Age: 37
End: 2017-12-19

## 2017-12-19 NOTE — TELEPHONE ENCOUNTER
I spoke with pt.  I also called pharmacy and gave them a 14 day free trial card for Lyrica.  Pt is aware.  I let him know I would start the PA process tomorrow morning for Lyrica and Percocet.     He was able to  the Oxycodone and Diazepam.

## 2017-12-20 ENCOUNTER — DOCUMENTATION (OUTPATIENT)
Dept: NEUROSURGERY | Facility: CLINIC | Age: 37
End: 2017-12-20

## 2017-12-20 NOTE — PROGRESS NOTES
"Oxycontin was denied due to the Dx being \"Acute pain\" so with Dr. Jacinto's approval I have changed that to S/P lumbar fusion. Awaiting response.   "

## 2017-12-26 NOTE — DISCHARGE SUMMARY
DISCHARGE SUMMARY    Date of Admission: 12/12/2017    Date of Discharge:  12/26/2017    Discharge Diagnosis:   Grade 2 spondylolisthesis  Bilateral pars defect  Lumbosacral radiculopathy    Procedures Performed:  Procedure(s):  LUMBAR LAMINECTOMY, POSTERIOR LUMBAR INTERBODY FUSION L5-S1    Referring Physician: Sanjay New MD    Presenting Problem/History of Present Illness  Pars defect of lumbar spine [M43.06]  Pars defect of lumbar spine [M43.06]     Hospital Course  Patient is a 37 y.o. male who presented with chronic low back pain with progressive, bilateral leg pain. MRI showed a 13mm spondylolisthesis of L5 on S1 with bilateral pars defects causing severe neural foraminal stenosis at L5-S1 bilaterally. Conservative treatment was ineffective, and he was scheduled for a lumbar fusion at this level.  Surgery was performed on 12/12/17. Interbody device was not able to be used to due configuration, but satisfactory placement of the interbody fusion screws was achieved and generous foraminotomies were accomplished. A durotomy occurred and was repaired primarily with suture and muscle pledget.   Patient was kept flat for 24hrs, and then allowed to ambulate. He had moderate serosanguinous drainage and his PING was removed on POD 2. He had a mild headache, but it was not positional.  Once his PING was removed, he had quite a bit of drainage from the PING insertion site. He was kept until 12/16 for observation. By this time, dressing was clean, dry, and intact. He was discharged to home on POD 4.    Discharge Medications   Alvin Gordon   Home Medication Instructions MAYELA:[id number]    Printed on:12/26/17 1017   Medication Information                      docusate sodium 100 MG capsule  Take 100 mg by mouth 2 (Two) Times a Day As Needed for Constipation.             oxyCODONE (OXYCONTIN) 10 MG 12 hr tablet  Take 1 tablet by mouth Every 8 (Eight) Hours As Needed for Moderate Pain .             pregabalin (LYRICA) 75 MG  capsule  Take 1 capsule by mouth Every 12 (Twelve) Hours.               Discharge to home with walker for assistance with ambulation  Follow up 2 weeks with neurosurgery PA  Call NS office for positional headache, fever, or drainage from incision      Kyra Nogueira PA-C  12/26/17  10:17 AM

## 2017-12-27 ENCOUNTER — PRIOR AUTHORIZATION (OUTPATIENT)
Dept: NEUROSURGERY | Facility: CLINIC | Age: 37
End: 2017-12-27

## 2017-12-27 ENCOUNTER — TELEPHONE (OUTPATIENT)
Dept: NEUROSURGERY | Facility: CLINIC | Age: 37
End: 2017-12-27

## 2017-12-27 RX ORDER — GABAPENTIN 300 MG/1
300 CAPSULE ORAL 3 TIMES DAILY
Qty: 60 CAPSULE | Refills: 0 | Status: SHIPPED | OUTPATIENT
Start: 2017-12-27 | End: 2018-05-04

## 2017-12-27 NOTE — TELEPHONE ENCOUNTER
Provider:  [place]  Caller: fax from [place]   Surgery:  PLIF  Surgery Date: 12/12/17   Last visit:   11/20/17  Next visit: [facility] MEI:         Reason for call:         [employer] faxed a notification over stating that the Lyrica is not covered and that the pt must try gabapentin first. Still no response on the percocet PA.     Phone: [phone]  Fax: [phone]

## 2017-12-28 RX ORDER — OXYCODONE AND ACETAMINOPHEN 7.5; 325 MG/1; MG/1
1 TABLET ORAL EVERY 4 HOURS PRN
Qty: 45 TABLET | Refills: 0 | Status: SHIPPED | OUTPATIENT
Start: 2017-12-28 | End: 2018-01-17 | Stop reason: SDUPTHER

## 2017-12-28 NOTE — TELEPHONE ENCOUNTER
Pt called in, said that he received a message that Lyrica was approved but when he called his pharmacy they did not have it. Attempted to call Roseann to find out which pharmacy it was sent to, she was busy, routing message to her.

## 2017-12-28 NOTE — TELEPHONE ENCOUNTER
[facility] [address]  I called pt and he said he called [facility] and [facility] in [place] and niether of them had record. I asked him for clarification on which pharmacy he wanted and he again said [facility] on [address].  I called the pharmacy to make sure they have it on file.  Pt should be able to get Rx at [facility] on [address].       Pt also would like a RF on his Percocet:    HAMIDA:    12/16/2017 Oxycodone/Acetaminophen  325MG/7.5MG  1980 60 10 Kevin Jacinto [id number] 68 1  12/19/2017 Lyrica 75MG 1980 28 14 Kevin Jacinto [facility] [id number]  [facility] 2

## 2018-01-03 ENCOUNTER — OFFICE VISIT (OUTPATIENT)
Dept: NEUROSURGERY | Facility: CLINIC | Age: 38
End: 2018-01-03

## 2018-01-03 VITALS
TEMPERATURE: 98.3 F | SYSTOLIC BLOOD PRESSURE: 130 MMHG | BODY MASS INDEX: 31.37 KG/M2 | DIASTOLIC BLOOD PRESSURE: 82 MMHG | WEIGHT: 195.2 LBS | HEIGHT: 66 IN

## 2018-01-03 DIAGNOSIS — M43.06 PARS DEFECT OF LUMBAR SPINE: ICD-10-CM

## 2018-01-03 DIAGNOSIS — Z98.1 S/P LUMBAR FUSION: Primary | ICD-10-CM

## 2018-01-03 PROCEDURE — 99024 POSTOP FOLLOW-UP VISIT: CPT | Performed by: PHYSICIAN ASSISTANT

## 2018-01-03 NOTE — PROGRESS NOTES
Subjective     Chief Complaint: : Alvin Gordon is a 37 y.o. male here for follow up after L5-S1 fusion on 12/12/17.    History of Present Illness  Mr. Gordon is a 38yo male who presented with chronic low back pain with progressive, bilateral leg pain. MRI showed a 13mm spondylolisthesis of L5 on S1 with bilateral pars defects causing severe neural foraminal stenosis at L5-S1 bilaterally. Conservative treatment was ineffective, and he was scheduled for a lumbar fusion at this level.  Surgery was performed on 12/12/17. Interbody device was not able to be used to due configuration, but satisfactory placement of the interbody fusion screws was achieved and generous foraminotomies were accomplished. A durotomy occurred and was repaired primarily with suture and muscle pledget.   Patient was kept flat for 24hrs, and then allowed to ambulate. He had moderate serosanguinous drainage and his PING was removed on POD 2. He had a mild headache, but it was not positional.  Once his PING was removed, he had quite a bit of drainage from the PING insertion site. He was kept until 12/16 for observation. By this time, dressing was clean, dry, and intact. He was discharged to home on POD 4.  Since surgery, he notes that his leg pain is much better and that only if he does too much walking does he have much of an issue.  He still has some incisional soreness, and is taking Percocet q4hrs and Lyrica q12 hours which are both helping.  He has not had any positional headaches and has not noticed any swelling or drainage from his incision, although he did have a rash from the adhesive of the dressing, which has now resolved. He has not had any difficulty voiding. He denies fever or chills.     The following portions of the patient's history were reviewed and updated as appropriate: allergies, current medications, past family history, past medical history, past social history, past surgical history and problem list.    Family history:   Family  History   Problem Relation Age of Onset   • No Known Problems Mother    • No Known Problems Father        Social history:   Social History     Social History   • Marital status:      Spouse name: N/A   • Number of children: N/A   • Years of education: N/A     Occupational History   • Not on file.     Social History Main Topics   • Smoking status: Never Smoker   • Smokeless tobacco: Never Used   • Alcohol use Yes      Comment: occasionally   • Drug use: No   • Sexual activity: Defer     Other Topics Concern   • Not on file     Social History Narrative       Review of Systems   Constitutional: Negative for activity change, appetite change, chills, diaphoresis, fatigue, fever and unexpected weight change.   HENT: Negative for congestion, dental problem, drooling, ear discharge, ear pain, facial swelling, hearing loss, mouth sores, nosebleeds, postnasal drip, rhinorrhea, sinus pressure, sneezing, sore throat, tinnitus, trouble swallowing and voice change.    Eyes: Negative for photophobia, pain, discharge, redness, itching and visual disturbance.   Respiratory: Negative for apnea, cough, choking, chest tightness, shortness of breath, wheezing and stridor.    Cardiovascular: Negative for chest pain, palpitations and leg swelling.   Gastrointestinal: Negative for abdominal distention, abdominal pain, anal bleeding, blood in stool, constipation, diarrhea, nausea, rectal pain and vomiting.   Endocrine: Negative for cold intolerance, heat intolerance, polydipsia, polyphagia and polyuria.   Genitourinary: Negative for decreased urine volume, difficulty urinating, dysuria, enuresis, flank pain, frequency, genital sores, hematuria and urgency.   Musculoskeletal: Positive for back pain and myalgias. Negative for arthralgias, gait problem, joint swelling, neck pain and neck stiffness.   Skin: Negative for color change, pallor, rash and wound.   Allergic/Immunologic: Negative for environmental allergies, food allergies and  "immunocompromised state.   Neurological: Negative for dizziness, tremors, seizures, syncope, facial asymmetry, speech difficulty, weakness, light-headedness, numbness and headaches.   Hematological: Negative for adenopathy. Does not bruise/bleed easily.   Psychiatric/Behavioral: Negative for agitation, behavioral problems, confusion, decreased concentration, dysphoric mood, hallucinations, self-injury, sleep disturbance and suicidal ideas. The patient is not nervous/anxious and is not hyperactive.        Objective   Blood pressure 130/82, temperature 98.3 °F (36.8 °C), height 167.6 cm (65.98\"), weight 88.5 kg (195 lb 3.2 oz).  Body mass index is 31.52 kg/(m^2).    Physical Exam   Constitutional: He is oriented to person, place, and time. He appears well-developed and well-nourished. No distress.   HENT:   Head: Normocephalic and atraumatic.   Eyes: EOM are normal. Pupils are equal, round, and reactive to light.   Neck: Normal range of motion. Neck supple.   Pulmonary/Chest: Effort normal. No respiratory distress.   Musculoskeletal: Normal range of motion.   Gait is steady and independent.    Neurological: He is alert and oriented to person, place, and time. He has normal reflexes. Coordination normal.   Skin:   Lumbar incision is clean, dry, and intact with no swelling.  There is a nylon suture over the PING insertion site; this is clean and dry as well without erythema. The suture was cleaned well with alcohol and removed; no drainage resulted.      Assessment/Plan   Mr. Gordon is doing well. We still want him to observe lifting and activity restrictions, so will not start PT at this point.  We will plan to see him back in 3 weeks for follow up with xrays of his lumbar spine. He obtained refills of his pain medication last week. His lyrica required pre-authorization and is still pending.     Alvin was seen today for post-op.    Diagnoses and all orders for this visit:    S/P lumbar fusion  -     XR Spine Lumbar 2 " or 3 View; Future    Pars defect of lumbar spine  -     XR Spine Lumbar 2 or 3 View; Future      Return in about 3 weeks (around 1/24/2018).

## 2018-01-12 ENCOUNTER — OFFICE VISIT (OUTPATIENT)
Dept: NEUROSURGERY | Facility: CLINIC | Age: 38
End: 2018-01-12

## 2018-01-12 ENCOUNTER — TELEPHONE (OUTPATIENT)
Dept: NEUROSURGERY | Facility: CLINIC | Age: 38
End: 2018-01-12

## 2018-01-12 ENCOUNTER — APPOINTMENT (OUTPATIENT)
Dept: LAB | Facility: HOSPITAL | Age: 38
End: 2018-01-12

## 2018-01-12 VITALS
TEMPERATURE: 97.2 F | DIASTOLIC BLOOD PRESSURE: 80 MMHG | HEIGHT: 66 IN | SYSTOLIC BLOOD PRESSURE: 130 MMHG | WEIGHT: 195.8 LBS | BODY MASS INDEX: 31.47 KG/M2

## 2018-01-12 DIAGNOSIS — Z98.1 S/P LUMBAR FUSION: Primary | ICD-10-CM

## 2018-01-12 DIAGNOSIS — T81.89XA DRAINING POSTOPERATIVE WOUND, INITIAL ENCOUNTER: ICD-10-CM

## 2018-01-12 LAB
CRP SERPL-MCNC: 0.39 MG/DL (ref 0–1)
ERYTHROCYTE [SEDIMENTATION RATE] IN BLOOD: 11 MM/HR (ref 0–15)

## 2018-01-12 PROCEDURE — 87186 SC STD MICRODIL/AGAR DIL: CPT | Performed by: PHYSICIAN ASSISTANT

## 2018-01-12 PROCEDURE — 87070 CULTURE OTHR SPECIMN AEROBIC: CPT | Performed by: PHYSICIAN ASSISTANT

## 2018-01-12 PROCEDURE — 99024 POSTOP FOLLOW-UP VISIT: CPT | Performed by: PHYSICIAN ASSISTANT

## 2018-01-12 PROCEDURE — 87077 CULTURE AEROBIC IDENTIFY: CPT | Performed by: PHYSICIAN ASSISTANT

## 2018-01-12 PROCEDURE — 87205 SMEAR GRAM STAIN: CPT | Performed by: PHYSICIAN ASSISTANT

## 2018-01-12 PROCEDURE — 87185 SC STD ENZYME DETCJ PER NZM: CPT | Performed by: PHYSICIAN ASSISTANT

## 2018-01-12 PROCEDURE — 87147 CULTURE TYPE IMMUNOLOGIC: CPT | Performed by: PHYSICIAN ASSISTANT

## 2018-01-12 PROCEDURE — 85652 RBC SED RATE AUTOMATED: CPT | Performed by: PHYSICIAN ASSISTANT

## 2018-01-12 PROCEDURE — 86140 C-REACTIVE PROTEIN: CPT | Performed by: PHYSICIAN ASSISTANT

## 2018-01-12 PROCEDURE — 36415 COLL VENOUS BLD VENIPUNCTURE: CPT | Performed by: PHYSICIAN ASSISTANT

## 2018-01-12 RX ORDER — SULFAMETHOXAZOLE AND TRIMETHOPRIM 800; 160 MG/1; MG/1
1 TABLET ORAL 2 TIMES DAILY
Qty: 20 TABLET | Refills: 0 | Status: SHIPPED | OUTPATIENT
Start: 2018-01-12 | End: 2018-01-22

## 2018-01-12 NOTE — TELEPHONE ENCOUNTER
Provider:  Orville  Caller: Lyric  Time of call:   934am  Phone #:  [phone]  Surgery:  PLIF L5-S1  Surgery Date:  12/12/17  Last visit:   01/03/18  Next visit: 01/24/18             Reason for call:       yesica Gunter's significant other called this morning in regards to . She states that the top of his incision looks like it is opening. Its sore to the touch, Red & puffy and has white/green-meg drainage.    Spoke with Mary Grace, Pt is coming in today to be seen.

## 2018-01-12 NOTE — PROGRESS NOTES
"Subjective     Chief Complaint:  Alvin Gordon is a 37 y.o. male who had a lumbar fusion at L5-S1 on 12/12/17. He noticed some wound drainage last night and is here for a wound check.     History of Present Illness  Mr. Gordon is a 38yo male who presented with chronic low back pain with progressive, bilateral leg pain. MRI showed a 13mm spondylolisthesis of L5 on S1 with bilateral pars defects causing severe neural foraminal stenosis at L5-S1 bilaterally. Conservative treatment was ineffective, and he was scheduled for a lumbar fusion at this level.  Surgery was performed on 12/12/17. Interbody device was not able to be used to due configuration, but satisfactory placement of the interbody fusion screws was achieved and generous foraminotomies were accomplished. A durotomy occurred and was repaired primarily. Incision was closed with nylon sutures. He was kept flat for 24hrs before being allowed up with PT/OT. He has not had any wound drainage, redness, or swelling.    He was seen in the office on 1/3/18 for suture removal. There was some redness along his incision at that time, but no drainage or separation.   He notes that his incision has been tender since surgery, but felt that it was more so over the last 3 days or so. Last night, he felt it and had some drainage on his hands. His family also noticed a small spot of drainage that was \"yellow and bloody\" on his shirt as well.   He denies fevers or chills. He has had minimal drainage, enough to cover a regular bandaid about 75%, but no dripping or saturation of dressing or clothing. He has not had headache or neck stiffness.       The following portions of the patient's history were reviewed and updated as appropriate: allergies, current medications, past family history, past medical history, past social history, past surgical history and problem list.    Family history:   Family History   Problem Relation Age of Onset   • No Known Problems Mother    • No " Known Problems Father        Social history:   Social History     Social History   • Marital status:      Spouse name: N/A   • Number of children: N/A   • Years of education: N/A     Occupational History   • Not on file.     Social History Main Topics   • Smoking status: Never Smoker   • Smokeless tobacco: Never Used   • Alcohol use Yes      Comment: occasionally   • Drug use: No   • Sexual activity: Defer     Other Topics Concern   • Not on file     Social History Narrative       Review of Systems   Constitutional: Negative for activity change, appetite change, chills, diaphoresis, fatigue, fever and unexpected weight change.   HENT: Negative for congestion, dental problem, drooling, ear discharge, ear pain, facial swelling, hearing loss, mouth sores, nosebleeds, postnasal drip, rhinorrhea, sinus pressure, sneezing, sore throat, tinnitus, trouble swallowing and voice change.    Eyes: Negative for photophobia, pain, discharge, redness, itching and visual disturbance.   Respiratory: Negative for apnea, cough, choking, chest tightness, shortness of breath, wheezing and stridor.    Cardiovascular: Negative for chest pain, palpitations and leg swelling.   Gastrointestinal: Negative for abdominal distention, abdominal pain, anal bleeding, blood in stool, constipation, diarrhea, nausea, rectal pain and vomiting.   Endocrine: Negative for cold intolerance, heat intolerance, polydipsia, polyphagia and polyuria.   Genitourinary: Negative for decreased urine volume, difficulty urinating, dysuria, enuresis, flank pain, frequency, genital sores, hematuria and urgency.   Musculoskeletal: Positive for back pain and myalgias. Negative for arthralgias, gait problem, joint swelling, neck pain and neck stiffness.   Skin: Positive for wound. Negative for color change, pallor and rash.   Allergic/Immunologic: Negative for environmental allergies, food allergies and immunocompromised state.   Neurological: Negative for dizziness,  "tremors, seizures, syncope, facial asymmetry, speech difficulty, weakness, light-headedness, numbness and headaches.   Hematological: Negative for adenopathy. Does not bruise/bleed easily.   Psychiatric/Behavioral: Negative for agitation, behavioral problems, confusion, decreased concentration, dysphoric mood, hallucinations, self-injury, sleep disturbance and suicidal ideas. The patient is not nervous/anxious and is not hyperactive.        Objective   Blood pressure 130/80, temperature 97.2 °F (36.2 °C), height 167.6 cm (65.98\"), weight 88.8 kg (195 lb 12.8 oz).  Body mass index is 31.62 kg/(m^2).    Physical Exam  Patient is alert and oriented, slightly diaphoretic (have noticed this on previous encounters), in no distress. Afebrile. His incision is clean, dry, and intact execpt for the most inferior 2cm or so, which are reddened and slightly . There is no leaking or dripping of material, but pressure around the edges of the incison releases a scant amount of purulent drainage.    The incision was cleaned well, prepped with betadyne, and a culture was taken of the drainage.    Patient was sent for labs: cbc, esr, C-RP, and wound culture.     Assessment/Plan   Mr. Gordon has a small wound infection which looks like a superficial stitch abscess.  We will start him on Bactrim DS BID x10 days and re-evaluate on Monday when wound cultures are final.  Labs will be checked today. We will see him back next Wednesday, 1/17/18 He is to call if he develops fevers or worsening drainage.     Alvin was seen today for post-op.    Diagnoses and all orders for this visit:    S/P lumbar fusion  -     CBC & Differential; Future  -     C-reactive Protein  -     Sedimentation Rate  -     Wound Culture - Surgical Site, Back, Lower    Draining postoperative wound, initial encounter  -     CBC & Differential; Future  -     C-reactive Protein  -     Sedimentation Rate  -     Wound Culture - Surgical Site, Back, Lower    Other " orders  -     sulfamethoxazole-trimethoprim (BACTRIM DS) 800-160 MG per tablet; Take 1 tablet by mouth 2 (Two) Times a Day for 10 days.      Return in about 1 week (around 1/19/2018).

## 2018-01-14 LAB
BACTERIA SPEC AEROBE CULT: ABNORMAL
BACTERIA SPEC AEROBE CULT: ABNORMAL
GRAM STN SPEC: ABNORMAL
GRAM STN SPEC: ABNORMAL

## 2018-01-17 ENCOUNTER — OFFICE VISIT (OUTPATIENT)
Dept: NEUROSURGERY | Facility: CLINIC | Age: 38
End: 2018-01-17

## 2018-01-17 VITALS
TEMPERATURE: 97.8 F | WEIGHT: 199.4 LBS | BODY MASS INDEX: 32.05 KG/M2 | HEIGHT: 66 IN | DIASTOLIC BLOOD PRESSURE: 60 MMHG | SYSTOLIC BLOOD PRESSURE: 90 MMHG

## 2018-01-17 DIAGNOSIS — Z98.1 S/P LUMBAR FUSION: Primary | ICD-10-CM

## 2018-01-17 DIAGNOSIS — T81.89XD DRAINING POSTOPERATIVE WOUND, SUBSEQUENT ENCOUNTER: ICD-10-CM

## 2018-01-17 PROCEDURE — 99024 POSTOP FOLLOW-UP VISIT: CPT | Performed by: PHYSICIAN ASSISTANT

## 2018-01-17 RX ORDER — OXYCODONE AND ACETAMINOPHEN 7.5; 325 MG/1; MG/1
1 TABLET ORAL EVERY 4 HOURS PRN
Qty: 45 TABLET | Refills: 0 | Status: SHIPPED | OUTPATIENT
Start: 2018-01-17 | End: 2018-05-04

## 2018-01-17 NOTE — PROGRESS NOTES
Subjective     Chief Complaint:  Alvin Gordon is a 37 y.o. male here today for wound check. He is s/p L5/S1 lumbar fusion on 12/12/17    History of Present Illness  Mr. Gordon is a 36yo male who presented with chronic low back pain with progressive, bilateral leg pain from spondylolisthesis.  Conservative treatment was ineffective, and on 12/12/17, he underwent a lumbar fusion at this level. A durotomy occurred during surgery and was repaired primarily. Incision was closed with nylon sutures.  He was seen on 1/3/18 for suture removal and his incision was healing well, but he called last week about some new drainage from his incision. He was asked to come in for a wound check and was found to have a small stitch abscess at the top of his incision. A culture was taken, and he was sent for lab work. He was also started on Bactrim DS bid.   Today, he reports that he has not had any more drainage since this weekend. He has been taking his antibiotics as scheduled.   He does note that he has had a steady headache for the last two days. It is not positional in nature, but is also not really responding to medication, fluids, etc. He denies any fevers or chills. He has not had any bowel or bladder dysfunction.    The following portions of the patient's history were reviewed and updated as appropriate: allergies, current medications, past family history, past medical history, past social history, past surgical history and problem list.    Family history:   Family History   Problem Relation Age of Onset   • No Known Problems Mother    • No Known Problems Father        Social history:   Social History     Social History   • Marital status:      Spouse name: N/A   • Number of children: N/A   • Years of education: N/A     Occupational History   • Not on file.     Social History Main Topics   • Smoking status: Never Smoker   • Smokeless tobacco: Never Used   • Alcohol use Yes      Comment: occasionally   • Drug use: No    • Sexual activity: Defer     Other Topics Concern   • Not on file     Social History Narrative       Review of Systems   Constitutional: Negative for activity change, appetite change, chills, diaphoresis, fatigue, fever and unexpected weight change.   HENT: Negative for congestion, dental problem, drooling, ear discharge, ear pain, facial swelling, hearing loss, mouth sores, nosebleeds, postnasal drip, rhinorrhea, sinus pressure, sneezing, sore throat, tinnitus, trouble swallowing and voice change.    Eyes: Negative for photophobia, pain, discharge, redness, itching and visual disturbance.   Respiratory: Negative for apnea, cough, choking, chest tightness, shortness of breath, wheezing and stridor.    Cardiovascular: Negative for chest pain, palpitations and leg swelling.   Gastrointestinal: Negative for abdominal distention, abdominal pain, anal bleeding, blood in stool, constipation, diarrhea, nausea, rectal pain and vomiting.   Endocrine: Negative for cold intolerance, heat intolerance, polydipsia, polyphagia and polyuria.   Genitourinary: Negative for decreased urine volume, difficulty urinating, dysuria, enuresis, flank pain, frequency, genital sores, hematuria and urgency.   Musculoskeletal: Positive for back pain and myalgias. Negative for arthralgias, gait problem, joint swelling, neck pain and neck stiffness.   Skin: Negative for color change, pallor, rash and wound.   Allergic/Immunologic: Negative for environmental allergies, food allergies and immunocompromised state.   Neurological: Negative for dizziness, tremors, seizures, syncope, facial asymmetry, speech difficulty, weakness, light-headedness, numbness and headaches.   Hematological: Negative for adenopathy. Does not bruise/bleed easily.   Psychiatric/Behavioral: Negative for agitation, behavioral problems, confusion, decreased concentration, dysphoric mood, hallucinations, self-injury, sleep disturbance and suicidal ideas. The patient is not  "nervous/anxious and is not hyperactive.        Objective   Blood pressure 90/60, temperature 97.8 °F (36.6 °C), height 167.6 cm (65.98\"), weight 90.4 kg (199 lb 6.4 oz).  Body mass index is 32.2 kg/(m^2).    Physical Exam   Patient is alert and oriented, in no distress, not diaphoretic. Afebrile. His incision is clean, dry, and intact. The upper cm is now dry and clean as well, though slightly more erythematous than the rest of the incision. No drainage is able to be expressed.        Results from last 7 days  Lab Units 01/12/18  1205   CRP mg/dL 0.39       Results from last 7 days  Lab Units 01/12/18  1205   SED RATE mm/hr 11     Culture         Light growth (2+) Staphylococcus aureus (A)      Stain   Many (4+) WBCs seen      No organisms seen            [facility]   Susceptibility    Staphylococcus aureus     ANTOLIN     Ceftriaxone <=8 ug/ml Susceptible     Ciprofloxacin >2 ug/ml Resistant     Clindamycin <=0.5 ug/ml Susceptible     Daptomycin <=0.5 ug/ml Susceptible     Erythromycin <=0.5 ug/ml Susceptible     Gentamicin <=4 ug/ml Susceptible     Levofloxacin >4 ug/ml Resistant     Linezolid 2 ug/ml Susceptible     Oxacillin <=0.25 ug/ml Susceptible     Penicillin G <=0.03 ug/ml Susceptible     Quinupristin + Dalfopristin <=0.5 ug/ml Susceptible     Rifampin <=1 ug/ml Susceptible     Tetracycline <=4 ug/ml Susceptible     Trimethoprim + Sulfamethoxazole <=0.5/9.5 u... Susceptible     Vancomycin 1 ug/ml Susceptible                Assessment/Plan   Mr. Gordon is doing well.  He will complete his course of antibiotics as scheduled. We will see him next week for his regular follow up with xrays. He will call if he develops fever or increasing headache. He was given a refill of his pain medicine today (which he has been taking BID-TID)      Alvin was seen today for post-op.    Diagnoses and all orders for this visit:    S/P lumbar fusion    Draining postoperative wound, subsequent encounter    Other " orders  -     oxyCODONE-acetaminophen (PERCOCET) 7.5-325 MG per tablet; Take 1 tablet by mouth Every 4 (Four) Hours As Needed for Moderate Pain .      Return in about 1 week (around 1/24/2018).

## 2018-01-18 ENCOUNTER — HOSPITAL ENCOUNTER (OUTPATIENT)
Dept: GENERAL RADIOLOGY | Facility: HOSPITAL | Age: 38
Discharge: HOME | End: 2018-01-18
Admitting: PHYSICIAN ASSISTANT

## 2018-01-18 DIAGNOSIS — Z98.1 S/P LUMBAR FUSION: ICD-10-CM

## 2018-01-18 DIAGNOSIS — M43.06 PARS DEFECT OF LUMBAR SPINE: ICD-10-CM

## 2018-01-18 PROCEDURE — 72100 X-RAY EXAM L-S SPINE 2/3 VWS: CPT

## 2018-01-24 ENCOUNTER — OFFICE VISIT (OUTPATIENT)
Dept: NEUROSURGERY | Facility: CLINIC | Age: 38
End: 2018-01-24

## 2018-01-24 VITALS
TEMPERATURE: 98 F | SYSTOLIC BLOOD PRESSURE: 90 MMHG | HEIGHT: 66 IN | BODY MASS INDEX: 31.92 KG/M2 | WEIGHT: 198.6 LBS | DIASTOLIC BLOOD PRESSURE: 58 MMHG

## 2018-01-24 DIAGNOSIS — M43.06 PARS DEFECT OF LUMBAR SPINE: Primary | ICD-10-CM

## 2018-01-24 DIAGNOSIS — Z98.1 S/P LUMBAR FUSION: ICD-10-CM

## 2018-01-24 PROBLEM — T81.89XA DRAINING POSTOPERATIVE WOUND: Status: RESOLVED | Noted: 2018-01-12 | Resolved: 2018-01-24

## 2018-01-24 PROCEDURE — 99024 POSTOP FOLLOW-UP VISIT: CPT | Performed by: PHYSICIAN ASSISTANT

## 2018-01-24 NOTE — PROGRESS NOTES
"Subjective     Chief Complaint:  Alvin Gordon is a 37 y.o. male here for follow up after L5/s1 fusion on 12/12/17    History of Present Illness  Mr. Gordon is a 36yo male who presented with chronic low back pain with progressive, bilateral leg pain from spondylolisthesis.  Conservative treatment was ineffective, and on 12/12/17, he underwent a lumbar fusion at this level. A durotomy occurred during surgery and was repaired primarily. Incision was closed with nylon sutures.  At his first follow up appointment on 1/3/18, his incision was healing well and his sutures were removed. He developed a superficial stitch abscess at the top of his incision, however. Lab work was done which was normal, and cultures showed a superficial MSSA infection sensitive to Bactrim, which he had been given. He has finished this course now, and noted that over the weekend, the knot of his incision came out and he has had no more drainage or other problems from it since.   He has had headaches off and on postoperatively, but has had these before. They are not positional in nature, and he tends to get them \"about every other day\".  He has been doing more and more at home in anticipation of returning to work soon. He says that he gets some back pain if he does too much, but does not get leg pain unless he is up walking and working on things for an hour or two.     The following portions of the patient's history were reviewed and updated as appropriate: allergies, current medications, past family history, past medical history, past social history, past surgical history and problem list.    Family history:   Family History   Problem Relation Age of Onset   • No Known Problems Mother    • No Known Problems Father        Social history:   Social History     Social History   • Marital status:      Spouse name: N/A   • Number of children: N/A   • Years of education: N/A     Occupational History   • Not on file.     Social History Main " Topics   • Smoking status: Never Smoker   • Smokeless tobacco: Never Used   • Alcohol use Yes      Comment: occasionally   • Drug use: No   • Sexual activity: Defer     Other Topics Concern   • Not on file     Social History Narrative       Review of Systems   Constitutional: Negative for activity change, appetite change, chills, diaphoresis, fatigue, fever and unexpected weight change.   HENT: Negative for congestion, dental problem, drooling, ear discharge, ear pain, facial swelling, hearing loss, mouth sores, nosebleeds, postnasal drip, rhinorrhea, sinus pressure, sneezing, sore throat, tinnitus, trouble swallowing and voice change.    Eyes: Negative for photophobia, pain, discharge, redness, itching and visual disturbance.   Respiratory: Negative for apnea, cough, choking, chest tightness, shortness of breath, wheezing and stridor.    Cardiovascular: Negative for chest pain, palpitations and leg swelling.   Gastrointestinal: Negative for abdominal distention, abdominal pain, anal bleeding, blood in stool, constipation, diarrhea, nausea, rectal pain and vomiting.   Endocrine: Negative for cold intolerance, heat intolerance, polydipsia, polyphagia and polyuria.   Genitourinary: Negative for decreased urine volume, difficulty urinating, dysuria, enuresis, flank pain, frequency, genital sores, hematuria and urgency.   Musculoskeletal: Positive for arthralgias, back pain and myalgias. Negative for gait problem, joint swelling, neck pain and neck stiffness.   Skin: Negative for color change, pallor, rash and wound.   Allergic/Immunologic: Negative for environmental allergies, food allergies and immunocompromised state.   Neurological: Negative for dizziness, tremors, seizures, syncope, facial asymmetry, speech difficulty, weakness, light-headedness, numbness and headaches.   Hematological: Negative for adenopathy. Does not bruise/bleed easily.   Psychiatric/Behavioral: Negative for agitation, behavioral problems,  "confusion, decreased concentration, dysphoric mood, hallucinations, self-injury, sleep disturbance and suicidal ideas. The patient is not nervous/anxious and is not hyperactive.    All other systems reviewed and are negative.      Objective   Blood pressure 90/58, temperature 98 °F (36.7 °C), temperature source Temporal Artery , height 167.6 cm (65.98\"), weight 90.1 kg (198 lb 9.6 oz).  Body mass index is 32.07 kg/(m^2).    Physical Exam  Patient is alert and oriented, in no distress, not diaphoretic. Afebrile. His incision is clean, dry, and intact. It is now well closed with no drainage or swelling.      AP and lateral xrays of the lumbar spine were done today for review.   These show excellent placement of the fusion hardware.       . Assessment/Plan   Mr. Gordon is doing well. He is not in need of any refills today, though we will cover his lyrica refills for three months postop.  We have released him to return to work on 2/1/18 with no restrictions.  We will plan to see him back on an as-needed basis going forward.      Alvin was seen today for post-op.    Diagnoses and all orders for this visit:    Pars defect of lumbar spine    S/P lumbar fusion      Return if symptoms worsen or fail to improve.        "

## 2018-05-04 ENCOUNTER — OFFICE VISIT (OUTPATIENT)
Dept: FAMILY MEDICINE CLINIC | Facility: CLINIC | Age: 38
End: 2018-05-04

## 2018-05-04 VITALS
RESPIRATION RATE: 16 BRPM | HEART RATE: 112 BPM | HEIGHT: 66 IN | OXYGEN SATURATION: 98 % | TEMPERATURE: 97.9 F | SYSTOLIC BLOOD PRESSURE: 96 MMHG | BODY MASS INDEX: 32.3 KG/M2 | WEIGHT: 201 LBS | DIASTOLIC BLOOD PRESSURE: 80 MMHG

## 2018-05-04 DIAGNOSIS — Z13.6 ENCOUNTER FOR LIPID SCREENING FOR CARDIOVASCULAR DISEASE: ICD-10-CM

## 2018-05-04 DIAGNOSIS — E66.9 CLASS 1 OBESITY WITHOUT SERIOUS COMORBIDITY WITH BODY MASS INDEX (BMI) OF 32.0 TO 32.9 IN ADULT, UNSPECIFIED OBESITY TYPE: ICD-10-CM

## 2018-05-04 DIAGNOSIS — Z13.220 ENCOUNTER FOR LIPID SCREENING FOR CARDIOVASCULAR DISEASE: ICD-10-CM

## 2018-05-04 DIAGNOSIS — R63.5 WEIGHT GAIN: Primary | ICD-10-CM

## 2018-05-04 DIAGNOSIS — N52.9 ERECTILE DYSFUNCTION, UNSPECIFIED ERECTILE DYSFUNCTION TYPE: ICD-10-CM

## 2018-05-04 PROCEDURE — 99214 OFFICE O/P EST MOD 30 MIN: CPT | Performed by: FAMILY MEDICINE

## 2018-05-04 RX ORDER — SILDENAFIL 100 MG/1
50-100 TABLET, FILM COATED ORAL DAILY PRN
Qty: 6 TABLET | Refills: 5 | Status: SHIPPED | OUTPATIENT
Start: 2018-05-04 | End: 2019-05-17

## 2018-05-04 RX ORDER — PHENTERMINE HYDROCHLORIDE 37.5 MG/1
37.5 TABLET ORAL
Qty: 30 TABLET | Refills: 0 | Status: SHIPPED | OUTPATIENT
Start: 2018-05-04 | End: 2019-05-17

## 2018-05-04 NOTE — PROGRESS NOTES
Assessment/Plan       Problems Addressed this Visit     None      Visit Diagnoses     Weight gain    -  Primary    Relevant Medications    phentermine (ADIPEX-P) 37.5 MG tablet    Other Relevant Orders    CBC & Differential    Comprehensive Metabolic Panel    Lipid Panel With / Chol / HDL Ratio    TSH    Erectile dysfunction, unspecified erectile dysfunction type        Relevant Medications    sildenafil (VIAGRA) 100 MG tablet    Other Relevant Orders    CBC & Differential    Comprehensive Metabolic Panel    TSH    Encounter for lipid screening for cardiovascular disease        Relevant Orders    Comprehensive Metabolic Panel    Lipid Panel With / Chol / HDL Ratio    Class 1 obesity without serious comorbidity with body mass index (BMI) of 32.0 to 32.9 in adult, unspecified obesity type        Relevant Medications    phentermine (ADIPEX-P) 37.5 MG tablet            Follow up: Return in about 1 month (around 6/4/2018), or if symptoms worsen or fail to improve.     DISCUSSION  Weight gain.  Obesity.  Check labs as noted.  Discussed options for weight loss medications and he would like to try phentermine.    I discussed with the patient about options for weight loss including phentermine. Side effects of phentermine including increased heart rate, arrhythmia, heart valve disorder, elevated blood pressure inside of lungs or pulmonary hypertension, abuse and dependence was all explained. I also explained that it was expected a follow-up would be in one month after starting the medication to be sure weight loss is occurring. Patient expressed understanding.     Follow-up and recheck in one month.    Erectile dysfunction.  Trial of Viagra 100 mg one half to one as needed.  #6.  Side effects explained.    Further plan once we have labs back.      MEDICATIONS PRESCRIBED  Requested Prescriptions     Signed Prescriptions Disp Refills   • phentermine (ADIPEX-P) 37.5 MG tablet 30 tablet 0     Sig: Take 1 tablet by mouth Every  "Morning Before Breakfast.   • sildenafil (VIAGRA) 100 MG tablet 6 tablet 5     Sig: Take 0.5-1 tablets by mouth Daily As Needed for erectile dysfunction.            Camron dated on 5/4/2018   was reviewed and appropriate.        -------------------------------------------    Subjective     Chief Complaint   Patient presents with   • ED issues   • discuss weight loss meds         History of Present Illness    ED  X 6-8 months  Issues keeping erection  Will lose erection during intercourse  Does not wake up with them much  + sexual desire  Worries if going to happen again    Meds: no meds  Tob: none  Alcohol : occ, every other night, not daily  No drugs    No increased stress  Relationship going well    Sleeping ok. Wakes up tired. 6-7 hrs of sleep  ---------------------------------    Weight Gain    Exercising not helping  Diet: Drinks: soda and tea water. Reg pepsi ( 1-2 20 oz per day), occ sweet tea.     No increased snacks    No weight loss meds in the past    Back issues better.         ----------------------------------    Back much better        Past Medical History,Medications, Allergies, and social history was reviewed.      Review of Systems   Constitutional: Positive for unexpected weight gain. Negative for fatigue.   HENT: Negative.    Respiratory: Negative.    Cardiovascular: Negative.    Gastrointestinal: Negative.    Genitourinary: Negative for dysuria and frequency.   Psychiatric/Behavioral: Negative.        Objective     Vitals:    05/04/18 1558   BP: 96/80   Pulse: 112   Resp: 16   Temp: 97.9 °F (36.6 °C)   TempSrc: Temporal Artery    SpO2: 98%   Weight: 91.2 kg (201 lb)   Height: 167.6 cm (65.98\")          Physical Exam   Constitutional: He is oriented to person, place, and time. He appears well-developed and well-nourished.   HENT:   Head: Normocephalic and atraumatic.   Right Ear: External ear normal.   Left Ear: External ear normal.   Eyes: EOM are normal. Pupils are equal, round, and reactive to " light.   Cardiovascular: Normal rate, regular rhythm and normal heart sounds.    Pulmonary/Chest: Effort normal and breath sounds normal. No respiratory distress. He has no wheezes. He has no rales.   Neurological: He is alert and oriented to person, place, and time.   Skin: Skin is warm and dry.   Psychiatric: He has a normal mood and affect. His behavior is normal.   Nursing note and vitals reviewed.              Sanjay New MD

## 2018-05-05 LAB
ALBUMIN SERPL-MCNC: 3.9 G/DL (ref 3.2–4.8)
ALBUMIN/GLOB SERPL: 2 G/DL (ref 1.5–2.5)
ALP SERPL-CCNC: 59 U/L (ref 25–100)
ALT SERPL-CCNC: 49 U/L (ref 7–40)
AST SERPL-CCNC: 31 U/L (ref 0–33)
BASOPHILS # BLD AUTO: 0.05 10*3/MM3 (ref 0–0.2)
BASOPHILS NFR BLD AUTO: 0.5 % (ref 0–1)
BILIRUB SERPL-MCNC: 0.6 MG/DL (ref 0.3–1.2)
BUN SERPL-MCNC: 16 MG/DL (ref 9–23)
BUN/CREAT SERPL: 10.7 (ref 7–25)
CALCIUM SERPL-MCNC: 8.6 MG/DL (ref 8.7–10.4)
CHLORIDE SERPL-SCNC: 104 MMOL/L (ref 99–109)
CHOLEST SERPL-MCNC: 189 MG/DL (ref 0–200)
CHOLEST/HDLC SERPL: 5.56 {RATIO}
CO2 SERPL-SCNC: 28 MMOL/L (ref 20–31)
CREAT SERPL-MCNC: 1.5 MG/DL (ref 0.6–1.3)
EOSINOPHIL # BLD AUTO: 0.12 10*3/MM3 (ref 0–0.3)
EOSINOPHIL NFR BLD AUTO: 1.2 % (ref 0–3)
ERYTHROCYTE [DISTWIDTH] IN BLOOD BY AUTOMATED COUNT: 13.4 % (ref 11.3–14.5)
GFR SERPLBLD CREATININE-BSD FMLA CKD-EPI: 53 ML/MIN/1.73
GFR SERPLBLD CREATININE-BSD FMLA CKD-EPI: 64 ML/MIN/1.73
GLOBULIN SER CALC-MCNC: 2 GM/DL
GLUCOSE SERPL-MCNC: 124 MG/DL (ref 70–100)
HCT VFR BLD AUTO: 50.3 % (ref 38.9–50.9)
HDLC SERPL-MCNC: 34 MG/DL (ref 40–60)
HGB BLD-MCNC: 18 G/DL (ref 13.1–17.5)
IMM GRANULOCYTES # BLD: 0.04 10*3/MM3 (ref 0–0.03)
IMM GRANULOCYTES NFR BLD: 0.4 % (ref 0–0.6)
LDLC SERPL CALC-MCNC: 90 MG/DL (ref 0–100)
LYMPHOCYTES # BLD AUTO: 3.3 10*3/MM3 (ref 0.6–4.8)
LYMPHOCYTES NFR BLD AUTO: 32.8 % (ref 24–44)
MCH RBC QN AUTO: 30.6 PG (ref 27–31)
MCHC RBC AUTO-ENTMCNC: 35.8 G/DL (ref 32–36)
MCV RBC AUTO: 85.4 FL (ref 80–99)
MONOCYTES # BLD AUTO: 0.62 10*3/MM3 (ref 0–1)
MONOCYTES NFR BLD AUTO: 6.2 % (ref 0–12)
NEUTROPHILS # BLD AUTO: 5.94 10*3/MM3 (ref 1.5–8.3)
NEUTROPHILS NFR BLD AUTO: 58.9 % (ref 41–71)
PLATELET # BLD AUTO: 266 10*3/MM3 (ref 150–450)
POTASSIUM SERPL-SCNC: 4.1 MMOL/L (ref 3.5–5.5)
PROT SERPL-MCNC: 5.9 G/DL (ref 5.7–8.2)
RBC # BLD AUTO: 5.89 10*6/MM3 (ref 4.2–5.76)
SODIUM SERPL-SCNC: 139 MMOL/L (ref 132–146)
TRIGL SERPL-MCNC: 327 MG/DL (ref 0–150)
TSH SERPL DL<=0.005 MIU/L-ACNC: 1.42 MIU/ML (ref 0.35–5.35)
VLDLC SERPL CALC-MCNC: 65.4 MG/DL
WBC # BLD AUTO: 10.07 10*3/MM3 (ref 3.5–10.8)

## 2018-05-07 DIAGNOSIS — N28.9 RENAL INSUFFICIENCY: ICD-10-CM

## 2018-05-07 DIAGNOSIS — D75.1 POLYCYTHEMIA: Primary | ICD-10-CM

## 2018-05-21 ENCOUNTER — RESULTS ENCOUNTER (OUTPATIENT)
Dept: FAMILY MEDICINE CLINIC | Facility: CLINIC | Age: 38
End: 2018-05-21

## 2018-05-21 DIAGNOSIS — N28.9 RENAL INSUFFICIENCY: ICD-10-CM

## 2018-05-21 DIAGNOSIS — D75.1 POLYCYTHEMIA: ICD-10-CM

## 2019-05-17 ENCOUNTER — OFFICE VISIT (OUTPATIENT)
Dept: FAMILY MEDICINE CLINIC | Facility: CLINIC | Age: 39
End: 2019-05-17

## 2019-05-17 VITALS
BODY MASS INDEX: 30.04 KG/M2 | RESPIRATION RATE: 18 BRPM | HEART RATE: 78 BPM | TEMPERATURE: 99 F | SYSTOLIC BLOOD PRESSURE: 128 MMHG | WEIGHT: 186 LBS | DIASTOLIC BLOOD PRESSURE: 86 MMHG

## 2019-05-17 DIAGNOSIS — R53.83 OTHER FATIGUE: Primary | ICD-10-CM

## 2019-05-17 DIAGNOSIS — Z20.2 EXPOSURE TO STD: ICD-10-CM

## 2019-05-17 DIAGNOSIS — R68.82 DECREASED LIBIDO: ICD-10-CM

## 2019-05-17 PROCEDURE — 99214 OFFICE O/P EST MOD 30 MIN: CPT | Performed by: FAMILY MEDICINE

## 2019-05-17 NOTE — PROGRESS NOTES
Assessment/Plan       Problems Addressed this Visit     None      Visit Diagnoses     Other fatigue    -  Primary    Relevant Orders    CBC & Differential    Comprehensive Metabolic Panel    TSH    Testosterone    Vitamin B12    Decreased libido        Relevant Orders    Testosterone    Exposure to STD        Relevant Orders    HIV-1/O/2 Ag/Ab w Reflex    Hepatitis panel, acute    RPR    Chlamydia trachomatis, Neisseria gonorrhoeae, Trichomonas vaginalis, PCR - Urine, Urine, Clean Catch            Follow up: Return if symptoms worsen or fail to improve, for follow up depends on review of labs and testing.     DISCUSSION  Fatigue with decreased libido.  Check labs as noted.  Evaluate for hypergonadism, hypothyroidism and B12 deficiency.  Also evaluate for possible anemia.    Exposure to STD.  Check labs as noted.      MEDICATIONS PRESCRIBED  Requested Prescriptions      No prescriptions requested or ordered in this encounter            -------------------------------------------    Subjective     Chief Complaint   Patient presents with   • Fatigue     not sleeping well, discuss Testosterone          Fatigue   This is a recurrent problem. The current episode started more than 1 month ago. The problem occurs daily. The problem has been unchanged. Associated symptoms include fatigue. Pertinent negatives include no arthralgias, chest pain, congestion, coughing, fever, headaches, nausea or vomiting. Nothing aggravates the symptoms. He has tried nothing for the symptoms. The treatment provided no relief.       Fatigue  Sx x 1-2 years  Decreased sexual desire  Sleep : toss and turn a lot. Wake up tired. + snoring. No witnessed apnea  + decreased erection. Unable to keep it always  Does not wake up with erection      Took a T booster OTC and helped      Possible exposure to STD  Increased freq urination at times  No penile discharge  No dysuria          Social History     Tobacco Use   Smoking Status Never Smoker    Smokeless Tobacco Never Used        Past Medical History,Medications, Allergies, and social history was reviewed.      Review of Systems   Constitutional: Positive for fatigue. Negative for fever.   HENT: Negative.  Negative for congestion.    Respiratory: Negative.  Negative for cough.    Cardiovascular: Negative.  Negative for chest pain.   Gastrointestinal: Negative.  Negative for nausea and vomiting.   Musculoskeletal: Negative.  Negative for arthralgias.   Neurological: Negative.    Psychiatric/Behavioral: Negative.        Objective     Vitals:    05/17/19 1536   BP: 128/86   BP Location: Left arm   Patient Position: Sitting   Cuff Size: Adult   Pulse: 78   Resp: 18   Temp: 99 °F (37.2 °C)   TempSrc: Temporal   Weight: 84.4 kg (186 lb)          Physical Exam   Constitutional: Vital signs are normal. He appears well-developed and well-nourished.   HENT:   Head: Normocephalic and atraumatic.   Right Ear: Hearing, tympanic membrane, external ear and ear canal normal.   Left Ear: Hearing, tympanic membrane, external ear and ear canal normal.   Mouth/Throat: Oropharynx is clear and moist.   Eyes: Conjunctivae, EOM and lids are normal. Pupils are equal, round, and reactive to light.   Neck: Normal range of motion. Neck supple. No thyromegaly present.   Cardiovascular: Normal rate, regular rhythm and normal heart sounds. Exam reveals no friction rub.   No murmur heard.  Pulmonary/Chest: Effort normal and breath sounds normal. No respiratory distress. He has no wheezes. He has no rales.   Abdominal: Soft. Normal appearance and bowel sounds are normal. He exhibits no distension and no mass. There is no tenderness. There is no rebound and no guarding.   Musculoskeletal: He exhibits no edema.   Neurological: He is alert. He has normal strength.   Skin: Skin is warm and dry.   Psychiatric: He has a normal mood and affect. His speech is normal. Cognition and memory are normal.   Nursing note and vitals reviewed.                 Sanjay New MD

## 2019-05-21 LAB
ALBUMIN SERPL-MCNC: 5 G/DL (ref 3.5–5.5)
ALBUMIN/GLOB SERPL: 2.2 {RATIO} (ref 1.2–2.2)
ALP SERPL-CCNC: 61 IU/L (ref 39–117)
ALT SERPL-CCNC: 25 IU/L (ref 0–44)
AST SERPL-CCNC: 23 IU/L (ref 0–40)
BASOPHILS # BLD AUTO: 0.1 X10E3/UL (ref 0–0.2)
BASOPHILS NFR BLD AUTO: 1 %
BILIRUB SERPL-MCNC: 0.7 MG/DL (ref 0–1.2)
BUN SERPL-MCNC: 18 MG/DL (ref 6–20)
BUN/CREAT SERPL: 12 (ref 9–20)
C TRACH RRNA SPEC QL NAA+PROBE: NEGATIVE
CALCIUM SERPL-MCNC: 9.4 MG/DL (ref 8.7–10.2)
CHLORIDE SERPL-SCNC: 102 MMOL/L (ref 96–106)
CO2 SERPL-SCNC: 22 MMOL/L (ref 20–29)
CREAT SERPL-MCNC: 1.5 MG/DL (ref 0.76–1.27)
EOSINOPHIL # BLD AUTO: 0.1 X10E3/UL (ref 0–0.4)
EOSINOPHIL NFR BLD AUTO: 2 %
ERYTHROCYTE [DISTWIDTH] IN BLOOD BY AUTOMATED COUNT: 14.4 % (ref 12.3–15.4)
GLOBULIN SER CALC-MCNC: 2.3 G/DL (ref 1.5–4.5)
GLUCOSE SERPL-MCNC: 88 MG/DL (ref 65–99)
HAV IGM SERPL QL IA: NEGATIVE
HBV CORE IGM SERPL QL IA: NEGATIVE
HBV SURFACE AG SERPL QL IA: NEGATIVE
HCT VFR BLD AUTO: 45.4 % (ref 37.5–51)
HCV AB S/CO SERPL IA: <0.1 S/CO RATIO (ref 0–0.9)
HGB BLD-MCNC: 16.2 G/DL (ref 13–17.7)
HIV 1+2 AB+HIV1 P24 AG SERPL QL IA: NON REACTIVE
IMM GRANULOCYTES # BLD AUTO: 0 X10E3/UL (ref 0–0.1)
IMM GRANULOCYTES NFR BLD AUTO: 0 %
LYMPHOCYTES # BLD AUTO: 2.8 X10E3/UL (ref 0.7–3.1)
LYMPHOCYTES NFR BLD AUTO: 36 %
MCH RBC QN AUTO: 31.6 PG (ref 26.6–33)
MCHC RBC AUTO-ENTMCNC: 35.7 G/DL (ref 31.5–35.7)
MCV RBC AUTO: 89 FL (ref 79–97)
MONOCYTES # BLD AUTO: 0.6 X10E3/UL (ref 0.1–0.9)
MONOCYTES NFR BLD AUTO: 7 %
N GONORRHOEA RRNA SPEC QL NAA+PROBE: NEGATIVE
NEUTROPHILS # BLD AUTO: 4.2 X10E3/UL (ref 1.4–7)
NEUTROPHILS NFR BLD AUTO: 54 %
PLATELET # BLD AUTO: 253 X10E3/UL (ref 150–379)
POTASSIUM SERPL-SCNC: 4 MMOL/L (ref 3.5–5.2)
PROT SERPL-MCNC: 7.3 G/DL (ref 6–8.5)
RBC # BLD AUTO: 5.12 X10E6/UL (ref 4.14–5.8)
RPR SER QL: NON REACTIVE
SODIUM SERPL-SCNC: 140 MMOL/L (ref 134–144)
T VAGINALIS RRNA VAG QL NAA+PROBE: NEGATIVE
TESTOST SERPL-MCNC: 386 NG/DL (ref 264–916)
TSH SERPL DL<=0.005 MIU/L-ACNC: 1.75 UIU/ML (ref 0.45–4.5)
VIT B12 SERPL-MCNC: 1132 PG/ML (ref 232–1245)
WBC # BLD AUTO: 7.7 X10E3/UL (ref 3.4–10.8)

## 2019-10-31 ENCOUNTER — OFFICE VISIT (OUTPATIENT)
Dept: FAMILY MEDICINE CLINIC | Facility: CLINIC | Age: 39
End: 2019-10-31

## 2019-10-31 VITALS
SYSTOLIC BLOOD PRESSURE: 110 MMHG | RESPIRATION RATE: 18 BRPM | DIASTOLIC BLOOD PRESSURE: 62 MMHG | HEIGHT: 66 IN | TEMPERATURE: 98.7 F | HEART RATE: 70 BPM | WEIGHT: 188 LBS | BODY MASS INDEX: 30.22 KG/M2

## 2019-10-31 DIAGNOSIS — M25.561 ACUTE PAIN OF RIGHT KNEE: Primary | ICD-10-CM

## 2019-10-31 PROCEDURE — 99213 OFFICE O/P EST LOW 20 MIN: CPT | Performed by: FAMILY MEDICINE

## 2019-10-31 RX ORDER — NAPROXEN 500 MG/1
500 TABLET ORAL 2 TIMES DAILY WITH MEALS
Qty: 40 TABLET | Refills: 1 | Status: SHIPPED | OUTPATIENT
Start: 2019-10-31 | End: 2021-03-15

## 2019-10-31 NOTE — PROGRESS NOTES
Assessment/Plan       Problems Addressed this Visit     None      Visit Diagnoses     Acute pain of right knee    -  Primary    Relevant Medications    naproxen (NAPROSYN) 500 MG tablet            Follow up: Return if symptoms worsen or fail to improve.     DISCUSSION  Right knee pain.  Suspect some irritation under the patella or inflammation of medial ligament.  Trial of naproxen.  Rest.  Ice.  Call or follow-up if not improving.  X-ray if not getting better.      MEDICATIONS PRESCRIBED  Requested Prescriptions     Signed Prescriptions Disp Refills   • naproxen (NAPROSYN) 500 MG tablet 40 tablet 1     Sig: Take 1 tablet by mouth 2 (Two) Times a Day With Meals.              -------------------------------------------    Subjective     Chief Complaint   Patient presents with   • Knee Pain     right few weeks          Knee Pain    Incident onset: x 2 weeks, stiffer in am and increased walking causes more pain . No issues with knee in the past. There was no injury mechanism. The pain is present in the right knee. The quality of the pain is described as aching (sharp under the knee cap. pain on inside of knee). Pertinent negatives include no inability to bear weight or loss of motion. Associated symptoms comments: Has not given out,  + pop feeling, no dislocated, no swelling. The symptoms are aggravated by movement and weight bearing (and steps). He has tried NSAIDs and acetaminophen for the symptoms. The treatment provided mild (tylenol helps better. ) relief.             Social History     Tobacco Use   Smoking Status Never Smoker   Smokeless Tobacco Never Used          Past Medical History,Medications, Allergies, and social history was reviewed.               Review of Systems   Constitutional: Negative.    HENT: Negative.    Respiratory: Negative.    Musculoskeletal: Positive for arthralgias.       Objective     Vitals:    10/31/19 1402   BP: 110/62   Pulse: 70   Resp: 18   Temp: 98.7 °F (37.1 °C)   Weight: 85.3  "kg (188 lb)   Height: 167.6 cm (66\")          Physical Exam   Constitutional: He is oriented to person, place, and time. He appears well-developed and well-nourished.   HENT:   Head: Normocephalic and atraumatic.   Right Ear: External ear normal.   Left Ear: External ear normal.   Eyes: EOM are normal. Pupils are equal, round, and reactive to light.   Pulmonary/Chest: Effort normal.   Musculoskeletal:        Right knee: He exhibits normal range of motion, no swelling, no effusion, no erythema, normal alignment, no LCL laxity, normal patellar mobility, no bony tenderness, normal meniscus and no MCL laxity. Tenderness found. Medial joint line tenderness noted. No lateral joint line and no patellar tendon tenderness noted.   Knee cap clicks with flexion and extension   Neurological: He is alert and oriented to person, place, and time.   Skin: Skin is warm and dry.   Psychiatric: He has a normal mood and affect. His behavior is normal.   Nursing note and vitals reviewed.                Sanjay New MD    "

## 2020-10-21 NOTE — TELEPHONE ENCOUNTER
Due for blood pressure check from initiating medication and for physical - please call patient to schedule physical LVM FOR RETURN CALL, OFFICE HRS AND NUMBER GIVEN.

## 2021-03-15 ENCOUNTER — OFFICE VISIT (OUTPATIENT)
Dept: FAMILY MEDICINE CLINIC | Facility: CLINIC | Age: 41
End: 2021-03-15

## 2021-03-15 VITALS
WEIGHT: 184 LBS | DIASTOLIC BLOOD PRESSURE: 70 MMHG | HEIGHT: 66 IN | SYSTOLIC BLOOD PRESSURE: 106 MMHG | TEMPERATURE: 98.4 F | RESPIRATION RATE: 18 BRPM | BODY MASS INDEX: 29.57 KG/M2 | HEART RATE: 72 BPM

## 2021-03-15 DIAGNOSIS — Z30.09 VASECTOMY EVALUATION: ICD-10-CM

## 2021-03-15 DIAGNOSIS — R25.3 MUSCLE TWITCHING: Primary | ICD-10-CM

## 2021-03-15 PROCEDURE — 99213 OFFICE O/P EST LOW 20 MIN: CPT | Performed by: FAMILY MEDICINE

## 2021-03-15 NOTE — PROGRESS NOTES
Assessment/Plan       Diagnoses and all orders for this visit:    1. Muscle twitching (Primary)  -     EMG & Nerve Conduction Test; Future  -     CBC & Differential  -     Comprehensive Metabolic Panel  -     TSH  -     Vitamin B12  -     Magnesium    2. Vasectomy evaluation  -     Ambulatory Referral to Urology           Follow up: Return if symptoms worsen or fail to improve, for follow up depends on review of labs and testing.     DISCUSSION  Muscle twitching.  Unclear etiology.  Check labs including nerve conduction study.  CBC, CMP, TSH, B12 and magnesium level.  Further plan once testing back.    Vasectomy evaluation.  Refer to urology.          MEDICATIONS PRESCRIBED  Requested Prescriptions      No prescriptions requested or ordered in this encounter            -------------------------------------------    Subjective     Chief Complaint   Patient presents with   • Leg Pain         History of Present Illness    Leg pain   Pain and quiver  Pain in shins  + ache   Worse at night  Tingling feeling   Twitches during the day  Just below the knee  Has been going on for 6 months    No FH of muscle issues like this    No labs since May 2019    No issues swallowing  No vision loss or issues. Wears contacts. Recent eye exam 12/2020    Takes 45 mg elemental iron     Also takes GILMA men for the last 6 weeks and a different one before that. Has Tumeric    No tongue or face twitching    =======================  3 children biological  No further children desired  7 children between him and his wife    Wants vasectomy                      Social History     Tobacco Use   Smoking Status Never Smoker   Smokeless Tobacco Never Used          Past Medical History,Medications, Allergies, and social history was reviewed.          Review of Systems   Constitutional: Negative.    HENT: Negative.    Respiratory: Negative.    Cardiovascular: Negative.    Gastrointestinal: Negative.    Musculoskeletal: Negative for arthralgias.  "  Psychiatric/Behavioral: Negative.        Objective     Vitals:    03/15/21 1500   BP: 106/70   Pulse: 72   Resp: 18   Temp: 98.4 °F (36.9 °C)   Weight: 83.5 kg (184 lb)   Height: 167.6 cm (66\")          Physical Exam  Vitals and nursing note reviewed.   Constitutional:       Appearance: He is well-developed.   HENT:      Head: Normocephalic and atraumatic.      Right Ear: External ear normal.      Left Ear: External ear normal.   Eyes:      Pupils: Pupils are equal, round, and reactive to light.   Cardiovascular:      Rate and Rhythm: Normal rate and regular rhythm.      Heart sounds: Normal heart sounds.   Pulmonary:      Effort: Pulmonary effort is normal. No respiratory distress.      Breath sounds: Normal breath sounds. No wheezing or rales.   Musculoskeletal:      Comments: No obvious twitching today.  Shows a video today of muscle twitching at home.   Skin:     General: Skin is warm and dry.   Neurological:      Mental Status: He is alert and oriented to person, place, and time.   Psychiatric:         Behavior: Behavior normal.                     Sanjay New MD    "

## 2021-03-16 LAB
ALBUMIN SERPL-MCNC: 4.8 G/DL (ref 4–5)
ALBUMIN/GLOB SERPL: 1.9 {RATIO} (ref 1.2–2.2)
ALP SERPL-CCNC: 61 IU/L (ref 39–117)
ALT SERPL-CCNC: 23 IU/L (ref 0–44)
AST SERPL-CCNC: 29 IU/L (ref 0–40)
BASOPHILS # BLD AUTO: 0 X10E3/UL (ref 0–0.2)
BASOPHILS NFR BLD AUTO: 1 %
BILIRUB SERPL-MCNC: 0.5 MG/DL (ref 0–1.2)
BUN SERPL-MCNC: 14 MG/DL (ref 6–24)
BUN/CREAT SERPL: 9 (ref 9–20)
CALCIUM SERPL-MCNC: 9.8 MG/DL (ref 8.7–10.2)
CHLORIDE SERPL-SCNC: 101 MMOL/L (ref 96–106)
CO2 SERPL-SCNC: 22 MMOL/L (ref 20–29)
CREAT SERPL-MCNC: 1.55 MG/DL (ref 0.76–1.27)
EOSINOPHIL # BLD AUTO: 0.1 X10E3/UL (ref 0–0.4)
EOSINOPHIL NFR BLD AUTO: 2 %
ERYTHROCYTE [DISTWIDTH] IN BLOOD BY AUTOMATED COUNT: 13 % (ref 11.6–15.4)
GLOBULIN SER CALC-MCNC: 2.5 G/DL (ref 1.5–4.5)
GLUCOSE SERPL-MCNC: 80 MG/DL (ref 65–99)
HCT VFR BLD AUTO: 46.4 % (ref 37.5–51)
HGB BLD-MCNC: 15.9 G/DL (ref 13–17.7)
IMM GRANULOCYTES # BLD AUTO: 0 X10E3/UL (ref 0–0.1)
IMM GRANULOCYTES NFR BLD AUTO: 0 %
LYMPHOCYTES # BLD AUTO: 2.2 X10E3/UL (ref 0.7–3.1)
LYMPHOCYTES NFR BLD AUTO: 40 %
MAGNESIUM SERPL-MCNC: 2.3 MG/DL (ref 1.6–2.3)
MCH RBC QN AUTO: 31.9 PG (ref 26.6–33)
MCHC RBC AUTO-ENTMCNC: 34.3 G/DL (ref 31.5–35.7)
MCV RBC AUTO: 93 FL (ref 79–97)
MONOCYTES # BLD AUTO: 0.4 X10E3/UL (ref 0.1–0.9)
MONOCYTES NFR BLD AUTO: 8 %
NEUTROPHILS # BLD AUTO: 2.7 X10E3/UL (ref 1.4–7)
NEUTROPHILS NFR BLD AUTO: 49 %
PLATELET # BLD AUTO: 212 X10E3/UL (ref 150–450)
POTASSIUM SERPL-SCNC: 3.9 MMOL/L (ref 3.5–5.2)
PROT SERPL-MCNC: 7.3 G/DL (ref 6–8.5)
RBC # BLD AUTO: 4.99 X10E6/UL (ref 4.14–5.8)
SODIUM SERPL-SCNC: 140 MMOL/L (ref 134–144)
TSH SERPL DL<=0.005 MIU/L-ACNC: 2.45 UIU/ML (ref 0.45–4.5)
VIT B12 SERPL-MCNC: 1118 PG/ML (ref 232–1245)
WBC # BLD AUTO: 5.5 X10E3/UL (ref 3.4–10.8)

## 2021-10-07 ENCOUNTER — OFFICE VISIT (OUTPATIENT)
Dept: FAMILY MEDICINE CLINIC | Facility: CLINIC | Age: 41
End: 2021-10-07

## 2021-10-07 VITALS
DIASTOLIC BLOOD PRESSURE: 78 MMHG | HEIGHT: 66 IN | TEMPERATURE: 98.7 F | SYSTOLIC BLOOD PRESSURE: 116 MMHG | HEART RATE: 78 BPM | RESPIRATION RATE: 18 BRPM | WEIGHT: 203 LBS | BODY MASS INDEX: 32.62 KG/M2

## 2021-10-07 DIAGNOSIS — R22.9 MASS OF SKIN: ICD-10-CM

## 2021-10-07 DIAGNOSIS — K21.9 GASTROESOPHAGEAL REFLUX DISEASE, UNSPECIFIED WHETHER ESOPHAGITIS PRESENT: Primary | ICD-10-CM

## 2021-10-07 PROCEDURE — 99214 OFFICE O/P EST MOD 30 MIN: CPT | Performed by: FAMILY MEDICINE

## 2021-10-07 RX ORDER — OMEPRAZOLE 40 MG/1
40 CAPSULE, DELAYED RELEASE ORAL DAILY
Qty: 30 CAPSULE | Refills: 2 | Status: SHIPPED | OUTPATIENT
Start: 2021-10-07

## 2021-10-07 NOTE — PROGRESS NOTES
"Chief Complaint  Neck Pain (spot on neck ) and Heartburn    Subjective          Alvin Gordon presents to [facility]  Neck Pain   This is a new problem. Episode onset: 2-3 weeks. The problem occurs intermittently. The problem has been waxing and waning. The pain is associated with nothing. The pain is present in the midline. The pain is mild. Nothing aggravates the symptoms. Pertinent negatives include no numbness. Associated symptoms comments: Knot in the middle of the neck  . He has tried NSAIDs for the symptoms. The treatment provided moderate relief.   Heartburn  He complains of belching (occ), heartburn and nausea (at times). He reports no abdominal pain or no dysphagia. This is a recurrent problem. The current episode started more than 1 month ago (2-3 months ago). The problem has been gradually worsening. Exacerbated by: any food and drink. water ok. Risk factors include NSAIDs (occ caffeine. occ energy drink. increased use at times, occ alchol). He has tried an antacid (tums help) for the symptoms. The treatment provided mild relief.         Review of Systems   Gastrointestinal: Positive for nausea (at times). Negative for abdominal pain and dysphagia.   Musculoskeletal: Positive for neck pain.   Neurological: Negative for numbness.        Objective       Vital Signs:   /78   Pulse 78   Temp 98.7 °F (37.1 °C)   Resp 18   Ht 167.6 cm (66\")   Wt 92.1 kg (203 lb)   BMI 32.77 kg/m²     Physical Exam  Vitals and nursing note reviewed.   Constitutional:       General: He is not in acute distress.     Appearance: Normal appearance. He is well-developed. He is not ill-appearing.   HENT:      Head: Normocephalic and atraumatic.      Right Ear: Hearing, tympanic membrane, ear canal and external ear normal.      Left Ear: Hearing, tympanic membrane, ear canal and external ear normal.      Nose: Nose normal. No congestion or rhinorrhea.      Mouth/Throat:      Mouth: " Mucous membranes are moist.      Pharynx: No oropharyngeal exudate or posterior oropharyngeal erythema.   Eyes:      General: Lids are normal.      Conjunctiva/sclera: Conjunctivae normal.      Pupils: Pupils are equal, round, and reactive to light.   Neck:      Thyroid: No thyromegaly.   Cardiovascular:      Rate and Rhythm: Normal rate and regular rhythm.      Heart sounds: Normal heart sounds. No murmur heard.   No friction rub.   Pulmonary:      Effort: Pulmonary effort is normal. No respiratory distress.      Breath sounds: Normal breath sounds. No wheezing or rales.   Abdominal:      General: Bowel sounds are normal. There is no distension.      Palpations: Abdomen is soft. There is no mass.      Tenderness: There is no abdominal tenderness. There is no guarding or rebound.   Musculoskeletal:      Cervical back: Normal range of motion and neck supple. No tenderness or bony tenderness. Normal range of motion.      Right lower leg: No edema.      Left lower leg: No edema.   Skin:     General: Skin is warm and dry.   Neurological:      General: No focal deficit present.      Mental Status: He is alert.   Psychiatric:         Mood and Affect: Mood normal.         Speech: Speech normal.         Behavior: Behavior normal.        Right side of the lower cervical area is a 2 to 3 cm mobile mass consistent with lipoma or cyst.  Nontender.  Not red or hot.    Result Review :                     Assessment and Plan    Diagnoses and all orders for this visit:    1. Gastroesophageal reflux disease, unspecified whether esophagitis present (Primary)  -     omeprazole (priLOSEC) 40 MG capsule; Take 1 capsule by mouth Daily.  Dispense: 30 capsule; Refill: 2    2. Mass of skin  -     Ambulatory Referral to General Surgery          DISCUSSION    Gastroesophageal reflux disease.  Trial of omeprazole 40 mg 1 daily 30 minutes before meal.  Call in 2 weeks with update and if not improving, refer to GI.  If improving, then take  medication for a total of 12 weeks and call if symptoms return after that.  Decrease caffeine, decrease eating late at night.    Mass of skin of neck.  Refer to general surgery for evaluation.          Follow Up   Return if symptoms worsen or fail to improve.    Patient was given instructions and counseling regarding his condition or for health maintenance advice. Please see specific information pulled into the AVS if appropriate.       Sanjay New MD

## 2025-07-09 ENCOUNTER — HOSPITAL ENCOUNTER (OUTPATIENT)
Dept: GENERAL RADIOLOGY | Facility: HOSPITAL | Age: 45
Discharge: HOME | End: 2025-07-09
Payer: COMMERCIAL

## 2025-07-09 ENCOUNTER — CONSULT (OUTPATIENT)
Dept: ONCOLOGY | Facility: CLINIC | Age: 45
End: 2025-07-09
Payer: COMMERCIAL

## 2025-07-09 ENCOUNTER — PREP FOR SURGERY (OUTPATIENT)
Dept: OTHER | Facility: HOSPITAL | Age: 45
End: 2025-07-09
Payer: COMMERCIAL

## 2025-07-09 ENCOUNTER — LAB (OUTPATIENT)
Dept: LAB | Facility: HOSPITAL | Age: 45
End: 2025-07-09
Payer: COMMERCIAL

## 2025-07-09 VITALS
OXYGEN SATURATION: 98 % | DIASTOLIC BLOOD PRESSURE: 82 MMHG | WEIGHT: 179 LBS | BODY MASS INDEX: 28.77 KG/M2 | TEMPERATURE: 98.2 F | RESPIRATION RATE: 18 BRPM | HEIGHT: 66 IN | HEART RATE: 72 BPM | SYSTOLIC BLOOD PRESSURE: 142 MMHG

## 2025-07-09 DIAGNOSIS — R80.3 FREE MONOCLONAL LIGHT CHAIN: ICD-10-CM

## 2025-07-09 DIAGNOSIS — N18.30 STAGE 3 CHRONIC KIDNEY DISEASE, UNSPECIFIED WHETHER STAGE 3A OR 3B CKD: ICD-10-CM

## 2025-07-09 DIAGNOSIS — N18.30 STAGE 3 CHRONIC KIDNEY DISEASE, UNSPECIFIED WHETHER STAGE 3A OR 3B CKD: Primary | ICD-10-CM

## 2025-07-09 DIAGNOSIS — D47.2 MONOCLONAL GAMMOPATHY OF RENAL SIGNIFICANCE (MGRS): Primary | ICD-10-CM

## 2025-07-09 DIAGNOSIS — N29 MONOCLONAL GAMMOPATHY OF RENAL SIGNIFICANCE (MGRS): Primary | ICD-10-CM

## 2025-07-09 LAB
ALBUMIN SERPL-MCNC: 5 G/DL (ref 3.5–5.2)
ALBUMIN/GLOB SERPL: 2.3 G/DL
ALP SERPL-CCNC: 82 U/L (ref 39–117)
ALT SERPL W P-5'-P-CCNC: 20 U/L (ref 1–41)
ANION GAP SERPL CALCULATED.3IONS-SCNC: 10 MMOL/L (ref 5–15)
AST SERPL-CCNC: 24 U/L (ref 1–40)
B2 MICROGLOB SERPL-MCNC: 6.4 MG/L (ref 0.8–2.2)
BASOPHILS # BLD AUTO: 0.02 10*3/MM3 (ref 0–0.2)
BASOPHILS NFR BLD AUTO: 0.3 % (ref 0–1.5)
BILIRUB SERPL-MCNC: 0.4 MG/DL (ref 0–1.2)
BUN SERPL-MCNC: 6.7 MG/DL (ref 6–20)
BUN/CREAT SERPL: 3.7 (ref 7–25)
CALCIUM SPEC-SCNC: 9.2 MG/DL (ref 8.6–10.5)
CHLORIDE SERPL-SCNC: 107 MMOL/L (ref 98–107)
CO2 SERPL-SCNC: 25 MMOL/L (ref 22–29)
CREAT SERPL-MCNC: 1.83 MG/DL (ref 0.76–1.27)
DEPRECATED RDW RBC AUTO: 50.1 FL (ref 37–54)
EGFRCR SERPLBLD CKD-EPI 2021: 46.1 ML/MIN/1.73
EOSINOPHIL # BLD AUTO: 0.1 10*3/MM3 (ref 0–0.4)
EOSINOPHIL NFR BLD AUTO: 1.6 % (ref 0.3–6.2)
ERYTHROCYTE [DISTWIDTH] IN BLOOD BY AUTOMATED COUNT: 13.6 % (ref 12.3–15.4)
GLOBULIN UR ELPH-MCNC: 2.2 GM/DL
GLUCOSE SERPL-MCNC: 95 MG/DL (ref 65–99)
HCT VFR BLD AUTO: 36.6 % (ref 37.5–51)
HGB BLD-MCNC: 12.9 G/DL (ref 13–17.7)
IMM GRANULOCYTES # BLD AUTO: 0.13 10*3/MM3 (ref 0–0.05)
IMM GRANULOCYTES NFR BLD AUTO: 2.1 % (ref 0–0.5)
LYMPHOCYTES # BLD AUTO: 2.29 10*3/MM3 (ref 0.7–3.1)
LYMPHOCYTES NFR BLD AUTO: 36.2 % (ref 19.6–45.3)
MCH RBC QN AUTO: 35.1 PG (ref 26.6–33)
MCHC RBC AUTO-ENTMCNC: 35.2 G/DL (ref 31.5–35.7)
MCV RBC AUTO: 99.7 FL (ref 79–97)
MONOCYTES # BLD AUTO: 0.53 10*3/MM3 (ref 0.1–0.9)
MONOCYTES NFR BLD AUTO: 8.4 % (ref 5–12)
NEUTROPHILS NFR BLD AUTO: 3.26 10*3/MM3 (ref 1.7–7)
NEUTROPHILS NFR BLD AUTO: 51.4 % (ref 42.7–76)
PLATELET # BLD AUTO: 211 10*3/MM3 (ref 140–450)
PMV BLD AUTO: 9.3 FL (ref 6–12)
POTASSIUM SERPL-SCNC: 4 MMOL/L (ref 3.5–5.2)
PROT SERPL-MCNC: 7.2 G/DL (ref 6–8.5)
RBC # BLD AUTO: 3.67 10*6/MM3 (ref 4.14–5.8)
SODIUM SERPL-SCNC: 142 MMOL/L (ref 136–145)
WBC NRBC COR # BLD AUTO: 6.33 10*3/MM3 (ref 3.4–10.8)

## 2025-07-09 PROCEDURE — 83521 IG LIGHT CHAINS FREE EACH: CPT

## 2025-07-09 PROCEDURE — 82232 ASSAY OF BETA-2 PROTEIN: CPT

## 2025-07-09 PROCEDURE — 77075 RADEX OSSEOUS SURVEY COMPL: CPT

## 2025-07-09 PROCEDURE — 82784 ASSAY IGA/IGD/IGG/IGM EACH: CPT

## 2025-07-09 PROCEDURE — 80053 COMPREHEN METABOLIC PANEL: CPT

## 2025-07-09 PROCEDURE — 86334 IMMUNOFIX E-PHORESIS SERUM: CPT

## 2025-07-09 PROCEDURE — 99204 OFFICE O/P NEW MOD 45 MIN: CPT | Performed by: INTERNAL MEDICINE

## 2025-07-09 PROCEDURE — 84165 PROTEIN E-PHORESIS SERUM: CPT

## 2025-07-09 PROCEDURE — 85025 COMPLETE CBC W/AUTO DIFF WBC: CPT

## 2025-07-09 PROCEDURE — 36415 COLL VENOUS BLD VENIPUNCTURE: CPT

## 2025-07-09 NOTE — LETTER
2025     Santino Castro MD  [address]  [address]  [facility] [address]    Patient: Alvin Gordon   YOB: 1980   Date of Visit: 2025     Dear Santino Castro MD:       Thank you for referring Alvin Gordon to me for evaluation. Below are the relevant portions of my assessment and plan of care.    If you have questions, please do not hesitate to call me. I look forward to following Alvin along with you.         Sincerely,        Seda Fritz MD        CC: MD Lam Cisneros Amie E, MD  [phone]  Sign when Signing Visit    Hematology and Oncology [place]  Office number [phone]    Fax number [phone]     New Patient Office Visit      Date: 2025     Patient Name: Alvin Gordon  MRN: [id number]  : 1980    Referring Physician: Dr. Santino Castro MD    Chief Complaint: Proteinuria with abnormal renal biopsy    History of Present Illness: Alvin Gordon is a pleasant 44 y.o. male who presents today for evaluation of proteinuria with renal biopsy showing possible free light chain deposition.     Was seen at clinic at his work and on baseline labs, was found to have creatinine elevation. Per his report, nephrologist obtained prior labs showing this was of gradual onset over several years. Workup revealed proteinuria and he ultimately went for renal biopsy showing:       He was referred for further evaluation of possible MGRS/myeloma. He is otherwise feeling well. No persistent new areas of pain,     Review of Systems:   I have reviewed the review of systems completed by the patient. This is negative for clinically significant symptoms except as noted below. This document has been scanned into the patient's chart:   +fatigue, +cloudy urine  Chronic back pain attributed to prior spinal fusion    Past Medical History:   Past Medical History:   Diagnosis Date   • Anxiety    • Back pain    • Herpes simplex    • Migraine headache    • Slow  "to wake up after anesthesia    • Wears contact lenses        Past Surgical History:   Past Surgical History:   Procedure Laterality Date   • LUMBAR DISCECTOMY FUSION INSTRUMENTATION N/A 12/12/2017    Procedure: LUMBAR LAMINECTOMY, POSTERIOR LUMBAR INTERBODY FUSION L5-S1;  Surgeon: Kevin Jacinto MD;  Location: [facility];  Service:    • SHOULDER ARTHROSCOPY Right        Family History:   Family History   Problem Relation Age of Onset   • No Known Problems Mother    • No Known Problems Father    Grandmother colon cancer    Social History:   Social History     Socioeconomic History   • Marital status:    Tobacco Use   • Smoking status: Never   • Smokeless tobacco: Never   Substance and Sexual Activity   • Alcohol use: Yes     Comment: occasionally   • Drug use: No   • Sexual activity: Defer   Works at [employer]    Medications:     Current Outpatient Medications:   •  omeprazole (priLOSEC) 40 MG capsule, Take 1 capsule by mouth Daily., Disp: 30 capsule, Rfl: 2    Allergies:   No Known Allergies    Objective     Vital Signs:   Vitals:    07/09/25 1251   BP: 142/82   Pulse: 72   Resp: 18   Temp: 98.2 °F (36.8 °C)   TempSrc: Infrared   SpO2: 98%   Weight: 81.2 kg (179 lb)   Height: 167.6 cm (66\")  Comment: per pt   PainSc: 0-No pain    Body mass index is 28.89 kg/m².   Pain Score    07/09/25 1251   PainSc: 0-No pain       ECOG Performance Status: 0 - Asymptomatic    Physical Exam:   General: No acute distress. Well appearing   HEENT: Normocephalic, atraumatic. Sclera anicteric.   Neck: supple, no adenopathy.   Cardiovascular: regular rate and rhythm. No murmurs.   Respiratory: Normal rate. Clear to auscultation bilaterally  Abdomen: Soft, nontender, non distended with normoactive bowel sounds  Lymph: no cervical, supraclavicular or axillary adenopathy  Neuro: Alert and oriented x 3. No focal deficits.   Ext: Symmetric, no swelling.     Laboratory/Imaging Reviewed:   No visits with results within 2 Week(s) " from this visit.   Latest known visit with results is:   Office Visit on 03/15/2021   Component Date Value Ref Range Status   • WBC 03/15/2021 5.5  3.4 - 10.8 x10E3/uL Final   • RBC 03/15/2021 4.99  4.14 - 5.80 x10E6/uL Final   • Hemoglobin 03/15/2021 15.9  13.0 - 17.7 g/dL Final   • Hematocrit 03/15/2021 46.4  37.5 - 51.0 % Final   • MCV 03/15/2021 93  79 - 97 fL Final   • MCH 03/15/2021 31.9  26.6 - 33.0 pg Final   • MCHC 03/15/2021 34.3  31.5 - 35.7 g/dL Final   • RDW 03/15/2021 13.0  11.6 - 15.4 % Final   • Platelets 03/15/2021 212  150 - 450 x10E3/uL Final   • Neutrophil Rel % 03/15/2021 49  Not Estab. % Final   • Lymphocyte Rel % 03/15/2021 40  Not Estab. % Final   • Monocyte Rel % 03/15/2021 8  Not Estab. % Final   • Eosinophil Rel % 03/15/2021 2  Not Estab. % Final   • Basophil Rel % 03/15/2021 1  Not Estab. % Final   • Neutrophils Absolute 03/15/2021 2.7  1.4 - 7.0 x10E3/uL Final   • Lymphocytes Absolute 03/15/2021 2.2  0.7 - 3.1 x10E3/uL Final   • Monocytes Absolute 03/15/2021 0.4  0.1 - 0.9 x10E3/uL Final   • Eosinophils Absolute 03/15/2021 0.1  0.0 - 0.4 x10E3/uL Final   • Basophils Absolute 03/15/2021 0.0  0.0 - 0.2 x10E3/uL Final   • Immature Granulocyte Rel % 03/15/2021 0  Not Estab. % Final   • Immature Grans Absolute 03/15/2021 0.0  0.0 - 0.1 x10E3/uL Final   • Glucose 03/15/2021 80  65 - 99 mg/dL Final   • BUN 03/15/2021 14  6 - 24 mg/dL Final   • Creatinine 03/15/2021 1.55 (H)  0.76 - 1.27 mg/dL Final   • eGFR Non  Am 03/15/2021 55 (L)  >59 mL/min/1.73 Final   • eGFR African Am 03/15/2021 64  >59 mL/min/1.73 Final   • BUN/Creatinine Ratio 03/15/2021 9  9 - 20 Final   • Sodium 03/15/2021 140  134 - 144 mmol/L Final   • Potassium 03/15/2021 3.9  3.5 - 5.2 mmol/L Final   • Chloride 03/15/2021 101  96 - 106 mmol/L Final   • Total CO2 03/15/2021 22  20 - 29 mmol/L Final   • Calcium 03/15/2021 9.8  8.7 - 10.2 mg/dL Final   • Total Protein 03/15/2021 7.3  6.0 - 8.5 g/dL Final   • Albumin  03/15/2021 4.8  4.0 - 5.0 g/dL Final   • Globulin 03/15/2021 2.5  1.5 - 4.5 g/dL Final   • A/G Ratio 03/15/2021 1.9  1.2 - 2.2 Final   • Total Bilirubin 03/15/2021 0.5  0.0 - 1.2 mg/dL Final   • Alkaline Phosphatase 03/15/2021 61  39 - 117 IU/L Final   • AST (SGOT) 03/15/2021 29  0 - 40 IU/L Final   • ALT (SGPT) 03/15/2021 23  0 - 44 IU/L Final   • TSH 03/15/2021 2.450  0.450 - 4.500 uIU/mL Final   • Vitamin B-12 03/15/2021 1,118  232 - 1,245 pg/mL Final   • Magnesium 03/15/2021 2.3  1.6 - 2.3 mg/dL Final       CT BIOPSY SITE KIDNEY  Result Date: 6/18/2025  Narrative: CT GUIDED RENAL BIOPSY. HISTORY: Renal failure. ATTENDING RADIOLOGIST: Dr. Soto. PHYSICIAN ASSISTANT: Ayanna Cordero PA-C. PROCEDURE: After informed consent was obtained and a time-out was performed, the patient was prepped and draped in the usual sterile fashion over the left flank. The inferior pole of the left kidney was targeted for biopsy. Utilizing local anesthesia and sterile technique with a coaxial system, access to the lesion was obtained. Two 18-gauge core biopsies were obtained. Postbiopsy films demonstrate minimal perinephric hematoma. The patient received conscious sedation. The patient tolerated the procedure well and left the department in good condition. PROCEDURAL SEDATION: 2 mg of IV Versed and 50 mcg of Fentanyl were administered. Continuous vital sign monitoring was used. An RN was present during the sedation process. Overall sedation time was 30 minutes.    Impression: Status post CT guided biopsy of left kidney without immediate complication. Images reviewed, interpreted, and dictated by Dr. Brad Soto. Transcribed by Ayanna Gordon PA-C.      Procedures    Assessment / Plan      Assessment/Plan:     1. Stage 3 chronic kidney disease, unspecified whether stage 3a or 3b CKD (Primary)  2. 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   -We discussed the possibility of MGRS/myeloma.   -We discussed obtaining blood and urine testing and bone survey:  -     CBC & Differential; Future  -     Comprehensive Metabolic Panel; Future  -     Beta 2 Microglobulin, Serum; Future  -     Immunofixation (KATHERINE), Protein Electrophoresis (PE), and Quantitative Free Kappa and Lambda Light Chains (FLC), Serum; Future  -     KATHERINE+Protein Electro, 24-Hr Urine - Urine, Clean Catch; Future  -     Kappa / Lambda Light Chains, Urine, 24 Hour - Urine, Clean Catch; Future  -     XR Bone Survey Complete; Future    -If abnormal free light chains confirmed in blood or urine, will proceed with bone marrow biopsy as our next step. He is out of town next week. We tentatively arranged in office follow up with bone marrow biopsy afterwards. However, afterward were able to obtain copies of outside labs confirming elevated urine free light chains.   -Messaged RN to contact patient and offer bone marrow biopsy the week of 7/21 when he returns from vacation    Follow Up:   With biopsy results     Seda Fritz MD  Hematology and Oncology

## 2025-07-09 NOTE — PROGRESS NOTES
Hematology and Oncology [place]  Office number [phone]    Fax number [phone]     New Patient Office Visit      Date: 2025     Patient Name: Alvin Gordon  MRN: [id number]  : 1980    Referring Physician: Dr. Santino Castro MD    Chief Complaint: Proteinuria with abnormal renal biopsy    History of Present Illness: Alvin Gordon is a pleasant 44 y.o. male who presents today for evaluation of proteinuria with renal biopsy showing possible free light chain deposition.     Was seen at clinic at his work and on baseline labs, was found to have creatinine elevation. Per his report, nephrologist obtained prior labs showing this was of gradual onset over several years. Workup revealed proteinuria and he ultimately went for renal biopsy showing:       He was referred for further evaluation of possible MGRS/myeloma. He is otherwise feeling well. No persistent new areas of pain,     Review of Systems:   I have reviewed the review of systems completed by the patient. This is negative for clinically significant symptoms except as noted below. This document has been scanned into the patient's chart:   +fatigue, +cloudy urine  Chronic back pain attributed to prior spinal fusion    Past Medical History:   Past Medical History:   Diagnosis Date    Anxiety     Back pain     Herpes simplex     Migraine headache     Slow to wake up after anesthesia     Wears contact lenses        Past Surgical History:   Past Surgical History:   Procedure Laterality Date    LUMBAR DISCECTOMY FUSION INSTRUMENTATION N/A 2017    Procedure: LUMBAR LAMINECTOMY, POSTERIOR LUMBAR INTERBODY FUSION L5-S1;  Surgeon: Kevin Jacinto MD;  Location: [facility];  Service:     SHOULDER ARTHROSCOPY Right        Family History:   Family History   Problem Relation Age of Onset    No Known Problems Mother     No Known Problems Father    Grandmother colon cancer    Social History:   Social History     Socioeconomic History     "Marital status:    Tobacco Use    Smoking status: Never    Smokeless tobacco: Never   Substance and Sexual Activity    Alcohol use: Yes     Comment: occasionally    Drug use: No    Sexual activity: Defer   Works at [employer]    Medications:     Current Outpatient Medications:     omeprazole (priLOSEC) 40 MG capsule, Take 1 capsule by mouth Daily., Disp: 30 capsule, Rfl: 2    Allergies:   No Known Allergies    Objective     Vital Signs:   Vitals:    07/09/25 1251   BP: 142/82   Pulse: 72   Resp: 18   Temp: 98.2 °F (36.8 °C)   TempSrc: Infrared   SpO2: 98%   Weight: 81.2 kg (179 lb)   Height: 167.6 cm (66\")  Comment: per pt   PainSc: 0-No pain    Body mass index is 28.89 kg/m².   Pain Score    07/09/25 1251   PainSc: 0-No pain       ECOG Performance Status: 0 - Asymptomatic    Physical Exam:   General: No acute distress. Well appearing   HEENT: Normocephalic, atraumatic. Sclera anicteric.   Neck: supple, no adenopathy.   Cardiovascular: regular rate and rhythm. No murmurs.   Respiratory: Normal rate. Clear to auscultation bilaterally  Abdomen: Soft, nontender, non distended with normoactive bowel sounds  Lymph: no cervical, supraclavicular or axillary adenopathy  Neuro: Alert and oriented x 3. No focal deficits.   Ext: Symmetric, no swelling.     Laboratory/Imaging Reviewed:   No visits with results within 2 Week(s) from this visit.   Latest known visit with results is:   Office Visit on 03/15/2021   Component Date Value Ref Range Status    WBC 03/15/2021 5.5  3.4 - 10.8 x10E3/uL Final    RBC 03/15/2021 4.99  4.14 - 5.80 x10E6/uL Final    Hemoglobin 03/15/2021 15.9  13.0 - 17.7 g/dL Final    Hematocrit 03/15/2021 46.4  37.5 - 51.0 % Final    MCV 03/15/2021 93  79 - 97 fL Final    MCH 03/15/2021 31.9  26.6 - 33.0 pg Final    MCHC 03/15/2021 34.3  31.5 - 35.7 g/dL Final    RDW 03/15/2021 13.0  11.6 - 15.4 % Final    Platelets 03/15/2021 212  150 - 450 x10E3/uL Final    Neutrophil Rel % 03/15/2021 49  Not Estab. % " Final    Lymphocyte Rel % 03/15/2021 40  Not Estab. % Final    Monocyte Rel % 03/15/2021 8  Not Estab. % Final    Eosinophil Rel % 03/15/2021 2  Not Estab. % Final    Basophil Rel % 03/15/2021 1  Not Estab. % Final    Neutrophils Absolute 03/15/2021 2.7  1.4 - 7.0 x10E3/uL Final    Lymphocytes Absolute 03/15/2021 2.2  0.7 - 3.1 x10E3/uL Final    Monocytes Absolute 03/15/2021 0.4  0.1 - 0.9 x10E3/uL Final    Eosinophils Absolute 03/15/2021 0.1  0.0 - 0.4 x10E3/uL Final    Basophils Absolute 03/15/2021 0.0  0.0 - 0.2 x10E3/uL Final    Immature Granulocyte Rel % 03/15/2021 0  Not Estab. % Final    Immature Grans Absolute 03/15/2021 0.0  0.0 - 0.1 x10E3/uL Final    Glucose 03/15/2021 80  65 - 99 mg/dL Final    BUN 03/15/2021 14  6 - 24 mg/dL Final    Creatinine 03/15/2021 1.55 (H)  0.76 - 1.27 mg/dL Final    eGFR Non  Am 03/15/2021 55 (L)  >59 mL/min/1.73 Final    eGFR African Am 03/15/2021 64  >59 mL/min/1.73 Final    BUN/Creatinine Ratio 03/15/2021 9  9 - 20 Final    Sodium 03/15/2021 140  134 - 144 mmol/L Final    Potassium 03/15/2021 3.9  3.5 - 5.2 mmol/L Final    Chloride 03/15/2021 101  96 - 106 mmol/L Final    Total CO2 03/15/2021 22  20 - 29 mmol/L Final    Calcium 03/15/2021 9.8  8.7 - 10.2 mg/dL Final    Total Protein 03/15/2021 7.3  6.0 - 8.5 g/dL Final    Albumin 03/15/2021 4.8  4.0 - 5.0 g/dL Final    Globulin 03/15/2021 2.5  1.5 - 4.5 g/dL Final    A/G Ratio 03/15/2021 1.9  1.2 - 2.2 Final    Total Bilirubin 03/15/2021 0.5  0.0 - 1.2 mg/dL Final    Alkaline Phosphatase 03/15/2021 61  39 - 117 IU/L Final    AST (SGOT) 03/15/2021 29  0 - 40 IU/L Final    ALT (SGPT) 03/15/2021 23  0 - 44 IU/L Final    TSH 03/15/2021 2.450  0.450 - 4.500 uIU/mL Final    Vitamin B-12 03/15/2021 1,118  232 - 1,245 pg/mL Final    Magnesium 03/15/2021 2.3  1.6 - 2.3 mg/dL Final       CT BIOPSY SITE KIDNEY  Result Date: 6/18/2025  Narrative: CT GUIDED RENAL BIOPSY. HISTORY: Renal failure. ATTENDING RADIOLOGIST: Dr. Soto.  PHYSICIAN ASSISTANT: Ayanna Cordero PA-C. PROCEDURE: After informed consent was obtained and a time-out was performed, the patient was prepped and draped in the usual sterile fashion over the left flank. The inferior pole of the left kidney was targeted for biopsy. Utilizing local anesthesia and sterile technique with a coaxial system, access to the lesion was obtained. Two 18-gauge core biopsies were obtained. Postbiopsy films demonstrate minimal perinephric hematoma. The patient received conscious sedation. The patient tolerated the procedure well and left the department in good condition. PROCEDURAL SEDATION: 2 mg of IV Versed and 50 mcg of Fentanyl were administered. Continuous vital sign monitoring was used. An RN was present during the sedation process. Overall sedation time was 30 minutes.    Impression: Status post CT guided biopsy of left kidney without immediate complication. Images reviewed, interpreted, and dictated by Dr. Brad Soto. Transcribed by Ayanna Gordon PA-C.      Procedures    Assessment / Plan      Assessment/Plan:     1. Stage 3 chronic kidney disease, unspecified whether stage 3a or 3b CKD (Primary)  2. Abnormal renal biopsy with suggestion of free light chain deposition  -I reviewed his renal biopsy and available lab results  -Will try to obtain additional labs ordered last week.   -We discussed the possibility of MGRS/myeloma.   -We discussed obtaining blood and urine testing and bone survey:  -     CBC & Differential; Future  -     Comprehensive Metabolic Panel; Future  -     Beta 2 Microglobulin, Serum; Future  -     Immunofixation (KATHERINE), Protein Electrophoresis (PE), and Quantitative Free Kappa and Lambda Light Chains (FLC), Serum; Future  -     KATHERINE+Protein Electro, 24-Hr Urine - Urine, Clean Catch; Future  -     Kappa / Lambda Light Chains, Urine, 24 Hour - Urine, Clean Catch; Future  -     XR Bone Survey Complete; Future    -If abnormal free light chains confirmed in  blood or urine, will proceed with bone marrow biopsy as our next step. He is out of town next week. We tentatively arranged in office follow up with bone marrow biopsy afterwards. However, afterward were able to obtain copies of outside labs confirming elevated urine free light chains.   -Messaged RN to contact patient and offer bone marrow biopsy the week of 7/21 when he returns from vacation    Follow Up:   With biopsy results     Seda Fritz MD  Hematology and Oncology

## 2025-07-11 ENCOUNTER — LAB (OUTPATIENT)
Dept: LAB | Facility: HOSPITAL | Age: 45
End: 2025-07-11
Payer: COMMERCIAL

## 2025-07-11 ENCOUNTER — TELEPHONE (OUTPATIENT)
Dept: ONCOLOGY | Facility: CLINIC | Age: 45
End: 2025-07-11
Payer: COMMERCIAL

## 2025-07-11 DIAGNOSIS — R80.3 FREE MONOCLONAL LIGHT CHAIN: ICD-10-CM

## 2025-07-11 DIAGNOSIS — N18.30 STAGE 3 CHRONIC KIDNEY DISEASE, UNSPECIFIED WHETHER STAGE 3A OR 3B CKD: ICD-10-CM

## 2025-07-11 PROCEDURE — 81050 URINALYSIS VOLUME MEASURE: CPT

## 2025-07-11 PROCEDURE — 86335 IMMUNFIX E-PHORSIS/URINE/CSF: CPT

## 2025-07-11 PROCEDURE — 83521 IG LIGHT CHAINS FREE EACH: CPT

## 2025-07-11 PROCEDURE — 84166 PROTEIN E-PHORESIS/URINE/CSF: CPT

## 2025-07-11 PROCEDURE — 84156 ASSAY OF PROTEIN URINE: CPT

## 2025-07-11 NOTE — TELEPHONE ENCOUNTER
Advised patient per Dr. Fritz.  Patient verbalized understanding and agreed with plan recommended by MD.  Patient stated he turned in the 24 hour urine this morning.  Message sent to IR scheduling to get patient scheduled and contacted with the appointment date/time and instructions.

## 2025-07-11 NOTE — TELEPHONE ENCOUNTER
"----- Message from Seda Fritz sent at 7/11/2025  2:25 PM EDT -----  Regarding: Please call patient:  Closing his chart, making sure you saw chat from this AM and had contacted patient and IR. There were not many slots left on Thursday for 7/21, want to make sure we get scheduled if possible, thanks    \"Please call this patient and let him know: I was able to get the outside labs from nephrology from last week which does show light chain protein in his urine. I still would like him to turn in the 24 hour urine to better quantify, but with this result I do recommend proceeding with bone marrow biopsy as our next step as we had discussed. Please see if he is amenable to scheduling this on his return from vacation. If he is ok with that, please tag kamla again and see if there is a spot available for biopsy the week of 7/21. He can see me 1 week after with results.\"  "

## 2025-07-14 LAB
ALBUMIN SERPL ELPH-MCNC: 4.4 G/DL (ref 2.9–4.4)
ALBUMIN/GLOB SERPL: 1.9 {RATIO} (ref 0.7–1.7)
ALPHA1 GLOB SERPL ELPH-MCNC: 0.2 G/DL (ref 0–0.4)
ALPHA2 GLOB SERPL ELPH-MCNC: 0.6 G/DL (ref 0.4–1)
B-GLOBULIN SERPL ELPH-MCNC: 1.1 G/DL (ref 0.7–1.3)
GAMMA GLOB SERPL ELPH-MCNC: 0.5 G/DL (ref 0.4–1.8)
GLOBULIN SER-MCNC: 2.4 G/DL (ref 2.2–3.9)
IGA SERPL-MCNC: 37 MG/DL (ref 90–386)
IGG SERPL-MCNC: 608 MG/DL (ref 603–1613)
IGM SERPL-MCNC: 6 MG/DL (ref 20–172)
INTERPRETATION SERPL IEP-IMP: ABNORMAL
KAPPA LC FREE SER-MCNC: ABNORMAL MG/L (ref 3.3–19.4)
KAPPA LC FREE/LAMBDA FREE SER: 6297.96 {RATIO} (ref 0.26–1.65)
LABORATORY COMMENT REPORT: ABNORMAL
LAMBDA LC FREE SERPL-MCNC: 2.7 MG/L (ref 5.7–26.3)
M PROTEIN SERPL ELPH-MCNC: ABNORMAL G/DL
PROT SERPL-MCNC: 6.8 G/DL (ref 6–8.5)

## 2025-07-15 LAB
ALBUMIN 24H MFR UR ELPH: 5.8 %
ALPHA1 GLOB 24H MFR UR ELPH: 1 %
ALPHA2 GLOB 24H MFR UR ELPH: 9.1 %
B-GLOBULIN MFR UR ELPH: 80.1 %
GAMMA GLOB 24H MFR UR ELPH: 4 %
INTERPRETATION UR IFE-IMP: ABNORMAL
KAPPA LC FREE 24H UR-MRATE: ABNORMAL MG/24 HR
KAPPA LC FREE UR-MCNC: ABNORMAL MG/L (ref 1.17–86.46)
KAPPA LC FREE/LAMBDA FREE UR: 4039.19 (ref 1.83–14.26)
LAMBDA LC FREE 24H UR-MRATE: 43.04 MG/24 HR
LAMBDA LC FREE UR-MCNC: 13.45 MG/L (ref 0.27–15.21)
M PROTEIN 24H MFR UR ELPH: 72 %
M PROTEIN 24H UR ELPH-MRATE: 9725 MG/24 HR
PROT 24H UR-MRATE: ABNORMAL MG/24 HR (ref 30–150)
PROT UR-MCNC: 422.1 MG/DL
SERVICE CMNT-IMP: ABNORMAL

## 2025-07-17 ENCOUNTER — TELEPHONE (OUTPATIENT)
Dept: INFUSION THERAPY | Facility: HOSPITAL | Age: 45
End: 2025-07-17
Payer: COMMERCIAL

## 2025-07-17 NOTE — TELEPHONE ENCOUNTER
Pt contacted as pre-procedure phone call prior to planned BMB for 7/21/25. Reviewed with patient arrival time, location, nothing to eat or drink by mouth,  needed, reviewed procedure instructions and allowed time for questions, and reviewed home medications, allergies, and medical history.

## 2025-07-21 ENCOUNTER — HOSPITAL ENCOUNTER (OUTPATIENT)
Dept: CT IMAGING | Facility: HOSPITAL | Age: 45
Discharge: HOME | End: 2025-07-21
Admitting: INTERNAL MEDICINE
Payer: COMMERCIAL

## 2025-07-21 VITALS
HEART RATE: 75 BPM | DIASTOLIC BLOOD PRESSURE: 87 MMHG | SYSTOLIC BLOOD PRESSURE: 121 MMHG | RESPIRATION RATE: 18 BRPM | BODY MASS INDEX: 30.53 KG/M2 | TEMPERATURE: 97.2 F | OXYGEN SATURATION: 96 % | WEIGHT: 190 LBS | HEIGHT: 66 IN

## 2025-07-21 DIAGNOSIS — N29 MONOCLONAL GAMMOPATHY OF RENAL SIGNIFICANCE (MGRS): ICD-10-CM

## 2025-07-21 DIAGNOSIS — D47.2 MONOCLONAL GAMMOPATHY OF RENAL SIGNIFICANCE (MGRS): ICD-10-CM

## 2025-07-21 LAB
BASOPHILS # BLD AUTO: 0.02 10*3/MM3 (ref 0–0.2)
BASOPHILS NFR BLD AUTO: 0.4 % (ref 0–1.5)
DEPRECATED RDW RBC AUTO: 52.2 FL (ref 37–54)
EOSINOPHIL # BLD AUTO: 0.12 10*3/MM3 (ref 0–0.4)
EOSINOPHIL NFR BLD AUTO: 2.3 % (ref 0.3–6.2)
ERYTHROCYTE [DISTWIDTH] IN BLOOD BY AUTOMATED COUNT: 14.5 % (ref 12.3–15.4)
HCT VFR BLD AUTO: 34.5 % (ref 37.5–51)
HGB BLD-MCNC: 12 G/DL (ref 13–17.7)
IMM GRANULOCYTES # BLD AUTO: 0.17 10*3/MM3 (ref 0–0.05)
IMM GRANULOCYTES NFR BLD AUTO: 3.2 % (ref 0–0.5)
INR PPP: 0.86 (ref 0.89–1.12)
LYMPHOCYTES # BLD AUTO: 1.97 10*3/MM3 (ref 0.7–3.1)
LYMPHOCYTES NFR BLD AUTO: 37.5 % (ref 19.6–45.3)
MCH RBC QN AUTO: 35.3 PG (ref 26.6–33)
MCHC RBC AUTO-ENTMCNC: 34.8 G/DL (ref 31.5–35.7)
MCV RBC AUTO: 101.5 FL (ref 79–97)
MONOCYTES # BLD AUTO: 0.44 10*3/MM3 (ref 0.1–0.9)
MONOCYTES NFR BLD AUTO: 8.4 % (ref 5–12)
NEUTROPHILS NFR BLD AUTO: 2.54 10*3/MM3 (ref 1.7–7)
NEUTROPHILS NFR BLD AUTO: 48.2 % (ref 42.7–76)
NRBC BLD AUTO-RTO: 0 /100 WBC (ref 0–0.2)
PLATELET # BLD AUTO: 187 10*3/MM3 (ref 140–450)
PMV BLD AUTO: 9.4 FL (ref 6–12)
PROTHROMBIN TIME: 12.3 SECONDS (ref 12.2–15.3)
RBC # BLD AUTO: 3.4 10*6/MM3 (ref 4.14–5.8)
WBC NRBC COR # BLD AUTO: 5.26 10*3/MM3 (ref 3.4–10.8)

## 2025-07-21 PROCEDURE — 25010000002 LIDOCAINE 1 % SOLUTION: Performed by: INTERNAL MEDICINE

## 2025-07-21 PROCEDURE — 85025 COMPLETE CBC W/AUTO DIFF WBC: CPT | Performed by: STUDENT IN AN ORGANIZED HEALTH CARE EDUCATION/TRAINING PROGRAM

## 2025-07-21 PROCEDURE — 85610 PROTHROMBIN TIME: CPT | Performed by: STUDENT IN AN ORGANIZED HEALTH CARE EDUCATION/TRAINING PROGRAM

## 2025-07-21 PROCEDURE — 88313 SPECIAL STAINS GROUP 2: CPT | Performed by: INTERNAL MEDICINE

## 2025-07-21 PROCEDURE — 25010000002 MIDAZOLAM PER 1 MG: Performed by: STUDENT IN AN ORGANIZED HEALTH CARE EDUCATION/TRAINING PROGRAM

## 2025-07-21 PROCEDURE — 88305 TISSUE EXAM BY PATHOLOGIST: CPT | Performed by: INTERNAL MEDICINE

## 2025-07-21 PROCEDURE — 77012 CT SCAN FOR NEEDLE BIOPSY: CPT

## 2025-07-21 PROCEDURE — 88360 TUMOR IMMUNOHISTOCHEM/MANUAL: CPT | Performed by: INTERNAL MEDICINE

## 2025-07-21 PROCEDURE — 88311 DECALCIFY TISSUE: CPT | Performed by: INTERNAL MEDICINE

## 2025-07-21 PROCEDURE — 25010000002 FENTANYL CITRATE (PF) 50 MCG/ML SOLUTION: Performed by: STUDENT IN AN ORGANIZED HEALTH CARE EDUCATION/TRAINING PROGRAM

## 2025-07-21 RX ORDER — MIDAZOLAM HYDROCHLORIDE 1 MG/ML
INJECTION, SOLUTION INTRAMUSCULAR; INTRAVENOUS
Status: DISPENSED
Start: 2025-07-21 | End: 2025-07-21

## 2025-07-21 RX ORDER — SODIUM CHLORIDE 0.9 % (FLUSH) 0.9 %
10 SYRINGE (ML) INJECTION EVERY 12 HOURS SCHEDULED
Status: DISCONTINUED | OUTPATIENT
Start: 2025-07-21 | End: 2025-07-22 | Stop reason: HOSPADM

## 2025-07-21 RX ORDER — FENTANYL CITRATE 50 UG/ML
INJECTION, SOLUTION INTRAMUSCULAR; INTRAVENOUS AS NEEDED
Status: COMPLETED | OUTPATIENT
Start: 2025-07-21 | End: 2025-07-21

## 2025-07-21 RX ORDER — LIDOCAINE HYDROCHLORIDE 10 MG/ML
10 INJECTION, SOLUTION INFILTRATION; PERINEURAL ONCE
Status: COMPLETED | OUTPATIENT
Start: 2025-07-21 | End: 2025-07-21

## 2025-07-21 RX ORDER — MIDAZOLAM HYDROCHLORIDE 1 MG/ML
INJECTION, SOLUTION INTRAMUSCULAR; INTRAVENOUS AS NEEDED
Status: COMPLETED | OUTPATIENT
Start: 2025-07-21 | End: 2025-07-21

## 2025-07-21 RX ORDER — SODIUM CHLORIDE 9 MG/ML
40 INJECTION, SOLUTION INTRAVENOUS AS NEEDED
Status: DISCONTINUED | OUTPATIENT
Start: 2025-07-21 | End: 2025-07-22 | Stop reason: HOSPADM

## 2025-07-21 RX ORDER — SODIUM CHLORIDE 0.9 % (FLUSH) 0.9 %
10 SYRINGE (ML) INJECTION AS NEEDED
Status: DISCONTINUED | OUTPATIENT
Start: 2025-07-21 | End: 2025-07-22 | Stop reason: HOSPADM

## 2025-07-21 RX ORDER — FENTANYL CITRATE 50 UG/ML
INJECTION, SOLUTION INTRAMUSCULAR; INTRAVENOUS
Status: DISPENSED
Start: 2025-07-21 | End: 2025-07-21

## 2025-07-21 RX ADMIN — MIDAZOLAM HYDROCHLORIDE 1 MG: 1 INJECTION, SOLUTION INTRAMUSCULAR; INTRAVENOUS at 10:53

## 2025-07-21 RX ADMIN — FENTANYL CITRATE 50 MCG: 50 INJECTION, SOLUTION INTRAMUSCULAR; INTRAVENOUS at 10:58

## 2025-07-21 RX ADMIN — LIDOCAINE HYDROCHLORIDE 10 ML: 10 INJECTION, SOLUTION INFILTRATION; PERINEURAL at 11:09

## 2025-07-21 RX ADMIN — FENTANYL CITRATE 50 MCG: 50 INJECTION, SOLUTION INTRAMUSCULAR; INTRAVENOUS at 10:53

## 2025-07-21 NOTE — OP NOTE
IR POST OP NOTE    Physician:Rolf Quiroz MD      Procedure: Image guided bone marrow biopsy      Pre Op DX: Hematologic condition      Post Op DX: Same      Anesthesia: Local and moderate sedation      Findings: Successful image guided bone marrow biopsy.      Complications: No immediate      Provider Signature: Rolf Quiroz MD    07/21/25  11:06 EDT

## 2025-07-21 NOTE — PRE-PROCEDURE NOTE
Pre-anesthesia evaluation performed: Yes    PRE-OPERATIVE NOTES    Time: 10:02 EDT    Planned Sedation: Moderate sedation    Risks,Benefits, Complications And Alternatives Discussed with Patients: Yes      Patient re-evaluated for appropriateness of Conscious Sedation: Yes      ASA SCALE ASSESSMENT:  2-Mild to moderate systemic disease, medically well controlled, with no functional limitation    MALLAMPATI CLASSIFICATION:  2-Able to visualize the soft palate, fauces, uvula. The anterior & posterior tonsilar pillars are hidden by the tongue.    Provider signature:  Rolf Quiroz MD  10:02 EDT  07/21/25

## 2025-07-21 NOTE — PRE-PROCEDURE NOTE
[facility]   Interventional Radiology H&P    Patient Name: Alvin Gordon  : 1980  MRN: [id number]  Primary Care Physician:  Sanjay New MD  Referring Physician: Seda Fritz MD  Date of admission: 2025    Subjective   Subjective     HPI:  Alvin Gordon is a 44 y.o. male with a history of a hematologic oncologic condition.  Patient presents to interventional radiology for image guided bone marrow biopsy.    Review of Systems:   Constitutional no fever,  no weight loss       Otolaryngeal no difficulty swallowing   Cardiovascular no chest pain   Pulmonary no cough, no sputum production   Gastrointestinal no constipation, no diarrhea                         Personal History       Past Medical/Surgical History:   Past Medical History:   Diagnosis Date    Anxiety     Back pain     Herpes simplex     Migraine headache     Slow to wake up after anesthesia     Wears contact lenses      Past Surgical History:   Procedure Laterality Date    LUMBAR DISCECTOMY FUSION INSTRUMENTATION N/A 2017    Procedure: LUMBAR LAMINECTOMY, POSTERIOR LUMBAR INTERBODY FUSION L5-S1;  Surgeon: Kevin Jacinto MD;  Location: [facility];  Service:     SHOULDER ARTHROSCOPY Right        Social History:  reports that he has never smoked. He has never used smokeless tobacco. He reports current alcohol use. He reports that he does not use drugs.    Medications:  (Not in a hospital admission)    Current medications:  sodium chloride, 10 mL, Intravenous, Q12H      Current IV drips:       Allergies:  No Known Allergies    Objective    Objective     Vitals:   Temp:  [97.2 °F (36.2 °C)] 97.2 °F (36.2 °C)  Heart Rate:  [75] 75  Resp:  [18] 18  BP: (115)/(89) 115/89      Physical Exam:   Constitutional: Awake, alert, No acute distress    Respiratory: nonlabored respirations         ASA SCALE ASSESSMENT:  2-Mild to moderate systemic disease, medically well controlled, with no functional limitation    MALLAMPATI  "CLASSIFICATION:  2-Able to visualize the soft palate, fauces, uvula. The anterior & posterior tonsilar pillars are hidden by the tongue.       Result Review        Result Review:     No results found for: \"NA\"    No results found for: \"K\"    No results found for: \"CL\"    No results found for: \"PLASMABICARB\"    No results found for: \"BUN\"    No results found for: \"CREATININE\"    No results found for: \"CALCIUM\"        No components found for: \"GLUCOSE.*\"  Results from last 7 days   Lab Units 07/21/25  0915   WBC 10*3/mm3 5.26   HEMOGLOBIN g/dL 12.0*   HEMATOCRIT % 34.5*   PLATELETS 10*3/mm3 187      Results from last 7 days   Lab Units 07/21/25  0915   INR  0.86*           Assessment / Plan     Assesment:  44-year-old male with a previously diagnosed hematologic oncologic condition.  Patient presents to interventional radiology for image guided bone marrow biopsy.      Plan:   Image guided bone marrow biopsy with local and moderate sedation.    The risks and benefits of the procedure were discussed with the patient.    Electronically signed by Rolf Quiroz MD, 07/21/25, 10:01 AM EDT.    "

## 2025-07-21 NOTE — NURSING NOTE
CT guided bone marrow biopsy performed by Dr Quiroz Sample obtained, labeled, and taken to lab per CT tech. 1 MG Versed and 100 MCG Fentanyl given during the procedure for a sedation time of 12 minutes. Dressing applied. Clean dry intact. Post procedure scan completed. Report called to Lashawn MANN.

## 2025-07-22 ENCOUNTER — TELEPHONE (OUTPATIENT)
Dept: INFUSION THERAPY | Facility: HOSPITAL | Age: 45
End: 2025-07-22
Payer: COMMERCIAL

## 2025-07-23 ENCOUNTER — OFFICE VISIT (OUTPATIENT)
Dept: ONCOLOGY | Facility: CLINIC | Age: 45
End: 2025-07-23
Payer: COMMERCIAL

## 2025-07-23 ENCOUNTER — TELEPHONE (OUTPATIENT)
Dept: INFUSION THERAPY | Facility: HOSPITAL | Age: 45
End: 2025-07-23
Payer: COMMERCIAL

## 2025-07-23 VITALS
HEIGHT: 66 IN | DIASTOLIC BLOOD PRESSURE: 80 MMHG | HEART RATE: 90 BPM | TEMPERATURE: 97.1 F | SYSTOLIC BLOOD PRESSURE: 132 MMHG | BODY MASS INDEX: 30.05 KG/M2 | OXYGEN SATURATION: 99 % | WEIGHT: 187 LBS

## 2025-07-23 DIAGNOSIS — D47.2 MONOCLONAL GAMMOPATHY OF RENAL SIGNIFICANCE (MGRS): ICD-10-CM

## 2025-07-23 DIAGNOSIS — N18.30 STAGE 3 CHRONIC KIDNEY DISEASE, UNSPECIFIED WHETHER STAGE 3A OR 3B CKD: ICD-10-CM

## 2025-07-23 DIAGNOSIS — D89.2 PARAPROTEINEMIA: Primary | ICD-10-CM

## 2025-07-23 DIAGNOSIS — N29 MONOCLONAL GAMMOPATHY OF RENAL SIGNIFICANCE (MGRS): ICD-10-CM

## 2025-07-23 LAB
CYTO UR: NORMAL
LAB AP ASPIRATE SMEAR: NORMAL
LAB AP CASE REPORT: NORMAL
LAB AP CBC AND DIFFERENTIAL: NORMAL
LAB AP CLINICAL INFORMATION: NORMAL
LAB AP CLOT SECTION: NORMAL
LAB AP CORE BIOPSY: NORMAL
LAB AP DIAGNOSIS COMMENT: NORMAL
LAB AP FLOW CYTOMETRY SUMMARY: NORMAL
PATH REPORT.FINAL DX SPEC: NORMAL
PATH REPORT.GROSS SPEC: NORMAL

## 2025-07-23 RX ORDER — METHYLPREDNISOLONE SODIUM SUCCINATE 125 MG/2ML
60 INJECTION INTRAMUSCULAR; INTRAVENOUS ONCE
OUTPATIENT
Start: 2025-08-16

## 2025-07-23 RX ORDER — METHYLPREDNISOLONE SODIUM SUCCINATE 125 MG/2ML
100 INJECTION INTRAMUSCULAR; INTRAVENOUS ONCE
OUTPATIENT
Start: 2025-08-09

## 2025-07-23 RX ORDER — ACETAMINOPHEN 500 MG
1000 TABLET ORAL ONCE
OUTPATIENT
Start: 2025-07-30

## 2025-07-23 RX ORDER — BORTEZOMIB 3.5 MG/1
1.3 INJECTION, POWDER, LYOPHILIZED, FOR SOLUTION INTRAVENOUS; SUBCUTANEOUS ONCE
OUTPATIENT
Start: 2025-08-12

## 2025-07-23 RX ORDER — SODIUM CHLORIDE 9 MG/ML
20 INJECTION, SOLUTION INTRAVENOUS ONCE
OUTPATIENT
Start: 2025-08-09

## 2025-07-23 RX ORDER — FAMOTIDINE 10 MG/ML
20 INJECTION, SOLUTION INTRAVENOUS AS NEEDED
OUTPATIENT
Start: 2025-08-09

## 2025-07-23 RX ORDER — DIPHENHYDRAMINE HYDROCHLORIDE 50 MG/ML
50 INJECTION, SOLUTION INTRAMUSCULAR; INTRAVENOUS AS NEEDED
OUTPATIENT
Start: 2025-08-16

## 2025-07-23 RX ORDER — LOSARTAN POTASSIUM 25 MG/1
25 TABLET ORAL DAILY
COMMUNITY

## 2025-07-23 RX ORDER — SODIUM CHLORIDE 9 MG/ML
20 INJECTION, SOLUTION INTRAVENOUS ONCE
OUTPATIENT
Start: 2025-07-30

## 2025-07-23 RX ORDER — HYDROCORTISONE SODIUM SUCCINATE 100 MG/2ML
100 INJECTION INTRAMUSCULAR; INTRAVENOUS AS NEEDED
OUTPATIENT
Start: 2025-08-16

## 2025-07-23 RX ORDER — BORTEZOMIB 3.5 MG/1
1.3 INJECTION, POWDER, LYOPHILIZED, FOR SOLUTION INTRAVENOUS; SUBCUTANEOUS ONCE
OUTPATIENT
Start: 2025-07-30

## 2025-07-23 RX ORDER — HYDROCORTISONE SODIUM SUCCINATE 100 MG/2ML
100 INJECTION INTRAMUSCULAR; INTRAVENOUS AS NEEDED
OUTPATIENT
Start: 2025-07-30

## 2025-07-23 RX ORDER — HYDROCORTISONE SODIUM SUCCINATE 100 MG/2ML
100 INJECTION INTRAMUSCULAR; INTRAVENOUS AS NEEDED
OUTPATIENT
Start: 2025-08-09

## 2025-07-23 RX ORDER — FAMOTIDINE 10 MG/ML
20 INJECTION, SOLUTION INTRAVENOUS AS NEEDED
OUTPATIENT
Start: 2025-07-30

## 2025-07-23 RX ORDER — MONTELUKAST SODIUM 10 MG/1
10 TABLET ORAL ONCE
OUTPATIENT
Start: 2025-07-30

## 2025-07-23 RX ORDER — DIPHENHYDRAMINE HYDROCHLORIDE 50 MG/ML
50 INJECTION, SOLUTION INTRAMUSCULAR; INTRAVENOUS AS NEEDED
OUTPATIENT
Start: 2025-08-09

## 2025-07-23 RX ORDER — BORTEZOMIB 3.5 MG/1
1.3 INJECTION, POWDER, LYOPHILIZED, FOR SOLUTION INTRAVENOUS; SUBCUTANEOUS ONCE
OUTPATIENT
Start: 2025-08-05

## 2025-07-23 RX ORDER — METHYLPREDNISOLONE SODIUM SUCCINATE 125 MG/2ML
100 INJECTION INTRAMUSCULAR; INTRAVENOUS ONCE
OUTPATIENT
Start: 2025-07-30

## 2025-07-23 RX ORDER — ACETAMINOPHEN 500 MG
1000 TABLET ORAL ONCE
OUTPATIENT
Start: 2025-08-16

## 2025-07-23 RX ORDER — SODIUM CHLORIDE 9 MG/ML
20 INJECTION, SOLUTION INTRAVENOUS ONCE
OUTPATIENT
Start: 2025-08-16

## 2025-07-23 RX ORDER — FAMOTIDINE 10 MG/ML
20 INJECTION, SOLUTION INTRAVENOUS AS NEEDED
OUTPATIENT
Start: 2025-08-16

## 2025-07-23 RX ORDER — ACETAMINOPHEN 500 MG
1000 TABLET ORAL ONCE
OUTPATIENT
Start: 2025-08-09

## 2025-07-23 RX ORDER — BORTEZOMIB 3.5 MG/1
1.3 INJECTION, POWDER, LYOPHILIZED, FOR SOLUTION INTRAVENOUS; SUBCUTANEOUS ONCE
OUTPATIENT
Start: 2025-08-09

## 2025-07-23 RX ORDER — DIPHENHYDRAMINE HYDROCHLORIDE 50 MG/ML
50 INJECTION, SOLUTION INTRAMUSCULAR; INTRAVENOUS AS NEEDED
OUTPATIENT
Start: 2025-07-30

## 2025-07-23 NOTE — LETTER
2025     Santino Castro MD  [address]  [address]  [facility] [address]    Patient: Alvin Gordon   YOB: 1980   Date of Visit: 2025     Dear Santino Castro MD:       Thank you for referring Alvin Gordon to me for evaluation. Below are the relevant portions of my assessment and plan of care.    If you have questions, please do not hesitate to call me. I look forward to following Alvin along with you.         Sincerely,        Seda Fritz MD        CC: MD Lam Cisneros Amie E, MD  25 1721  Sign when Signing Visit    Hematology and On[facility] [place]  Office number [phone]    Fax number [phone]     Follow up     Date: 25      Patient Name: Alvin Gordon  MRN: [id number]  : 1980    Referring Physician: Dr. Santino Castro MD    Chief Complaint: [place] free light chain multiple myeloma    History of Present Illness: Alvin Gordon is a pleasant 44 y.o. male who presents today for evaluation of proteinuria with renal biopsy showing possible free light chain deposition.     Was seen at clinic at his work and on baseline labs, was found to have creatinine elevation. Per his report, nephrologist obtained prior labs showing this was of gradual onset over several years. Workup revealed proteinuria and he ultimately went for renal biopsy showing:       He was referred for further evaluation of possible MGRS/myeloma.     He underwent bone marrow biopsy showing 80% involvement by plasma cell myeloma.    -Serum monoclonal protein: Small to quantitate  -Serum free light chains: kappa 69935 mg/dL; lambda 2.7 mg/dL; ratio 6297  -UPEP: 9725 mg/24 hours kappa free light chains  -Beta-2 microglobulin: elevated    -Immunoglobulins: IgG 608; IgA 37; IgM 6   -Hemoglobin: 12  -Creatinine: 1.8 Estimated Creatinine Clearance: 52.6 mL/min (A) (by C-G formula based on SCr of 1.83 mg/dL (H)).  -Calcium 9.2  -Bone survey: negative  -PET  "pending      Interval history:   Stable symptoms. Questions regarding results and treatment options    Past Medical History:   Past Medical History:   Diagnosis Date   • Anxiety    • Back pain    • Herpes simplex    • Migraine headache    • Slow to wake up after anesthesia    • Wears contact lenses        Past Surgical History:   Past Surgical History:   Procedure Laterality Date   • LUMBAR DISCECTOMY FUSION INSTRUMENTATION N/A 12/12/2017    Procedure: LUMBAR LAMINECTOMY, POSTERIOR LUMBAR INTERBODY FUSION L5-S1;  Surgeon: Kevin Jacinto MD;  Location: [facility];  Service:    • SHOULDER ARTHROSCOPY Right        Family History:   Family History   Problem Relation Age of Onset   • No Known Problems Mother    • No Known Problems Father    Grandmother colon cancer    Social History:   Social History     Socioeconomic History   • Marital status:    Tobacco Use   • Smoking status: Never   • Smokeless tobacco: Never   Substance and Sexual Activity   • Alcohol use: Yes     Comment: occasionally   • Drug use: No   • Sexual activity: Defer   Works at [employer]    Medications:   No current outpatient medications on file.    Allergies:   No Known Allergies    Objective     Vital Signs:   Vitals:    07/23/25 1350   BP: 132/80   Pulse: 90   Temp: 97.1 °F (36.2 °C)   TempSrc: Infrared   SpO2: 99%   Weight: 84.8 kg (187 lb)   Height: 167.6 cm (65.98\")   PainSc: 0-No pain    Body mass index is 30.2 kg/m².   Pain Score    07/23/25 1350   PainSc: 0-No pain       ECOG Performance Status: 0 - Asymptomatic    Physical Exam:   General: No acute distress. Well appearing   HEENT: Normocephalic, atraumatic. Sclera anicteric.   Neck: supple, no adenopathy.   Cardiovascular: regular rate and rhythm. No murmurs.   Respiratory: Normal rate. Clear to auscultation bilaterally  Abdomen: Soft, nontender, non distended with normoactive bowel sounds  Lymph: no cervical, supraclavicular or axillary adenopathy  Neuro: Alert and oriented x " 3. No focal deficits.   Ext: Symmetric, no swelling.   Accurate 7/23/25    Laboratory/Imaging Reviewed:   Hospital Outpatient Visit on 07/21/2025   Component Date Value Ref Range Status   • Protime 07/21/2025 12.3  12.2 - 15.3 Seconds Final   • INR 07/21/2025 0.86 (L)  0.89 - 1.12 Final   • WBC 07/21/2025 5.26  3.40 - 10.80 10*3/mm3 Final   • RBC 07/21/2025 3.40 (L)  4.14 - 5.80 10*6/mm3 Final   • Hemoglobin 07/21/2025 12.0 (L)  13.0 - 17.7 g/dL Final   • Hematocrit 07/21/2025 34.5 (L)  37.5 - 51.0 % Final   • MCV 07/21/2025 101.5 (H)  79.0 - 97.0 fL Final   • MCH 07/21/2025 35.3 (H)  26.6 - 33.0 pg Final   • MCHC 07/21/2025 34.8  31.5 - 35.7 g/dL Final   • RDW 07/21/2025 14.5  12.3 - 15.4 % Final   • RDW-SD 07/21/2025 52.2  37.0 - 54.0 fl Final   • MPV 07/21/2025 9.4  6.0 - 12.0 fL Final   • Platelets 07/21/2025 187  140 - 450 10*3/mm3 Final   • Neutrophil % 07/21/2025 48.2  42.7 - 76.0 % Final   • Lymphocyte % 07/21/2025 37.5  19.6 - 45.3 % Final   • Monocyte % 07/21/2025 8.4  5.0 - 12.0 % Final   • Eosinophil % 07/21/2025 2.3  0.3 - 6.2 % Final   • Basophil % 07/21/2025 0.4  0.0 - 1.5 % Final   • Immature Grans % 07/21/2025 3.2 (H)  0.0 - 0.5 % Final   • Neutrophils, Absolute 07/21/2025 2.54  1.70 - 7.00 10*3/mm3 Final   • Lymphocytes, Absolute 07/21/2025 1.97  0.70 - 3.10 10*3/mm3 Final   • Monocytes, Absolute 07/21/2025 0.44  0.10 - 0.90 10*3/mm3 Final   • Eosinophils, Absolute 07/21/2025 0.12  0.00 - 0.40 10*3/mm3 Final   • Basophils, Absolute 07/21/2025 0.02  0.00 - 0.20 10*3/mm3 Final   • Immature Grans, Absolute 07/21/2025 0.17 (H)  0.00 - 0.05 10*3/mm3 Final   • nRBC 07/21/2025 0.0  0.0 - 0.2 /100 WBC Final   Lab on 07/11/2025   Component Date Value Ref Range Status   • Total Protein, Urine 07/11/2025 422.1  Not Estab. mg/dL Final    Results confirmed on  dilution.   • Protein, 24H Urine 07/11/2025 15074 (H)  30 - 150 mg/24 hr Final   • Albumin, U 07/11/2025 5.8  % Final   • Alpha-1-Globulin, U 07/11/2025  1.0  % Final   • Alpha-2-Globulin, U 07/11/2025 9.1  % Final   • Beta Globulin, U 07/11/2025 80.1  % Final   • Gamma Globulin, Urine 07/11/2025 4.0  % Final   • M-Jhony, % U 07/11/2025 72.0 (H)  Not Observed % Final   • M-Jhony, mg/24 hr 07/11/2025 9725 (H)  Not Observed mg/24 hr Final   • Immunofixation Result, Urine 07/11/2025 Comment (A)   Final    Bence Rudd Protein positive; kappa type.   • Note: 07/11/2025 Comment   Final    Protein electrophoresis scan will follow via computer, mail, or   delivery.   • Free Kappa Lt Chains,Ur 07/11/2025 Note: (A)  1.17 - 86.46 mg/L Final    09414.17   • Free Kappa Lt Chains, 24hr 07/11/2025 TNP  mg/24 hr Final    Unable to calculate result since non-numeric result obtained for  component test.   • Free Lambda Lt Chains,Ur 07/11/2025 13.45  0.27 - 15.21 mg/L Final   • Free Lambda Lt Chains, 24 Hr 07/11/2025 43.04  Undefined mg/24 hr Final   • Kappa/Lambda Ratio,U 07/11/2025 4039.19 (H)  1.83 - 14.26 Final       CT Guided Biopsy Bone Marrow  Result Date: 7/21/2025  Narrative: PROCEDURE: CT-guided bone marrow biopsy  Procedural Personnel Attending physician(s): Rolf Quiroz   Pre-procedure diagnosis: Light chain deposition disease Post-procedure diagnosis: Same   Complications: No immediate complications.      Impression:  Successful CT-guided bone marrow biopsy.  Specimen(s) sent for evaluation. _______________________________________________________________  PROCEDURE SUMMARY: - Percutaneous CT-guided bone marrow biopsy.  PROCEDURE DETAILS:  Pre-procedure Reference imaging for biopsy target: None Consent: Informed consent for the procedure including risks, benefits and alternatives was obtained and time-out was performed prior to the procedure. Preparation: The site was prepared and draped using maximal sterile barrier technique including cutaneous antisepsis.  Anesthesia/sedation Level of anesthesia/sedation: Moderate sedation (conscious sedation)  Anesthesia/sedation administered by: Independent trained observer under attending supervision with continuous monitoring of the patient?s level of consciousness and physiologic status Total intra-service sedation time (minutes): 12   Biopsy Local anesthesia was administered. Under CT guidance a 9 gauge bone trocar was advanced into the iliac crest. 15 cc of bone marrow was aspirated into a heparinized syringe.  A core bone biopsy was placed in formalin.  Needle removal The biopsy needle was removed and a sterile dressing was applied.  Imaging following biopsy Immediate post-biopsy imaging was performed using CT guidance. Post-biopsy imaging findings: Unremarkable.   Radiation Dose CT dose length product (mGy-cm): 357  Additional Details Specimens removed: Biopsy samples as detailed above Estimated blood loss (mL): Less than 10    7/21/2025 4:54 PM by Rolf Quiroz MD on Workstation: [id number]      XR Bone Survey Complete  Result Date: 7/16/2025  Narrative: XR BONE SURVEY COMPLETE Date of Exam: 7/9/2025 2:34 PM EDT Indication: free light chain elevation. Comparison: Lumbar spine radiographs 1/18/2018. CT abdomen pelvis 8/6/2015. Technique:  A complete adult bone survey of the head, neck, chest, abdomen, pelvis and extremities were performed per protocol. Findings: No acute fracture or dislocation. No bony erosive changes. Normal bone mineralization. Mild degenerative changes of the cervical spine. Postsurgical changes of L5-S1 posterior spinal fusion with grade 2 anterolisthesis of L5 on S1. The cardiomediastinal silhouette is within normal limits. No consolidation, pleural effusion or pneumothorax. Nonobstructive bowel gas pattern.     Impression: Impression: No acute osseous findings. Report dictated by: Shirin Bolton MD  I have personally reviewed this case and agree with the findings above: Electronically Signed: John Reagan MD  7/16/2025 10:27 AM EDT  Workstation ID: [id number]      Procedures    Assessment /  Plan      Assessment/Plan:     Plasma cell myeloma, kappa restricted presenting with chronic kidney disease   2. Abnormal renal biopsy with suggestion of free light chain deposition  -I discussed his bone marrow biopsy results with his pathologist.  Discussed with the patient the diagnosis, prognosis, and potential treatment of myeloma.  Plan induction therapy with daratumumab, Velcade, Revlimid, and Dex.  -Myeloma FISH panel is pending for prognostication and to determine need for referral to stem cell transplant center.  -Reviewed schedule and side effects in detail.  -Will initiate treatment next week  -Bone survey negative. Will obtain baseline PET scan-We discussed obtaining blood and urine testing and bone survey:    Follow Up:   1 week NP and treat     Seda Fritz MD  Hematology and Oncology     Time spent on the day of service was 42 min inclusive of time before, during, and after office visit on record review, medically appropriate history and physical, counseling patient, orders, documenting in the medical record, coordinating care

## 2025-07-23 NOTE — PROGRESS NOTES
Hematology and Oncology [place]  Office number [phone]    Fax number [phone]     Follow up     Date: 25      Patient Name: Alvin Gordon  MRN: [id number]  : 1980    Referring Physician: Dr. Santino Castro MD    Chief Complaint: [place] free light chain multiple myeloma    History of Present Illness: Alvin Gordon is a pleasant 44 y.o. male who presents today for evaluation of proteinuria with renal biopsy showing possible free light chain deposition.     Was seen at clinic at his work and on baseline labs, was found to have creatinine elevation. Per his report, nephrologist obtained prior labs showing this was of gradual onset over several years. Workup revealed proteinuria and he ultimately went for renal biopsy showing:       He was referred for further evaluation of possible MGRS/myeloma.     He underwent bone marrow biopsy showing 80% involvement by plasma cell myeloma.    -Serum monoclonal protein: Small to quantitate  -Serum free light chains: kappa 72287 mg/dL; lambda 2.7 mg/dL; ratio 6297  -UPEP: 9725 mg/24 hours kappa free light chains  -Beta-2 microglobulin: elevated    -Immunoglobulins: IgG 608; IgA 37; IgM 6   -Hemoglobin: 12  -Creatinine: 1.8 Estimated Creatinine Clearance: 52.6 mL/min (A) (by C-G formula based on SCr of 1.83 mg/dL (H)).  -Calcium 9.2  -Bone survey: negative  -PET pending      Interval history:   Stable symptoms. Questions regarding results and treatment options    Past Medical History:   Past Medical History:   Diagnosis Date    Anxiety     Back pain     Herpes simplex     Migraine headache     Slow to wake up after anesthesia     Wears contact lenses        Past Surgical History:   Past Surgical History:   Procedure Laterality Date    LUMBAR DISCECTOMY FUSION INSTRUMENTATION N/A 2017    Procedure: LUMBAR LAMINECTOMY, POSTERIOR LUMBAR INTERBODY FUSION L5-S1;  Surgeon: Kevin Jacinto MD;  Location: [facility];  Service:     SHOULDER  "ARTHROSCOPY Right        Family History:   Family History   Problem Relation Age of Onset    No Known Problems Mother     No Known Problems Father    Grandmother colon cancer    Social History:   Social History     Socioeconomic History    Marital status:    Tobacco Use    Smoking status: Never    Smokeless tobacco: Never   Substance and Sexual Activity    Alcohol use: Yes     Comment: occasionally    Drug use: No    Sexual activity: Defer   Works at [employer]    Medications:   No current outpatient medications on file.    Allergies:   No Known Allergies    Objective     Vital Signs:   Vitals:    07/23/25 1350   BP: 132/80   Pulse: 90   Temp: 97.1 °F (36.2 °C)   TempSrc: Infrared   SpO2: 99%   Weight: 84.8 kg (187 lb)   Height: 167.6 cm (65.98\")   PainSc: 0-No pain    Body mass index is 30.2 kg/m².   Pain Score    07/23/25 1350   PainSc: 0-No pain       ECOG Performance Status: 0 - Asymptomatic    Physical Exam:   General: No acute distress. Well appearing   HEENT: Normocephalic, atraumatic. Sclera anicteric.   Neck: supple, no adenopathy.   Cardiovascular: regular rate and rhythm. No murmurs.   Respiratory: Normal rate. Clear to auscultation bilaterally  Abdomen: Soft, nontender, non distended with normoactive bowel sounds  Lymph: no cervical, supraclavicular or axillary adenopathy  Neuro: Alert and oriented x 3. No focal deficits.   Ext: Symmetric, no swelling.   Accurate 7/23/25    Laboratory/Imaging Reviewed:   Hospital Outpatient Visit on 07/21/2025   Component Date Value Ref Range Status    Protime 07/21/2025 12.3  12.2 - 15.3 Seconds Final    INR 07/21/2025 0.86 (L)  0.89 - 1.12 Final    WBC 07/21/2025 5.26  3.40 - 10.80 10*3/mm3 Final    RBC 07/21/2025 3.40 (L)  4.14 - 5.80 10*6/mm3 Final    Hemoglobin 07/21/2025 12.0 (L)  13.0 - 17.7 g/dL Final    Hematocrit 07/21/2025 34.5 (L)  37.5 - 51.0 % Final    MCV 07/21/2025 101.5 (H)  79.0 - 97.0 fL Final    MCH 07/21/2025 35.3 (H)  26.6 - 33.0 pg Final    MCHC " 07/21/2025 34.8  31.5 - 35.7 g/dL Final    RDW 07/21/2025 14.5  12.3 - 15.4 % Final    RDW-SD 07/21/2025 52.2  37.0 - 54.0 fl Final    MPV 07/21/2025 9.4  6.0 - 12.0 fL Final    Platelets 07/21/2025 187  140 - 450 10*3/mm3 Final    Neutrophil % 07/21/2025 48.2  42.7 - 76.0 % Final    Lymphocyte % 07/21/2025 37.5  19.6 - 45.3 % Final    Monocyte % 07/21/2025 8.4  5.0 - 12.0 % Final    Eosinophil % 07/21/2025 2.3  0.3 - 6.2 % Final    Basophil % 07/21/2025 0.4  0.0 - 1.5 % Final    Immature Grans % 07/21/2025 3.2 (H)  0.0 - 0.5 % Final    Neutrophils, Absolute 07/21/2025 2.54  1.70 - 7.00 10*3/mm3 Final    Lymphocytes, Absolute 07/21/2025 1.97  0.70 - 3.10 10*3/mm3 Final    Monocytes, Absolute 07/21/2025 0.44  0.10 - 0.90 10*3/mm3 Final    Eosinophils, Absolute 07/21/2025 0.12  0.00 - 0.40 10*3/mm3 Final    Basophils, Absolute 07/21/2025 0.02  0.00 - 0.20 10*3/mm3 Final    Immature Grans, Absolute 07/21/2025 0.17 (H)  0.00 - 0.05 10*3/mm3 Final    nRBC 07/21/2025 0.0  0.0 - 0.2 /100 WBC Final   Lab on 07/11/2025   Component Date Value Ref Range Status    Total Protein, Urine 07/11/2025 422.1  Not Estab. mg/dL Final    Results confirmed on  dilution.    Protein, 24H Urine 07/11/2025 85519 (H)  30 - 150 mg/24 hr Final    Albumin, U 07/11/2025 5.8  % Final    Alpha-1-Globulin, U 07/11/2025 1.0  % Final    Alpha-2-Globulin, U 07/11/2025 9.1  % Final    Beta Globulin, U 07/11/2025 80.1  % Final    Gamma Globulin, Urine 07/11/2025 4.0  % Final    M-Jhony, % U 07/11/2025 72.0 (H)  Not Observed % Final    M-Jhony, mg/24 hr 07/11/2025 9725 (H)  Not Observed mg/24 hr Final    Immunofixation Result, Urine 07/11/2025 Comment (A)   Final    Bence Rudd Protein positive; kappa type.    Note: 07/11/2025 Comment   Final    Protein electrophoresis scan will follow via computer, mail, or   delivery.    Free Kappa Lt Chains,Ur 07/11/2025 Note: (A)  1.17 - 86.46 mg/L Final    03510.17    Free Kappa Lt Chains, 24hr 07/11/2025 TNP   mg/24 hr Final    Unable to calculate result since non-numeric result obtained for  component test.    Free Lambda Lt Chains,Ur 07/11/2025 13.45  0.27 - 15.21 mg/L Final    Free Lambda Lt Chains, 24 Hr 07/11/2025 43.04  Undefined mg/24 hr Final    Kappa/Lambda Ratio,U 07/11/2025 4039.19 (H)  1.83 - 14.26 Final       CT Guided Biopsy Bone Marrow  Result Date: 7/21/2025  Narrative: PROCEDURE: CT-guided bone marrow biopsy  Procedural Personnel Attending physician(s): Rolf Quiroz   Pre-procedure diagnosis: Light chain deposition disease Post-procedure diagnosis: Same   Complications: No immediate complications.      Impression:  Successful CT-guided bone marrow biopsy.  Specimen(s) sent for evaluation. _______________________________________________________________  PROCEDURE SUMMARY: - Percutaneous CT-guided bone marrow biopsy.  PROCEDURE DETAILS:  Pre-procedure Reference imaging for biopsy target: None Consent: Informed consent for the procedure including risks, benefits and alternatives was obtained and time-out was performed prior to the procedure. Preparation: The site was prepared and draped using maximal sterile barrier technique including cutaneous antisepsis.  Anesthesia/sedation Level of anesthesia/sedation: Moderate sedation (conscious sedation) Anesthesia/sedation administered by: Independent trained observer under attending supervision with continuous monitoring of the patient?s level of consciousness and physiologic status Total intra-service sedation time (minutes): 12   Biopsy Local anesthesia was administered. Under CT guidance a 9 gauge bone trocar was advanced into the iliac crest. 15 cc of bone marrow was aspirated into a heparinized syringe.  A core bone biopsy was placed in formalin.  Needle removal The biopsy needle was removed and a sterile dressing was applied.  Imaging following biopsy Immediate post-biopsy imaging was performed using CT guidance. Post-biopsy imaging findings:  Unremarkable.   Radiation Dose CT dose length product (mGy-cm): 357  Additional Details Specimens removed: Biopsy samples as detailed above Estimated blood loss (mL): Less than 10    7/21/2025 4:54 PM by Rolf Quiroz MD on Workstation: [id number]      XR Bone Survey Complete  Result Date: 7/16/2025  Narrative: XR BONE SURVEY COMPLETE Date of Exam: 7/9/2025 2:34 PM EDT Indication: free light chain elevation. Comparison: Lumbar spine radiographs 1/18/2018. CT abdomen pelvis 8/6/2015. Technique:  A complete adult bone survey of the head, neck, chest, abdomen, pelvis and extremities were performed per protocol. Findings: No acute fracture or dislocation. No bony erosive changes. Normal bone mineralization. Mild degenerative changes of the cervical spine. Postsurgical changes of L5-S1 posterior spinal fusion with grade 2 anterolisthesis of L5 on S1. The cardiomediastinal silhouette is within normal limits. No consolidation, pleural effusion or pneumothorax. Nonobstructive bowel gas pattern.     Impression: Impression: No acute osseous findings. Report dictated by: Shirin Bolton MD  I have personally reviewed this case and agree with the findings above: Electronically Signed: John Reagan MD  7/16/2025 10:27 AM EDT  Workstation ID: [id number]      Procedures    Assessment / Plan      Assessment/Plan:     Plasma cell myeloma, kappa restricted presenting with chronic kidney disease   2. Abnormal renal biopsy with suggestion of free light chain deposition  -I discussed his bone marrow biopsy results with his pathologist.  Discussed with the patient the diagnosis, prognosis, and potential treatment of myeloma.  Plan induction therapy with daratumumab, Velcade, Revlimid, and Dex.  -Myeloma FISH panel is pending for prognostication and to determine need for referral to stem cell transplant center.  -Reviewed schedule and side effects in detail.  -Will initiate treatment next week  -Bone survey negative. Will obtain baseline  PET scan-We discussed obtaining blood and urine testing and bone survey:    Follow Up:   1 week NP and treat     Seda Fritz MD  Hematology and Oncology     Time spent on the day of service was 42 min inclusive of time before, during, and after office visit on record review, medically appropriate history and physical, counseling patient, orders, documenting in the medical record, coordinating care

## 2025-07-24 ENCOUNTER — SPECIALTY PHARMACY (OUTPATIENT)
Dept: ONCOLOGY | Facility: HOSPITAL | Age: 45
End: 2025-07-24
Payer: COMMERCIAL

## 2025-07-24 DIAGNOSIS — N29 MONOCLONAL GAMMOPATHY OF RENAL SIGNIFICANCE (MGRS): Primary | ICD-10-CM

## 2025-07-24 DIAGNOSIS — D47.2 MONOCLONAL GAMMOPATHY OF RENAL SIGNIFICANCE (MGRS): Primary | ICD-10-CM

## 2025-07-24 RX ORDER — LENALIDOMIDE 10 MG/1
10 CAPSULE ORAL DAILY
Qty: 14 CAPSULE | Refills: 0 | Status: SHIPPED | OUTPATIENT
Start: 2025-07-24 | End: 2025-07-24 | Stop reason: SDUPTHER

## 2025-07-24 RX ORDER — LENALIDOMIDE 10 MG/1
10 CAPSULE ORAL DAILY
Qty: 14 CAPSULE | Refills: 0 | Status: SHIPPED | OUTPATIENT
Start: 2025-07-24

## 2025-07-24 NOTE — PROGRESS NOTES
The patient is unable to fill lenalidomide at [facility]  due to having [employer] insurance through [employer]. The patient's insurance requires the patient to fill lenalidomide at [facility]  instead. I [facility] a [facility] on 7/24/25.    Milla Jay, PharmD  Clinic Oncology Pharmacy Specialist  [phone]

## 2025-07-24 NOTE — PROGRESS NOTES
Oral Chemotherapy - New Referral    Received a referral from Dr. Fritz    Treatment Plan: Revlimid (lenalidomide), Darzalex Faspro, bortezomib, and dexamethasone   -Revlimid (lenalidomide) 10 mg capsule - Take 1 capsule by mouth daily on days 1-14, then off for 7 days in a 21-day cycle  -Darzalex Faspro (daratumumab) 1,800 mg/30,000 units subQ on days 1, 8, and 15 in a 21-day cycle  -Velcade (bortezomib) 1.3 mg/m2 subQ on days 1, 4, 8, and 11 in a 21-day cycle  -Decadron (dexamethasone) 4 mg tablet - Take 5 tablets by mouth on days 1, 2, 8, 9, 15, and 16    Start Date of Treatment: Cycle 1 Day 1 is scheduled on 7/30/25  Indication: [place] free light chain multiple myeloma, plasma cell myeloma   Relevant Past Treatments: This is a new diagnosis. The patient's bone marrow biopsy revealed 80% involvement by plasma cell myeloma. The patient's myeloma FISH panel is pending. Dr. Fritz recommends starting induction therapy with lenalidomide, daratumumab, bortezomib, and dexamethasone for 4 cycles.     Therapy Appropriate Based on Treatment Guidelines and FDA Labeling?: Yes  Therapeutic Goals: Planning to continue for 4 cycles total  Patient Can Self-Administer Oral Medications?: Yes  Supportive care medications: The prescriptions below need to be sent.    -dexamethasone 20 mg by mouth on days 1, 2, 8, 9, 15, and 16  -acyclovir 400 mg by mouth twice daily  -Bactrim DS - Take 1 tablet by mouth on Mondays, Wednesdays, and Fridays  -ondansetron 8 mg by mouth every 8 hours as needed for nausea or vomiting  -I will  the patient to start taking OTC aspirin 81 mg by mouth daily to reduce the risk of blood clots with lenalidomide    Drug-Drug Interactions: The current medication list was reviewed and there are some relevant drug-drug interactions with the oral specialty medication that will be discussed during education, including:   -Category D: dexamethasone may increase the thrombogenic effects of lenalidomide (will   the patient to start taking OTC aspirin 81 mg PO daily to reduce the risk of blood clots with lenalidomide during education)    -Category C: Increased risk of hypotension with losartan and bortezomib (monitor for dizziness, low BP)   -Category C: trimethoprim in Bactrim DS may increase the risk of hyperkalemia with losartan (will monitor potassium on CMP during treatment)   Medication Allergies: The patient has NKDA  Review of Labs/Dose Adjustments: The patient's most recent labs were reviewed and the medication has been dose adjusted based on renal function. The patient's calculated CrCl is ~52.6 mL/min (adjBW).   -The package insert recommends using lenalidomide 10 mg instead of lenalidomide 25 if CrCl is 30-60 mL/min with the option of increasing to lenalidomide 15 mg after 2 cycles if tolerating treatment well.     -I will send a prescription for lenalidomide to [facility]  since this is a limited distribution drug (LDD) that cannot be filled at [facility].     A prescription was released to [facility] for   Drug: Revlimid (lenalidomide)  Strength: 10 mg  Directions: Take 1 capsule by mouth daily ON days 1-14, OFF for 7 days of each 21- day cycle  Quantity: 14 capsules  Refills: 0  REMS: [id number]     Pharmacy education is scheduled for 7/25/25. CCA and consent will be signed at that time.    Milla Jay, PharmD  Clinic Oncology Pharmacy Specialist  [phone]    7/24/2025  08:09 EDT   not applicable

## 2025-07-24 NOTE — PROGRESS NOTES
Oral Chemotherapy - Double Check    Drug: lenalidomide (REVLIMID) 10 MG capsule   Dose: 10 mg Route: Oral Frequency: Daily   Dispense Quantity: 14 capsule Refills: 0          Sig: Take 1 capsule by mouth Daily. On days 1-14, then off for 7 days in a 21-day cycle. Adult male patient, REMS [id number].     Released to pharmacy: [facility]    Recommend trending renal function closely given his disease and recent trend in SCr.   Creatinine   Date Value Ref Range Status   07/09/2025 1.83 (H) 0.76 - 1.27 mg/dL Final   03/15/2021 1.55 (H) 0.76 - 1.27 mg/dL Final   05/17/2019 1.50 (H) 0.76 - 1.27 mg/dL Final   05/04/2018 1.50 (H) 0.60 - 1.30 mg/dL Final   12/10/2017 1.40 (H) 0.60 - 1.30 mg/dL Final     Per CG using total body weight, his current creatinine clearance is est. 61.2mL/min.     I completed an independent review of the medication order and prescription, and I agree with the assessment and note.     Ann Cowden Mayer, PharmD, BCPS  Clinical Oncology Pharmacy Specialist  Phone [phone]    7/24/2025  08:28 EDT

## 2025-07-25 ENCOUNTER — OFFICE VISIT (OUTPATIENT)
Dept: ONCOLOGY | Facility: CLINIC | Age: 45
End: 2025-07-25
Payer: COMMERCIAL

## 2025-07-25 ENCOUNTER — HOSPITAL ENCOUNTER (OUTPATIENT)
Dept: ONCOLOGY | Facility: HOSPITAL | Age: 45
Discharge: HOME | End: 2025-07-25
Payer: COMMERCIAL

## 2025-07-25 ENCOUNTER — SPECIALTY PHARMACY (OUTPATIENT)
Dept: ONCOLOGY | Facility: HOSPITAL | Age: 45
End: 2025-07-25
Payer: COMMERCIAL

## 2025-07-25 VITALS
OXYGEN SATURATION: 100 % | SYSTOLIC BLOOD PRESSURE: 123 MMHG | TEMPERATURE: 97.1 F | HEART RATE: 79 BPM | BODY MASS INDEX: 30.7 KG/M2 | HEIGHT: 66 IN | WEIGHT: 191 LBS | DIASTOLIC BLOOD PRESSURE: 83 MMHG

## 2025-07-25 DIAGNOSIS — N29 MONOCLONAL GAMMOPATHY OF RENAL SIGNIFICANCE (MGRS): ICD-10-CM

## 2025-07-25 DIAGNOSIS — D47.2 MONOCLONAL GAMMOPATHY OF RENAL SIGNIFICANCE (MGRS): ICD-10-CM

## 2025-07-25 DIAGNOSIS — C90.00 MULTIPLE MYELOMA NOT HAVING ACHIEVED REMISSION: Primary | ICD-10-CM

## 2025-07-25 PROCEDURE — 99214 OFFICE O/P EST MOD 30 MIN: CPT | Performed by: NURSE PRACTITIONER

## 2025-07-25 RX ORDER — ACYCLOVIR 400 MG/1
400 TABLET ORAL 2 TIMES DAILY
Qty: 60 TABLET | Refills: 3 | Status: SHIPPED | OUTPATIENT
Start: 2025-07-25

## 2025-07-25 RX ORDER — SULFAMETHOXAZOLE AND TRIMETHOPRIM 800; 160 MG/1; MG/1
1 TABLET ORAL 3 TIMES WEEKLY
Qty: 12 TABLET | Refills: 3 | Status: SHIPPED | OUTPATIENT
Start: 2025-07-25

## 2025-07-25 RX ORDER — OMEPRAZOLE 20 MG/1
20 CAPSULE, DELAYED RELEASE ORAL DAILY
Qty: 30 CAPSULE | Refills: 2 | Status: SHIPPED | OUTPATIENT
Start: 2025-07-25

## 2025-07-25 RX ORDER — ONDANSETRON 8 MG/1
8 TABLET, FILM COATED ORAL 3 TIMES DAILY PRN
Qty: 30 TABLET | Refills: 5 | Status: SHIPPED | OUTPATIENT
Start: 2025-07-25

## 2025-07-25 RX ORDER — DEXAMETHASONE 4 MG/1
20 TABLET ORAL
Qty: 30 TABLET | Refills: 3 | Status: SHIPPED | OUTPATIENT
Start: 2025-07-25

## 2025-07-25 NOTE — PROGRESS NOTES
CHEMOTHERAPY PREPARATION    Alvin Gordon  [id number]  1980    Chief Complaint: Treatment preparation and needs assessment    History of present illness:  Alvin Gordon is a 44 y.o. year old male who is here today with his wife for treatment preparation and needs assessment.  The patient has been diagnosed with multiple myeloma and is scheduled to begin treatment with Daratumumab / Lenalidomide / Bortezomib / Dexamethasone .     The current medication list and allergy list were reviewed and reconciled.     Past Medical History, Past Surgical History, Social History, Family History have been reviewed and are without significant changes except as mentioned.    Review of Systems:    Review of Systems   Constitutional:  Negative for appetite change, fatigue, fever and unexpected weight change.   HENT:  Negative for mouth sores, sore throat and trouble swallowing.    Respiratory:  Negative for cough, shortness of breath and wheezing.    Cardiovascular:  Negative for chest pain, palpitations and leg swelling.   Gastrointestinal:  Negative for abdominal distention, abdominal pain, constipation, diarrhea, nausea and vomiting.   Genitourinary:  Negative for difficulty urinating, dysuria and frequency.   Musculoskeletal:  Negative for arthralgias.   Skin:  Negative for pallor, rash and wound.   Neurological:  Negative for dizziness and weakness.   Hematological:  Does not bruise/bleed easily.   Psychiatric/Behavioral:  Negative for confusion and sleep disturbance. The patient is nervous/anxious.      Physical Exam:    Vitals:    07/25/25 0757   BP: 123/83   Pulse: 79   Temp: 97.1 °F (36.2 °C)   SpO2: 100%     Vitals:    07/25/25 0757   PainSc: 0-No pain          ECOG: (0) Fully Active - Able to Carry On All Pre-disease Performance Without Restriction    General: well appearing, in no acute distress    Psych: Mood is stable          NEEDS ASSESSMENTS    Genetics  The patient's new diagnosis and family history have  "been reviewed for genetic counseling needs. A genetic referral is not recommended.     Psychosocial  The patient has completed a PHQ-9 Depression Screening and the Distress Thermometer (DT) today.   PHQ-9 results show 0 (No Depression). The patient scored their distress today as 3 on a scale of 0-10 with 0 being no distress and 10 being extreme distress.   Problems marked by the patient as being an issue for them within the last week include emotional problems.   Results were reviewed along with psychosocial resources offered by our cancer center.  Our oncology social worker will be flagged for a DT score of 4 or above, and a same day call will be made for a score of 9 or 10.  A mental health referral is offered at this time. The patient is not interested in a referral to [facility].   Copies of patient's questionnaires will be scanned into EMR for details and further reference.    Barriers to care  A barriers form was also completed by the patient today. We discussed services offered by our facility to help him have adequate access to care. The patient was given the name and contact information for our Oncology Social Worker, Xiomara Osorio.  Based upon barriers assessment today, the patient will not require a follow-up call from the  to further discuss needs.   A copy of the barriers form will also be scanned into EMR for details and further reference.     VAD Assessment  The patient and I discussed planned intervenous chemotherapy as well as other IV treatments that are often needed throughout the course of treatment. These may include, but are not limited to blood transfusions, antibiotics, and IV hydration. The vasculature does appear to be adequate for multiple peripheral IVs throughout their treatment course.     Advanced Care Planning  The patient and I discussed advanced care planning, \"Conversations that Matter\".   This service was offered, free of charge, for development of advance " directives with a certified ACP facilitator.  The patient does not have an up-to-date advanced directive. This document is not on file with our office. The patient is not interested in an appointment with one of our facilitators to create or update their advanced directives.      Palliative Care  The patient and I discussed palliative care services. Palliative care is not the same as Hospice care. This is specialized medical care for people living with serious illness with the goal of improving quality of life for the patient and their family. [facility] offers our patients outpatient palliative care early along with their treatment to assist in coordination of care, symptom management, pain management, and medical decision making.  Oncology criteria for palliative care referral is not met at this time. The patient is not interested in a palliative care consultation.     Additional Referral needs  none      CHEMOTHERAPY EDUCATION    Chemotherapy education will be completed today and consent obtained per oncology pharmacist.  See their documentation for further details.    Booklets Given: Chemotherapy and You [x]  Nutrition for the Patient with Cancer During Treatment [x]      Chemotherapy Regimen:   Treatment Plans       Name Type Plan Dates Plan Provider         Active    OP MULTIPLE MYELOMA Daratumumab / Lenalidomide / Bortezomib / Dexamethasone (INDUCTION)  ONCOLOGY TREATMENT 7/14/2025 - Present Seda Fritz MD                 Assessment and Plan:    Diagnoses and all orders for this visit:    1. Multiple myeloma not having achieved remission (Primary)      The patient and I have reviewed their cancer diagnosis and scheduled treatment plan. Needs assessment was completed including genetics, psychosocial needs, barriers to care, VAD evaluation, advanced care planning, and palliative care services. Referrals have been ordered as appropriate based upon our evaluation and patient desires.     Chemotherapy teaching  will be completed today per pharmacy.  Patient and wife are aware of their care team members and contact information if they have questions or problems throughout the treatment course. The patient is adequately prepared to begin treatment as scheduled on 7/30/2025.     Reviewed with patient education regarding Bactrim, acyclovir, dexamethasone and Zofran prescriptions sent to pharmacy.       Patient will have pretreatment labs drawn on 7/29/2025.    He is scheduled for PET scan on 7/29/2025 and will start treatment on 7/30/2025.     I spent 30 minutes caring for Alvin on this date of service. This time includes time spent by me in the following activities: preparing for the visit, reviewing tests, counseling and educating the patient/family/caregiver, ordering medications, tests, or procedures, referring and communicating with other health care professionals, documenting information in the medical record, and care coordination.     Isabel Kohli, APRN  07/25/25

## 2025-07-25 NOTE — PROGRESS NOTES
Specialty Pharmacy Patient Management Program  Oncology Initial Assessment       Alvin Gordno is a 44 y.o. male with kappa free light chain multiple myeloma seen by an Oncology provider and enrolled in the Oncology Patient Management program offered by [facility].  An initial outreach was conducted, including assessment of therapy appropriateness and specialty medication education for Revlimid (lenalidomide). The patient was introduced to services offered by [facility], including: regular assessments, refill coordination, curbside pick-up or mail order delivery options, prior authorization maintenance, and financial assistance programs as applicable. The patient was also provided with contact information for the pharmacy team.     Regimen:   -Revlimid (lenalidomide) 10 mg capsule - Take 1 capsule by mouth daily on days 1-14, then off for 7 days in a 21-day cycle  -Darzalex Faspro (daratumumab) 1,800 mg/30,000 units subQ on days 1, 8, and 15 in a 21-day cycle  -Velcade (bortezomib) 1.3 mg/m2 subQ on days 1, 4, 8, and 11 in a 21-day cycle  -Decadron (dexamethasone) 4 mg tablet - Take 5 tablets by mouth on days 1, 2, 8, 9, 15, and 16    Start date of oral specialty medication: Cycle 1 Day is scheduled on 7/30/25. The patient has not received lenalidomide from [email or web address] yet, but lenalidomide is currently out for delivery via [facility]. The patient's copay for lenalidomide is $1.67 with [email or web address].     Relevant Past Medical History, Comorbidities, and Vaccines  Relevant medical history and concomitant health conditions were discussed with the patient. The patient's chart has been reviewed for relevant past medical history and comorbid health conditions and updated as necessary.  Vaccines are coordinated by the patient's oncologist and primary care provider.  Past Medical History:   Diagnosis Date    Anxiety     Back pain     Herpes simplex     Migraine headache     Slow  to wake up after anesthesia     Wears contact lenses      Social History     Socioeconomic History    Marital status:    Tobacco Use    Smoking status: Never    Smokeless tobacco: Never   Substance and Sexual Activity    Alcohol use: Yes     Comment: occasionally    Drug use: No    Sexual activity: Defer     Allergies  Known allergies and reactions were discussed with the patient. The patient's chart has been reviewed for allergy information and updated as necessary.   No Known Allergies     Current Medication List  This medication list has been reviewed with the patient and evaluated for any interactions or necessary modifications/recommendations, and updated to include all prescription medications, OTC medications, and supplements the patient is currently taking.  This list reflects what is contained in the patient's profile, which has also been marked as reviewed to communicate to other providers it is the most up to date version of the patient's current medication therapy.   Prior to Admission medications    Medication Sig Start Date End Date Taking? Authorizing Provider   lenalidomide (REVLIMID) 10 MG capsule Take 1 capsule by mouth Daily. On days 1-14, then off for 7 days in a 21-day cycle. Adult male patient, REMS [id number]. 7/24/25   Seda Fritz MD   losartan (COZAAR) 25 MG tablet Take 1 tablet by mouth Daily.    Provider, MD Tamra     Drug Interactions  Reviewed concomitant medications, allergies, labs, comorbidities/medical history, quality of life, and immunization history.   Drug-drug interactions noted and discussed during education:   Category D: dexamethasone may increase the thrombogenic effects of lenalidomide (counseled the patient to start taking OTC aspirin 81 mg PO daily to reduce the risk of blood clots with lenalidomide during education)   Category C: Increased risk of hypotension with losartan and bortezomib (monitor for dizziness, low BP)  Category C: trimethoprim in Bactrim  DS may increase the risk of hyperkalemia with losartan (will monitor potassium on CMP during treatment)   Reminded the patient to let us know before making any changes or starting any new prescription or OTC medications so we can first assess drug interactions.  Drug-food interactions noted and discussed during education. Patient was instructed to avoid eating grapefruit and drinking grapefruit juice    Recommended Medications Assessment  Bone Health (such as calcium/vitamin D, bisphosphonate, RANKL inhibitor) - Indicated, not currently taking The patient will start taking Calcium and Vitamin D - Recommended OTC Caltrate-D - 1 tablet by mouth twice daily with food  VTE prophylaxis - Indicated, not currently taking - The patient will start taking Aspirin 81 mg PO daily  Prophylactic antimicrobials - Indicated, not currently taking The patient will start taking Antiviral: acyclovir 400 mg PO BID and PJP Prophylaxis: Bactrim DS Take 1 tablet PO on Mondays, Wednesdays, and Fridays.   Tumor lysis syndrome prophylaxis - Not Indicated     Relevant Laboratory Values  Lab Results   Component Value Date    GLUCOSE 95 07/09/2025    CALCIUM 9.2 07/09/2025     07/09/2025    K 4.0 07/09/2025    CO2 25.0 07/09/2025     07/09/2025    BUN 6.7 07/09/2025    CREATININE 1.83 (H) 07/09/2025    EGFRIFAFRI 64 03/15/2021    EGFRIFNONA 55 (L) 03/15/2021    BCR 3.7 (L) 07/09/2025    ANIONGAP 10.0 07/09/2025     Lab Results   Component Value Date    WBC 5.26 07/21/2025    RBC 3.40 (L) 07/21/2025    HGB 12.0 (L) 07/21/2025    HCT 34.5 (L) 07/21/2025    .5 (H) 07/21/2025    MCH 35.3 (H) 07/21/2025    MCHC 34.8 07/21/2025    RDW 14.5 07/21/2025    RDWSD 52.2 07/21/2025    MPV 9.4 07/21/2025     07/21/2025    NEUTRORELPCT 48.2 07/21/2025    LYMPHORELPCT 37.5 07/21/2025    MONORELPCT 8.4 07/21/2025    EOSRELPCT 2.3 07/21/2025    BASORELPCT 0.4 07/21/2025    AUTOIGPER 3.2 (H) 07/21/2025    NEUTROABS 2.54 07/21/2025     LYMPHSABS 1.97 07/21/2025    MONOSABS 0.44 07/21/2025    EOSABS 0.12 07/21/2025    BASOSABS 0.02 07/21/2025    AUTOIGNUM 0.17 (H) 07/21/2025    NRBC 0.0 07/21/2025     The above labs have been reviewed. The patient's most recent labs were reviewed and the medication has been dose adjusted based on renal function. The patient's calculated CrCl is ~52.6 mL/min (adjBW). The package insert recommends using lenalidomide 10 mg instead of lenalidomide 25 if CrCl is 30-60 mL/min with the option of increasing to lenalidomide 15 mg after 2 cycles if tolerating treatment well.     Initial Education Provided for Specialty Medication  The patient has been provided with the following education. All questions and concerns have been addressed prior to the patient receiving the medication, and the patient has verbalized understanding of the education and any materials provided.  Additional patient education shall be provided and documented upon request by the patient, provider or payer.      Provided patient with:   Chemo calendar to help improve adherence., Education sheets about the medication, 24-hour clinic phone number and my contact information and instructions to call should additional questions arise.     Medication Education Sheets Provided: (select all that apply)  Oral Specialty Medication: Revlimid (lenalidomide)  SubQ: Bortezomib and Daratumumab-hyaluronidase  Steroid: Dexamethasone    Other Education Sheets Provided: (select all that apply)  Adherence, CINV, Constipation, Diarrhea, Symptom Tracker Sheet, and Proper Disposal Information    TOPICS COMMENTS   Storage and Handling of Oral Specialty Medication Store in the original container, in a dry location out of direct sunlight, and out of reach of children or pets. Store at room temperature. Discussed safe handling and what to do with any unused medication.   Administration of Oral Specialty Medication Take dexamethasone with food at the same time each day. Take  lenalidomide with or without food at the same time(s) each day. Do not crush or chew dexamethasone tablets. Do not open or dissolve lenalidomide capsules, unless otherwise instructed by the pharmacist.   Adherence to Oral Specialty Regimen and Handling Missed Doses Patient is likely to have good treatment adherence; reinforced the importance of adherence. Reviewed how to address missed doses and encouraged the patient to let us know of any missed doses.   Anemia: role of RBC, cause, s/s, ways to manage, role of transfusion Reviewed the role of RBC and the use of transfusions if hemoglobin decreases too much.  Patient to notify us if he experiences shortness of breath, dizziness, or palpitations.  Also let patient know that he could feel more tired than usual and to try to stay active, but rest if he needs to.    Thrombocytopenia: role of platelet, cause, s/s, ways to prevent bleeding, things to avoid, when to seek help Reviewed the role of platelets in blood clotting and when to call clinic (bloody nose that bleeds for 5 minutes despite pressure, a cut that won't stop bleeding despite pressure, gums that bleed excessively with brushing or flossing). Recommended using an electric razor, soft bristle toothbrush, and blowing your nose gently.    Neutropenia: role of WBC, cause, infection precautions, s/s of infection, when to call MD Reviewed the role of WBC, good infection prevention practices, and when to call the clinic (temperature 100.4F, sore throat, burning urination, etc).   Nutrition and Appetite Changes:  importance of maintaining healthy diet & weight, ways to manage to improve intake, dietary consult, exercise regimen, electrolyte and/or blood glucose abnormalities Increased Appetite: Discussed the possibility of increased appetite with high-dose steroids. Also reviewed the importance of taking steroids with food to avoid stomach upset.   Steroid-Induced Heartburn: This can occur with high-dose steroids.  Instructed the patient to notify the clinic if it becomes problematic. Omeprazole 20 mg PO daily as needed for heartburn was started to prevent heartburn. Increased Blood Sugar: This patient's oncology therapy can increase blood sugar.  Recommended that the patient monitor blood sugar more closely and contact their primary care provider for management recommendations if blood glucose values start trending upward.   Diarrhea: causes, s/s of dehydration, ways to manage, dietary changes, when to call MD Discussed the risk of diarrhea. Instructed patient on use OTC loperamide with diarrhea onset, but emphasized the importance of calling the clinic if 4-6 episodes in 24 hours not relieved by OTC loperamide.   Constipation: causes, ways to manage, dietary changes, when to call MD Provided supplementary handout with instructions for use of docusate and other OTC therapies to manage constipation.  Patient was instructed to call us if medications aren't working.   Nausea/Vomiting: cause, use of antiemetics, dietary changes, when to call MD Emetic risk: Low-Minimal  PRN home meds: Ondansetron 8mg PO every 8 hours PRN nausea / vomiting  Pharmacy home meds sent to: Prescription has not been sent yet. The patient has a needs assessment with Isabel batista.     Instructed the patient to take a dose of the PRN medication at the first onset of nausea and if it's not working to call us for additional medications.  Also provided non-drug measures to mitigate nausea.   Nervous System Changes: causes, s/s, neuropathies, cognitive changes, ways to manage Neurologic Problems: Patient encouraged to call the clinic if they have trouble speaking, memory loss, confusion, seizure, problems walking or with balance, weakness/numbness in extremities, tremors, headache, or loss of consciousness., Peripheral Neuropathy: Discussed the adverse effect of peripheral neuropathy and signs/symptoms associated with this adverse effect. Instructed patient to  call if symptoms become more frequent or worsen. , and Steroids: Discussed the impact of high-dose steroids on the nervous system, such as insomnia, agitation, and emotional liability.   Pain: causes, ways to manage Discussed the possibility of pain with bortezomib    Skin/Nail Changes: cause, s/s, ways to manage Generalized Rash: Discussed the potential for a rash or itchy skin, offered nonpharmacologic prevention strategies, and instructed the patient to call if a rash develops and worsens.   Organ Toxicities: cause, s/s, need for diagnostic tests, labs, when to notify MD Discussed potential effects on organ systems, monitoring, diagnostic tests, labs, and when to notify the MD. Discussed the signs/symptoms of the following: cardiotoxicity, hepatotoxicity, and skin changes.   Infusion-Related Reactions or Injection-Site Reaction: Cause, s/s, anaphylaxis, monitoring, etc. Premeds: Methylprednisolone, Acetaminophen, and Diphenhydramine. Will also receive montelukast prior to Cycle 1 Day 1.    Discussed the risk of an infusion reaction and symptoms such as: fever, chills, dizziness, itchiness or rash, flushing, trouble breathing, wheezing, sudden back pain, or feeling faint.  Instructed the patient to notify his nurse if he starts feeling weird at any point during the infusion.    For daratumumab: Discussed the possibility of injection-site reaction and the need for mandatory monitoring at the infusion center and Monitoring will occur for 2 hours after the first dose and 30 minutes after the 2nd and 3rd doses.  No required monitoring for doses 4 and beyond if previous doses were well tolerated. Reviewed how injection-site reactions are managed.   Survivorship: distress, distress assessment, secondary malignancies, early/late effects, follow-up, social issues, social support Discussed the rare, but possible risk of secondary malignancies months to years after treatment, most commonly acute myeloid leukemia.    Miscellaneous Financial Issues: None identified. $1.67 copay for lenalidomide at [facility] . Majo Lo is the Pharmacy Care Coordinator.  Lab Draws: On or before days 1, 8, and 15 of each cycle, no sooner than 3 days early.   Blood Clots: Explained the rare, but possible risk of DVT or PE.  Reviewed the signs and symptoms of VTE and stressed the urgency to call 911 immediately. Patient will start aspirin 81 mg PO daily to minimize the risk of blood clots.   Infertility and Sexuality:  causes, fertility preservation options, sexuality changes, ways to manage, importance of birth control IV Oncology Therapy: Reviewed safe sex practices and the importance of minimizing exposure to body fluids for 7 days after each dose of IV oncology therapy. Oral Oncology Therapy: Reviewed safe sex practices and the importance of minimizing exposure to body fluids while on oral oncology therapy. The patient is sexually active with a woman of childbearing potential.  Two methods of birth control were recommended, including vasectomy and condoms.   Home Care: how to manage bodily fluids Counseled on management of soiled linens and proper flush technique.  Discussed how to manage all the side effects at home and advised when to contact the MD office.     Adherence and Self-Administration  Barriers to Patient Adherence and/or Self-Administration: None identified  Methods for Supporting Patient Adherence and/or Self-Administration: Dosing calendar  Expected duration of therapy: Planning for 4 cycles of induction therapy     Goals of Therapy  Patient Goals of Therapy:   Consistently take medications as prescribed  Patient will adhere to medication regimen  Patient will report any medication side effects to healthcare provider  Clinical Goals:    Goals Addressed Today        Specialty Pharmacy General Goal      Clinical goal/therapeutic target: disease control, per the recent oncology clinic notes and labs.  -7/24/25: On enrollment,  Dr. Fritz recommends starting lenalidomide, daratumumab, bortezomib, and dexamethasone for 4 cycles for kappa free light chain multiple myeloma on 7/30/25.              Support patient understanding of medication regimen  Ensure patient knows the pharmacy contact information    Reassessment Plan & Follow-Up  Pharmacist to perform regular reassessments no more than (6) months from the previous assessment.  Welcome information and patient satisfaction survey to be sent by retail team with patient's initial fill.  Care Coordinator to set up future refill outreaches, coordinate prescription delivery, and escalate clinical questions to pharmacist.     Additional Plans, Therapy Recommendations or Therapy Problems to Be Addressed:   -Cycle 1 Day is scheduled on 7/30/25. The patient has not received lenalidomide from [facility] yet, but lenalidomide is currently out for delivery via [employer]. The patient's copay for lenalidomide is $1.67 with [facility].     Attestation  I attest the patient was actively involved in and has agreed to the above plan of care. If the prescribed therapy is at any point deemed not appropriate based on the current or future assessments, a consultation will be initiated with the patient's specialty care provider to determine the best course of action. The revised plan of therapy will be documented along with any required assessments and/or additional patient education provided.     Milla Jay, Luisa  Clinic Oncology Pharmacy Specialist  [phone]    Date and Time: 7/25/2025  08:04 EDT

## 2025-07-28 NOTE — PROGRESS NOTES
Specialty Pharmacy Patient Management Program       Patient received Revlimid from [facility] on 7/25/2025. Patient will take first dose on 7/30/2025.                    Majo Lo, Pharmacy Care Coordinator   7/28/2025  10:59 EDT

## 2025-07-29 ENCOUNTER — LAB (OUTPATIENT)
Facility: HOSPITAL | Age: 45
End: 2025-07-29
Payer: COMMERCIAL

## 2025-07-29 ENCOUNTER — HOSPITAL ENCOUNTER (OUTPATIENT)
Facility: HOSPITAL | Age: 45
Discharge: HOME | End: 2025-07-29
Payer: COMMERCIAL

## 2025-07-29 ENCOUNTER — APPOINTMENT (OUTPATIENT)
Dept: ONCOLOGY | Facility: HOSPITAL | Age: 45
End: 2025-07-29
Payer: COMMERCIAL

## 2025-07-29 VITALS
BODY MASS INDEX: 29.86 KG/M2 | SYSTOLIC BLOOD PRESSURE: 130 MMHG | RESPIRATION RATE: 18 BRPM | HEIGHT: 66 IN | TEMPERATURE: 98 F | DIASTOLIC BLOOD PRESSURE: 83 MMHG | HEART RATE: 77 BPM | WEIGHT: 185.8 LBS

## 2025-07-29 DIAGNOSIS — N18.30 STAGE 3 CHRONIC KIDNEY DISEASE, UNSPECIFIED WHETHER STAGE 3A OR 3B CKD: ICD-10-CM

## 2025-07-29 DIAGNOSIS — N29 MONOCLONAL GAMMOPATHY OF RENAL SIGNIFICANCE (MGRS): ICD-10-CM

## 2025-07-29 DIAGNOSIS — D89.2 PARAPROTEINEMIA: ICD-10-CM

## 2025-07-29 DIAGNOSIS — D47.2 MONOCLONAL GAMMOPATHY OF RENAL SIGNIFICANCE (MGRS): ICD-10-CM

## 2025-07-29 LAB
ABO GROUP BLD: NORMAL
ALBUMIN SERPL-MCNC: 4.8 G/DL (ref 3.5–5.2)
ALBUMIN/GLOB SERPL: 2.3 G/DL
ALP SERPL-CCNC: 79 U/L (ref 39–117)
ALT SERPL W P-5'-P-CCNC: 47 U/L (ref 1–41)
ANION GAP SERPL CALCULATED.3IONS-SCNC: 10 MMOL/L (ref 5–15)
AST SERPL-CCNC: 40 U/L (ref 1–40)
BASOPHILS # BLD AUTO: 0.02 10*3/MM3 (ref 0–0.2)
BASOPHILS NFR BLD AUTO: 0.4 % (ref 0–1.5)
BILIRUB SERPL-MCNC: 0.3 MG/DL (ref 0–1.2)
BLD GP AB SCN SERPL QL: NEGATIVE
BUN SERPL-MCNC: 12.1 MG/DL (ref 6–20)
BUN/CREAT SERPL: 7.3 (ref 7–25)
CALCIUM SPEC-SCNC: 9 MG/DL (ref 8.6–10.5)
CHLORIDE SERPL-SCNC: 109 MMOL/L (ref 98–107)
CO2 SERPL-SCNC: 21 MMOL/L (ref 22–29)
CREAT SERPL-MCNC: 1.66 MG/DL (ref 0.76–1.27)
DEPRECATED RDW RBC AUTO: 53.5 FL (ref 37–54)
EGFRCR SERPLBLD CKD-EPI 2021: 51.8 ML/MIN/1.73
EOSINOPHIL # BLD AUTO: 0.12 10*3/MM3 (ref 0–0.4)
EOSINOPHIL NFR BLD AUTO: 2.2 % (ref 0.3–6.2)
ERYTHROCYTE [DISTWIDTH] IN BLOOD BY AUTOMATED COUNT: 14.4 % (ref 12.3–15.4)
GLOBULIN UR ELPH-MCNC: 2.1 GM/DL
GLUCOSE BLDC GLUCOMTR-MCNC: 99 MG/DL (ref 70–130)
GLUCOSE SERPL-MCNC: 96 MG/DL (ref 65–99)
HCT VFR BLD AUTO: 37.5 % (ref 37.5–51)
HGB BLD-MCNC: 13 G/DL (ref 13–17.7)
IMM GRANULOCYTES # BLD AUTO: 0.11 10*3/MM3 (ref 0–0.05)
IMM GRANULOCYTES NFR BLD AUTO: 2 % (ref 0–0.5)
LYMPHOCYTES # BLD AUTO: 1.89 10*3/MM3 (ref 0.7–3.1)
LYMPHOCYTES NFR BLD AUTO: 35.2 % (ref 19.6–45.3)
MCH RBC QN AUTO: 34.9 PG (ref 26.6–33)
MCHC RBC AUTO-ENTMCNC: 34.7 G/DL (ref 31.5–35.7)
MCV RBC AUTO: 100.8 FL (ref 79–97)
MONOCYTES # BLD AUTO: 0.47 10*3/MM3 (ref 0.1–0.9)
MONOCYTES NFR BLD AUTO: 8.8 % (ref 5–12)
NEUTROPHILS NFR BLD AUTO: 2.76 10*3/MM3 (ref 1.7–7)
NEUTROPHILS NFR BLD AUTO: 51.4 % (ref 42.7–76)
PLATELET # BLD AUTO: 244 10*3/MM3 (ref 140–450)
PMV BLD AUTO: 9.5 FL (ref 6–12)
POTASSIUM SERPL-SCNC: 4.3 MMOL/L (ref 3.5–5.2)
PROT SERPL-MCNC: 6.9 G/DL (ref 6–8.5)
RBC # BLD AUTO: 3.72 10*6/MM3 (ref 4.14–5.8)
RH BLD: POSITIVE
SODIUM SERPL-SCNC: 140 MMOL/L (ref 136–145)
T&S EXPIRATION DATE: NORMAL
T-UPTAKE NFR SERPL: 1.25 TBI (ref 0.8–1.3)
T4 SERPL-MCNC: 6.73 MCG/DL (ref 4.5–11.7)
TSH SERPL DL<=0.05 MIU/L-ACNC: 2.54 UIU/ML (ref 0.27–4.2)
WBC NRBC COR # BLD AUTO: 5.37 10*3/MM3 (ref 3.4–10.8)

## 2025-07-29 PROCEDURE — 84436 ASSAY OF TOTAL THYROXINE: CPT | Performed by: INTERNAL MEDICINE

## 2025-07-29 PROCEDURE — 86901 BLOOD TYPING SEROLOGIC RH(D): CPT | Performed by: INTERNAL MEDICINE

## 2025-07-29 PROCEDURE — 82948 REAGENT STRIP/BLOOD GLUCOSE: CPT

## 2025-07-29 PROCEDURE — 34310000005 FLUDEOXYGLUCOSE F18 SOLUTION: Performed by: INTERNAL MEDICINE

## 2025-07-29 PROCEDURE — 78815 PET IMAGE W/CT SKULL-THIGH: CPT

## 2025-07-29 PROCEDURE — A9552 F18 FDG: HCPCS | Performed by: INTERNAL MEDICINE

## 2025-07-29 PROCEDURE — 86900 BLOOD TYPING SEROLOGIC ABO: CPT | Performed by: INTERNAL MEDICINE

## 2025-07-29 PROCEDURE — 36415 COLL VENOUS BLD VENIPUNCTURE: CPT

## 2025-07-29 PROCEDURE — 84479 ASSAY OF THYROID (T3 OR T4): CPT | Performed by: INTERNAL MEDICINE

## 2025-07-29 PROCEDURE — 80050 GENERAL HEALTH PANEL: CPT | Performed by: INTERNAL MEDICINE

## 2025-07-29 PROCEDURE — 86850 RBC ANTIBODY SCREEN: CPT | Performed by: INTERNAL MEDICINE

## 2025-07-29 RX ADMIN — FLUDEOXYGLUCOSE F18 1 DOSE: 300 INJECTION INTRAVENOUS at 08:51

## 2025-07-30 ENCOUNTER — DOCUMENTATION (OUTPATIENT)
Dept: NUTRITION | Facility: HOSPITAL | Age: 45
End: 2025-07-30
Payer: COMMERCIAL

## 2025-07-30 ENCOUNTER — DOCUMENTATION (OUTPATIENT)
Dept: OTHER | Facility: HOSPITAL | Age: 45
End: 2025-07-30
Payer: COMMERCIAL

## 2025-07-30 ENCOUNTER — HOSPITAL ENCOUNTER (OUTPATIENT)
Dept: ONCOLOGY | Facility: HOSPITAL | Age: 45
Discharge: HOME | End: 2025-07-30
Admitting: INTERNAL MEDICINE
Payer: COMMERCIAL

## 2025-07-30 VITALS
WEIGHT: 187 LBS | RESPIRATION RATE: 18 BRPM | TEMPERATURE: 97.8 F | DIASTOLIC BLOOD PRESSURE: 66 MMHG | HEART RATE: 73 BPM | HEIGHT: 66 IN | BODY MASS INDEX: 30.05 KG/M2 | SYSTOLIC BLOOD PRESSURE: 108 MMHG

## 2025-07-30 DIAGNOSIS — D47.2 MONOCLONAL GAMMOPATHY OF RENAL SIGNIFICANCE (MGRS): Primary | ICD-10-CM

## 2025-07-30 DIAGNOSIS — N29 MONOCLONAL GAMMOPATHY OF RENAL SIGNIFICANCE (MGRS): Primary | ICD-10-CM

## 2025-07-30 PROCEDURE — 25010000002 BORTEZOMIB PER 0.1 MG: Performed by: INTERNAL MEDICINE

## 2025-07-30 PROCEDURE — 25010000002 METHYLPREDNISOLONE PER 125 MG: Performed by: INTERNAL MEDICINE

## 2025-07-30 PROCEDURE — 96375 TX/PRO/DX INJ NEW DRUG ADDON: CPT

## 2025-07-30 PROCEDURE — 96401 CHEMO ANTI-NEOPL SQ/IM: CPT

## 2025-07-30 PROCEDURE — 96374 THER/PROPH/DIAG INJ IV PUSH: CPT

## 2025-07-30 PROCEDURE — 25010000002 DARATUMUMAB-HYALURONIDASE-FIHJ 1800-30000 MG-UT/15ML SOLUTION: Performed by: INTERNAL MEDICINE

## 2025-07-30 PROCEDURE — 25810000003 SODIUM CHLORIDE 0.9 % SOLUTION: Performed by: INTERNAL MEDICINE

## 2025-07-30 PROCEDURE — 25010000002 DIPHENHYDRAMINE PER 50 MG: Performed by: INTERNAL MEDICINE

## 2025-07-30 RX ORDER — METHYLPREDNISOLONE SODIUM SUCCINATE 125 MG/2ML
100 INJECTION INTRAMUSCULAR; INTRAVENOUS ONCE
Status: COMPLETED | OUTPATIENT
Start: 2025-07-30 | End: 2025-07-30

## 2025-07-30 RX ORDER — HYDROCORTISONE SODIUM SUCCINATE 100 MG/2ML
100 INJECTION INTRAMUSCULAR; INTRAVENOUS AS NEEDED
Status: DISCONTINUED | OUTPATIENT
Start: 2025-07-30 | End: 2025-07-31 | Stop reason: HOSPADM

## 2025-07-30 RX ORDER — SODIUM CHLORIDE 9 MG/ML
20 INJECTION, SOLUTION INTRAVENOUS ONCE
Status: COMPLETED | OUTPATIENT
Start: 2025-07-30 | End: 2025-07-30

## 2025-07-30 RX ORDER — ACETAMINOPHEN 500 MG
1000 TABLET ORAL ONCE
Status: COMPLETED | OUTPATIENT
Start: 2025-07-30 | End: 2025-07-30

## 2025-07-30 RX ORDER — FAMOTIDINE 10 MG/ML
20 INJECTION, SOLUTION INTRAVENOUS AS NEEDED
Status: DISCONTINUED | OUTPATIENT
Start: 2025-07-30 | End: 2025-07-31 | Stop reason: HOSPADM

## 2025-07-30 RX ORDER — MONTELUKAST SODIUM 10 MG/1
10 TABLET ORAL ONCE
Status: COMPLETED | OUTPATIENT
Start: 2025-07-30 | End: 2025-07-30

## 2025-07-30 RX ORDER — DIPHENHYDRAMINE HYDROCHLORIDE 50 MG/ML
50 INJECTION, SOLUTION INTRAMUSCULAR; INTRAVENOUS AS NEEDED
Status: DISCONTINUED | OUTPATIENT
Start: 2025-07-30 | End: 2025-07-31 | Stop reason: HOSPADM

## 2025-07-30 RX ORDER — BORTEZOMIB 3.5 MG/1
1.3 INJECTION, POWDER, LYOPHILIZED, FOR SOLUTION INTRAVENOUS; SUBCUTANEOUS ONCE
Status: COMPLETED | OUTPATIENT
Start: 2025-07-30 | End: 2025-07-30

## 2025-07-30 RX ADMIN — DARATUMUMAB AND HYALURONIDASE-FIHJ (HUMAN RECOMBINANT) 1800 MG: 1800; 30000 INJECTION SUBCUTANEOUS at 09:53

## 2025-07-30 RX ADMIN — DIPHENHYDRAMINE HYDROCHLORIDE 50 MG: 50 INJECTION INTRAMUSCULAR; INTRAVENOUS at 08:38

## 2025-07-30 RX ADMIN — ACETAMINOPHEN 1000 MG: 500 TABLET, FILM COATED ORAL at 08:34

## 2025-07-30 RX ADMIN — MONTELUKAST 10 MG: 10 TABLET, FILM COATED ORAL at 08:34

## 2025-07-30 RX ADMIN — SODIUM CHLORIDE 20 ML/HR: 9 INJECTION, SOLUTION INTRAVENOUS at 08:35

## 2025-07-30 RX ADMIN — METHYLPREDNISOLONE SODIUM SUCCINATE 100 MG: 125 INJECTION, POWDER, FOR SOLUTION INTRAMUSCULAR; INTRAVENOUS at 08:35

## 2025-07-30 RX ADMIN — BORTEZOMIB 2.5 MG: 3.5 INJECTION, POWDER, LYOPHILIZED, FOR SOLUTION INTRAVENOUS; SUBCUTANEOUS at 09:27

## 2025-07-30 NOTE — PROGRESS NOTES
"Outpatient Oncology Nutrition     Reason for Visit: Oncology Nutrition Screening and Patient Education / Met with patient and his wife during his initial chemotherapy infusion appointment.     Patient Name:  Alvin Gordon    :  1980    MRN:  [id number]    Date of Encounter: 2025    Nutrition Assessment     Diagnosis: multiple myeloma     Chemotherapy: OP MULTIPLE MYELOMA Daratumumab / Lenalidomide / Bortezomib / Dexamethasone (INDUCTION) - 21 day cycle x 4 cycles / start date 25     Patient Active Problem List:    Patient Active Problem List   Diagnosis    Anxiety    Headache, migraine    Pars defect of lumbar spine    S/P lumbar fusion    Monoclonal gammopathy of renal significance (MGRS)    Multiple myeloma not having achieved remission       Food / Nutrition Related History   Patient reports establishing care with a Nephrologist a few months ago and was told to follow a low protein diet but was not given any guidelines.  Patient states his appetite has been good but has been trying to eat healthier since his diagnosis.     Hydration Status   Discussed the importance of hydration, reviewed hydrating fluids, and recommended he continue to drink water throughout his day.    Goal: 88 - 96 ounces     Enteral Feeding       Anthropometric Measurements     Height:    Ht Readings from Last 1 Encounters:   25 167.6 cm (66\")       Weight:    Wt Readings from Last 1 Encounters:   25 84.8 kg (187 lb)       BMI:  30.2 - Obese    Weight Change: weight has been in a stable range     Review of Lab Data (Time Frame - 1 month / 2 month)   Labs reviewed - 25     Medication Review   MAR reviewed - Prilosec, Zofran, and Dexamethasone noted     Nutrition Focused Physical Findings       Nutrition Impact Symptoms   No problems with eating reported at this time     Physical Activity   Normal with no limitations    Current Nutritional Intake     Oral diet:  Regular      Intake: oral intake has been " normal     Malnutrition Risk Assessment     Recent weight loss over the past 6 months:  0 = No    Eating poorly because of a decreased appetite:  0 = No    Malnutrition Screening Score:     MST = 0 or 1 Patient not at risk for malnutrition    Nutrition Diagnosis     Problem    Etiology    Signs / Symptoms      Nutrition Intervention   Discussed the importance of good nutrition during his treatment course focusing on adequate calorie, protein, nutrient and fluid intake.  Advised him to be consuming 3 meals daily but if he experiences nausea recommended he eat smaller more frequent meals/snacks throughout the day to aid with symptom management.  Emphasized the importance of protein and its role in the diet; reviewed high protein foods; and recommended he have a protein source at each meal/snack.  Advised him to be consuming a moderate amount of protein in his diet (84 - 97 grams protein / day = 1.3 - 1.5 grams per kg IBW).  Encouraged him to be eating a healthy well balanced diet that include a wide variety of fruits, vegetables, whole grains, nuts, seeds, beans, legumes, lean meats, and low fat dairy.    Written nutrition resources provided -  High Protein Foods List     Goal   To achieve adequate nutritional and hydration intake.  To aid with nutrition impact symptom management as needed.     Monitoring / Evaluation   Answered their questions and both voiced understanding of information discussed.  RD's contact information provided and encouraged to call with questions.  Will monitor as needed during his treatment course.    Pily Lutz MS, RD, LD

## 2025-07-30 NOTE — PROGRESS NOTES
SW met with pt and his supportive spouse in infusion. Pt reported he is doing well, and denied any needs at this time. SW provided education on role and resources available, and provided contact information for ongoing support and assistance. Pt thanked SKIP for the visit and was agreeable to reach out as needed.

## 2025-07-31 ENCOUNTER — TELEPHONE (OUTPATIENT)
Age: 45
End: 2025-07-31
Payer: COMMERCIAL

## 2025-07-31 DIAGNOSIS — D47.2 MONOCLONAL GAMMOPATHY OF RENAL SIGNIFICANCE (MGRS): Primary | ICD-10-CM

## 2025-07-31 DIAGNOSIS — F41.8 SITUATIONAL ANXIETY: ICD-10-CM

## 2025-07-31 DIAGNOSIS — N29 MONOCLONAL GAMMOPATHY OF RENAL SIGNIFICANCE (MGRS): Primary | ICD-10-CM

## 2025-07-31 LAB
CYTO UR: NORMAL
LAB AP ASPIRATE SMEAR: NORMAL
LAB AP CASE REPORT: NORMAL
LAB AP CBC AND DIFFERENTIAL: NORMAL
LAB AP CLINICAL INFORMATION: NORMAL
LAB AP CLOT SECTION: NORMAL
LAB AP CORE BIOPSY: NORMAL
LAB AP DIAGNOSIS COMMENT: NORMAL
LAB AP FLOW CYTOMETRY SUMMARY: NORMAL
LAB AP INTEGRATED ONCOLOGY, ADDENDUM 2: NORMAL
LAB AP INTEGRATED ONCOLOGY, ADDENDUM: NORMAL
PATH REPORT.FINAL DX SPEC: NORMAL
PATH REPORT.GROSS SPEC: NORMAL

## 2025-07-31 RX ORDER — LORAZEPAM 0.5 MG/1
.5-1 TABLET ORAL 2 TIMES DAILY PRN
Qty: 30 TABLET | Refills: 0 | Status: SHIPPED | OUTPATIENT
Start: 2025-07-31

## 2025-07-31 NOTE — TELEPHONE ENCOUNTER
Advised patient Dr. Fritz sent in medication for anxiety and to help him sleep that he can take as needed at night and a referral to [facility] to help manage his anxiety.  Patient verbalized understanding and agreed.

## 2025-08-04 ENCOUNTER — HOSPITAL ENCOUNTER (OUTPATIENT)
Dept: ONCOLOGY | Facility: HOSPITAL | Age: 45
Discharge: HOME | End: 2025-08-04
Admitting: INTERNAL MEDICINE
Payer: COMMERCIAL

## 2025-08-04 ENCOUNTER — DOCUMENTATION (OUTPATIENT)
Dept: NUTRITION | Facility: HOSPITAL | Age: 45
End: 2025-08-04
Payer: COMMERCIAL

## 2025-08-04 VITALS
WEIGHT: 184 LBS | SYSTOLIC BLOOD PRESSURE: 118 MMHG | TEMPERATURE: 97.9 F | BODY MASS INDEX: 29.57 KG/M2 | RESPIRATION RATE: 16 BRPM | HEART RATE: 67 BPM | DIASTOLIC BLOOD PRESSURE: 69 MMHG | HEIGHT: 66 IN

## 2025-08-04 DIAGNOSIS — N29 MONOCLONAL GAMMOPATHY OF RENAL SIGNIFICANCE (MGRS): Primary | ICD-10-CM

## 2025-08-04 DIAGNOSIS — D47.2 MONOCLONAL GAMMOPATHY OF RENAL SIGNIFICANCE (MGRS): Primary | ICD-10-CM

## 2025-08-04 PROCEDURE — 25010000002 BORTEZOMIB PER 0.1 MG: Performed by: INTERNAL MEDICINE

## 2025-08-04 PROCEDURE — 96401 CHEMO ANTI-NEOPL SQ/IM: CPT

## 2025-08-04 RX ORDER — BORTEZOMIB 3.5 MG/1
1.3 INJECTION, POWDER, LYOPHILIZED, FOR SOLUTION INTRAVENOUS; SUBCUTANEOUS ONCE
Status: COMPLETED | OUTPATIENT
Start: 2025-08-04 | End: 2025-08-04

## 2025-08-04 RX ADMIN — BORTEZOMIB 2.5 MG: 3.5 INJECTION, POWDER, LYOPHILIZED, FOR SOLUTION INTRAVENOUS; SUBCUTANEOUS at 13:53

## 2025-08-06 LAB
ABO GROUP BLD: NORMAL
BLD GP AB SCN SERPL QL: NEGATIVE
RH BLD: POSITIVE
T&S EXPIRATION DATE: NORMAL

## 2025-08-08 ENCOUNTER — OFFICE VISIT (OUTPATIENT)
Dept: ONCOLOGY | Facility: CLINIC | Age: 45
End: 2025-08-08
Payer: COMMERCIAL

## 2025-08-08 ENCOUNTER — APPOINTMENT (OUTPATIENT)
Dept: ONCOLOGY | Facility: HOSPITAL | Age: 45
End: 2025-08-08
Payer: COMMERCIAL

## 2025-08-08 ENCOUNTER — HOSPITAL ENCOUNTER (OUTPATIENT)
Dept: ONCOLOGY | Facility: HOSPITAL | Age: 45
Discharge: HOME | End: 2025-08-08
Payer: COMMERCIAL

## 2025-08-08 VITALS
SYSTOLIC BLOOD PRESSURE: 124 MMHG | RESPIRATION RATE: 16 BRPM | OXYGEN SATURATION: 98 % | HEIGHT: 66 IN | TEMPERATURE: 97.7 F | WEIGHT: 186 LBS | HEART RATE: 80 BPM | BODY MASS INDEX: 29.89 KG/M2 | DIASTOLIC BLOOD PRESSURE: 67 MMHG

## 2025-08-08 DIAGNOSIS — D47.2 MONOCLONAL GAMMOPATHY OF RENAL SIGNIFICANCE (MGRS): Primary | ICD-10-CM

## 2025-08-08 DIAGNOSIS — N29 MONOCLONAL GAMMOPATHY OF RENAL SIGNIFICANCE (MGRS): Primary | ICD-10-CM

## 2025-08-08 DIAGNOSIS — C90.00 MULTIPLE MYELOMA NOT HAVING ACHIEVED REMISSION: Primary | ICD-10-CM

## 2025-08-08 LAB
ALBUMIN SERPL-MCNC: 4.9 G/DL (ref 3.5–5.2)
ALBUMIN/GLOB SERPL: 1 G/DL
ALP SERPL-CCNC: 73 U/L (ref 39–117)
ALT SERPL W P-5'-P-CCNC: 65 U/L (ref 1–41)
ANION GAP SERPL CALCULATED.3IONS-SCNC: 12.4 MMOL/L (ref 5–15)
AST SERPL-CCNC: 47 U/L (ref 1–40)
BASOPHILS # BLD AUTO: 0 10*3/MM3 (ref 0–0.2)
BASOPHILS NFR BLD AUTO: 0 % (ref 0–1.5)
BILIRUB SERPL-MCNC: 0.3 MG/DL (ref 0–1.2)
BUN SERPL-MCNC: 9.5 MG/DL (ref 6–20)
BUN/CREAT SERPL: 5.6 (ref 7–25)
CALCIUM SPEC-SCNC: 9.5 MG/DL (ref 8.6–10.5)
CHLORIDE SERPL-SCNC: 107 MMOL/L (ref 98–107)
CO2 SERPL-SCNC: 20.6 MMOL/L (ref 22–29)
CREAT SERPL-MCNC: 1.69 MG/DL (ref 0.76–1.27)
DEPRECATED RDW RBC AUTO: 55.8 FL (ref 37–54)
EGFRCR SERPLBLD CKD-EPI 2021: 50.7 ML/MIN/1.73
EOSINOPHIL # BLD AUTO: 0.04 10*3/MM3 (ref 0–0.4)
EOSINOPHIL NFR BLD AUTO: 1 % (ref 0.3–6.2)
ERYTHROCYTE [DISTWIDTH] IN BLOOD BY AUTOMATED COUNT: 14.4 % (ref 12.3–15.4)
GLOBULIN UR ELPH-MCNC: 5 GM/DL
GLUCOSE SERPL-MCNC: 117 MG/DL (ref 65–99)
HCT VFR BLD AUTO: 35.9 % (ref 37.5–51)
HGB BLD-MCNC: 12.2 G/DL (ref 13–17.7)
IMM GRANULOCYTES # BLD AUTO: 0.02 10*3/MM3 (ref 0–0.05)
IMM GRANULOCYTES NFR BLD AUTO: 0.5 % (ref 0–0.5)
LYMPHOCYTES # BLD AUTO: 0.48 10*3/MM3 (ref 0.7–3.1)
LYMPHOCYTES NFR BLD AUTO: 12.1 % (ref 19.6–45.3)
MCH RBC QN AUTO: 35.3 PG (ref 26.6–33)
MCHC RBC AUTO-ENTMCNC: 34 G/DL (ref 31.5–35.7)
MCV RBC AUTO: 103.8 FL (ref 79–97)
MONOCYTES # BLD AUTO: 0.1 10*3/MM3 (ref 0.1–0.9)
MONOCYTES NFR BLD AUTO: 2.5 % (ref 5–12)
NEUTROPHILS NFR BLD AUTO: 3.34 10*3/MM3 (ref 1.7–7)
NEUTROPHILS NFR BLD AUTO: 83.9 % (ref 42.7–76)
PLATELET # BLD AUTO: 166 10*3/MM3 (ref 140–450)
PMV BLD AUTO: 10.7 FL (ref 6–12)
POTASSIUM SERPL-SCNC: 3.8 MMOL/L (ref 3.5–5.2)
PROT SERPL-MCNC: 9.9 G/DL (ref 6–8.5)
RBC # BLD AUTO: 3.46 10*6/MM3 (ref 4.14–5.8)
SODIUM SERPL-SCNC: 140 MMOL/L (ref 136–145)
WBC NRBC COR # BLD AUTO: 3.98 10*3/MM3 (ref 3.4–10.8)

## 2025-08-08 PROCEDURE — 25010000002 BORTEZOMIB PER 0.1 MG: Performed by: INTERNAL MEDICINE

## 2025-08-08 PROCEDURE — 85025 COMPLETE CBC W/AUTO DIFF WBC: CPT | Performed by: INTERNAL MEDICINE

## 2025-08-08 PROCEDURE — 25010000002 DIPHENHYDRAMINE PER 50 MG: Performed by: INTERNAL MEDICINE

## 2025-08-08 PROCEDURE — 80053 COMPREHEN METABOLIC PANEL: CPT | Performed by: INTERNAL MEDICINE

## 2025-08-08 PROCEDURE — 96375 TX/PRO/DX INJ NEW DRUG ADDON: CPT

## 2025-08-08 PROCEDURE — 25010000002 DARATUMUMAB-HYALURONIDASE-FIHJ 1800-30000 MG-UT/15ML SOLUTION: Performed by: INTERNAL MEDICINE

## 2025-08-08 PROCEDURE — 96374 THER/PROPH/DIAG INJ IV PUSH: CPT

## 2025-08-08 PROCEDURE — 25810000003 SODIUM CHLORIDE 0.9 % SOLUTION: Performed by: INTERNAL MEDICINE

## 2025-08-08 PROCEDURE — 96401 CHEMO ANTI-NEOPL SQ/IM: CPT

## 2025-08-08 PROCEDURE — 25010000002 METHYLPREDNISOLONE PER 125 MG: Performed by: INTERNAL MEDICINE

## 2025-08-08 PROCEDURE — 99214 OFFICE O/P EST MOD 30 MIN: CPT | Performed by: NURSE PRACTITIONER

## 2025-08-08 RX ORDER — BORTEZOMIB 3.5 MG/1
1.3 INJECTION, POWDER, LYOPHILIZED, FOR SOLUTION INTRAVENOUS; SUBCUTANEOUS ONCE
Status: COMPLETED | OUTPATIENT
Start: 2025-08-08 | End: 2025-08-08

## 2025-08-08 RX ORDER — SODIUM CHLORIDE 9 MG/ML
20 INJECTION, SOLUTION INTRAVENOUS ONCE
Status: COMPLETED | OUTPATIENT
Start: 2025-08-08 | End: 2025-08-08

## 2025-08-08 RX ORDER — ACETAMINOPHEN 500 MG
1000 TABLET ORAL ONCE
Status: COMPLETED | OUTPATIENT
Start: 2025-08-08 | End: 2025-08-08

## 2025-08-08 RX ORDER — METHYLPREDNISOLONE SODIUM SUCCINATE 125 MG/2ML
100 INJECTION INTRAMUSCULAR; INTRAVENOUS ONCE
Status: COMPLETED | OUTPATIENT
Start: 2025-08-08 | End: 2025-08-08

## 2025-08-08 RX ADMIN — BORTEXOMIB 2.5 MG: 3.5 INJECTION, POWDER, LYOPHILIZED, FOR SOLUTION INTRAVENOUS; SUBCUTANEOUS at 13:50

## 2025-08-08 RX ADMIN — METHYLPREDNISOLONE SODIUM SUCCINATE 100 MG: 125 INJECTION, POWDER, FOR SOLUTION INTRAMUSCULAR; INTRAVENOUS at 12:35

## 2025-08-08 RX ADMIN — SODIUM CHLORIDE 20 ML/HR: 9 INJECTION, SOLUTION INTRAVENOUS at 12:34

## 2025-08-08 RX ADMIN — DARATUMUMAB AND HYALURONIDASE-FIHJ (HUMAN RECOMBINANT) 1800 MG: 1800; 30000 INJECTION SUBCUTANEOUS at 13:55

## 2025-08-08 RX ADMIN — ACETAMINOPHEN 1000 MG: 500 TABLET, FILM COATED ORAL at 12:34

## 2025-08-08 RX ADMIN — SODIUM CHLORIDE 50 MG: 9 INJECTION, SOLUTION INTRAVENOUS at 12:40

## 2025-08-11 ENCOUNTER — SPECIALTY PHARMACY (OUTPATIENT)
Dept: ONCOLOGY | Facility: HOSPITAL | Age: 45
End: 2025-08-11
Payer: COMMERCIAL

## 2025-08-11 ENCOUNTER — HOSPITAL ENCOUNTER (OUTPATIENT)
Dept: ONCOLOGY | Facility: HOSPITAL | Age: 45
Discharge: HOME | End: 2025-08-11
Admitting: INTERNAL MEDICINE
Payer: COMMERCIAL

## 2025-08-11 VITALS
RESPIRATION RATE: 16 BRPM | HEART RATE: 76 BPM | DIASTOLIC BLOOD PRESSURE: 73 MMHG | BODY MASS INDEX: 29.89 KG/M2 | WEIGHT: 186 LBS | HEIGHT: 66 IN | TEMPERATURE: 97.3 F | SYSTOLIC BLOOD PRESSURE: 110 MMHG

## 2025-08-11 DIAGNOSIS — N29 MONOCLONAL GAMMOPATHY OF RENAL SIGNIFICANCE (MGRS): Primary | ICD-10-CM

## 2025-08-11 DIAGNOSIS — D47.2 MONOCLONAL GAMMOPATHY OF RENAL SIGNIFICANCE (MGRS): Primary | ICD-10-CM

## 2025-08-11 PROCEDURE — 25010000002 BORTEZOMIB PER 0.1 MG: Performed by: INTERNAL MEDICINE

## 2025-08-11 PROCEDURE — 96401 CHEMO ANTI-NEOPL SQ/IM: CPT

## 2025-08-11 RX ORDER — BORTEZOMIB 3.5 MG/1
1.3 INJECTION, POWDER, LYOPHILIZED, FOR SOLUTION INTRAVENOUS; SUBCUTANEOUS ONCE
Status: COMPLETED | OUTPATIENT
Start: 2025-08-11 | End: 2025-08-11

## 2025-08-11 RX ADMIN — BORTEXOMIB 2.5 MG: 3.5 INJECTION, POWDER, LYOPHILIZED, FOR SOLUTION INTRAVENOUS; SUBCUTANEOUS at 13:29

## 2025-08-12 ENCOUNTER — TELEMEDICINE (OUTPATIENT)
Dept: PSYCHIATRY | Facility: CLINIC | Age: 45
End: 2025-08-12
Payer: COMMERCIAL

## 2025-08-12 DIAGNOSIS — F43.0 STRESS REACTION: Primary | ICD-10-CM

## 2025-08-12 PROCEDURE — 90792 PSYCH DIAG EVAL W/MED SRVCS: CPT | Performed by: NURSE PRACTITIONER

## 2025-08-14 ENCOUNTER — SPECIALTY PHARMACY (OUTPATIENT)
Dept: ONCOLOGY | Facility: HOSPITAL | Age: 45
End: 2025-08-14
Payer: COMMERCIAL

## 2025-08-14 ENCOUNTER — OFFICE VISIT (OUTPATIENT)
Dept: ONCOLOGY | Facility: CLINIC | Age: 45
End: 2025-08-14
Payer: COMMERCIAL

## 2025-08-14 ENCOUNTER — HOSPITAL ENCOUNTER (OUTPATIENT)
Dept: ONCOLOGY | Facility: HOSPITAL | Age: 45
Discharge: HOME | End: 2025-08-14
Admitting: INTERNAL MEDICINE
Payer: COMMERCIAL

## 2025-08-14 VITALS
HEIGHT: 66 IN | TEMPERATURE: 97.3 F | HEART RATE: 72 BPM | WEIGHT: 189 LBS | BODY MASS INDEX: 30.37 KG/M2 | DIASTOLIC BLOOD PRESSURE: 72 MMHG | SYSTOLIC BLOOD PRESSURE: 121 MMHG | OXYGEN SATURATION: 98 %

## 2025-08-14 DIAGNOSIS — N29 MONOCLONAL GAMMOPATHY OF RENAL SIGNIFICANCE (MGRS): ICD-10-CM

## 2025-08-14 DIAGNOSIS — N29 MONOCLONAL GAMMOPATHY OF RENAL SIGNIFICANCE (MGRS): Primary | ICD-10-CM

## 2025-08-14 DIAGNOSIS — D47.2 MONOCLONAL GAMMOPATHY OF RENAL SIGNIFICANCE (MGRS): Primary | ICD-10-CM

## 2025-08-14 DIAGNOSIS — D47.2 MONOCLONAL GAMMOPATHY OF RENAL SIGNIFICANCE (MGRS): ICD-10-CM

## 2025-08-14 LAB
ALBUMIN SERPL-MCNC: 4.3 G/DL (ref 3.5–5.2)
ALBUMIN/GLOB SERPL: 2.7 G/DL
ALP SERPL-CCNC: 74 U/L (ref 39–117)
ALT SERPL W P-5'-P-CCNC: 68 U/L (ref 1–41)
ANION GAP SERPL CALCULATED.3IONS-SCNC: 6 MMOL/L (ref 5–15)
AST SERPL-CCNC: 27 U/L (ref 1–40)
BASOPHILS # BLD AUTO: 0 10*3/MM3 (ref 0–0.2)
BASOPHILS NFR BLD AUTO: 0 % (ref 0–1.5)
BILIRUB SERPL-MCNC: 0.3 MG/DL (ref 0–1.2)
BUN SERPL-MCNC: 11.9 MG/DL (ref 6–20)
BUN/CREAT SERPL: 6.8 (ref 7–25)
CALCIUM SPEC-SCNC: 8.4 MG/DL (ref 8.6–10.5)
CHLORIDE SERPL-SCNC: 109 MMOL/L (ref 98–107)
CO2 SERPL-SCNC: 24 MMOL/L (ref 22–29)
CREAT SERPL-MCNC: 1.74 MG/DL (ref 0.76–1.27)
DEPRECATED RDW RBC AUTO: 54.6 FL (ref 37–54)
EGFRCR SERPLBLD CKD-EPI 2021: 49 ML/MIN/1.73
EOSINOPHIL # BLD AUTO: 0.15 10*3/MM3 (ref 0–0.4)
EOSINOPHIL NFR BLD AUTO: 5.2 % (ref 0.3–6.2)
ERYTHROCYTE [DISTWIDTH] IN BLOOD BY AUTOMATED COUNT: 14.4 % (ref 12.3–15.4)
GLOBULIN UR ELPH-MCNC: 1.6 GM/DL
GLUCOSE SERPL-MCNC: 94 MG/DL (ref 65–99)
HCT VFR BLD AUTO: 32.6 % (ref 37.5–51)
HGB BLD-MCNC: 11.4 G/DL (ref 13–17.7)
IMM GRANULOCYTES # BLD AUTO: 0.02 10*3/MM3 (ref 0–0.05)
IMM GRANULOCYTES NFR BLD AUTO: 0.7 % (ref 0–0.5)
LYMPHOCYTES # BLD AUTO: 0.82 10*3/MM3 (ref 0.7–3.1)
LYMPHOCYTES NFR BLD AUTO: 28.6 % (ref 19.6–45.3)
MCH RBC QN AUTO: 35.7 PG (ref 26.6–33)
MCHC RBC AUTO-ENTMCNC: 35 G/DL (ref 31.5–35.7)
MCV RBC AUTO: 102.2 FL (ref 79–97)
MONOCYTES # BLD AUTO: 0.48 10*3/MM3 (ref 0.1–0.9)
MONOCYTES NFR BLD AUTO: 16.7 % (ref 5–12)
NEUTROPHILS NFR BLD AUTO: 1.4 10*3/MM3 (ref 1.7–7)
NEUTROPHILS NFR BLD AUTO: 48.8 % (ref 42.7–76)
PLATELET # BLD AUTO: 119 10*3/MM3 (ref 140–450)
PMV BLD AUTO: 11.2 FL (ref 6–12)
POTASSIUM SERPL-SCNC: 3.9 MMOL/L (ref 3.5–5.2)
PROT SERPL-MCNC: 5.9 G/DL (ref 6–8.5)
RBC # BLD AUTO: 3.19 10*6/MM3 (ref 4.14–5.8)
SODIUM SERPL-SCNC: 139 MMOL/L (ref 136–145)
WBC NRBC COR # BLD AUTO: 2.87 10*3/MM3 (ref 3.4–10.8)

## 2025-08-14 PROCEDURE — 36415 COLL VENOUS BLD VENIPUNCTURE: CPT

## 2025-08-14 PROCEDURE — 80053 COMPREHEN METABOLIC PANEL: CPT | Performed by: INTERNAL MEDICINE

## 2025-08-14 PROCEDURE — 99214 OFFICE O/P EST MOD 30 MIN: CPT | Performed by: INTERNAL MEDICINE

## 2025-08-14 PROCEDURE — 85025 COMPLETE CBC W/AUTO DIFF WBC: CPT | Performed by: INTERNAL MEDICINE

## 2025-08-14 RX ORDER — BORTEZOMIB 3.5 MG/1
1.3 INJECTION, POWDER, LYOPHILIZED, FOR SOLUTION INTRAVENOUS; SUBCUTANEOUS ONCE
OUTPATIENT
Start: 2025-08-22

## 2025-08-14 RX ORDER — DIPHENHYDRAMINE HYDROCHLORIDE 50 MG/ML
50 INJECTION, SOLUTION INTRAMUSCULAR; INTRAVENOUS AS NEEDED
OUTPATIENT
Start: 2025-08-22

## 2025-08-14 RX ORDER — METHYLPREDNISOLONE SODIUM SUCCINATE 125 MG/2ML
60 INJECTION INTRAMUSCULAR; INTRAVENOUS ONCE
OUTPATIENT
Start: 2025-08-22

## 2025-08-14 RX ORDER — METHYLPREDNISOLONE SODIUM SUCCINATE 125 MG/2ML
60 INJECTION INTRAMUSCULAR; INTRAVENOUS ONCE
OUTPATIENT
Start: 2025-09-05

## 2025-08-14 RX ORDER — FAMOTIDINE 10 MG/ML
20 INJECTION, SOLUTION INTRAVENOUS AS NEEDED
OUTPATIENT
Start: 2025-08-22

## 2025-08-14 RX ORDER — ACETAMINOPHEN 500 MG
1000 TABLET ORAL ONCE
OUTPATIENT
Start: 2025-08-29

## 2025-08-14 RX ORDER — FAMOTIDINE 10 MG/ML
20 INJECTION, SOLUTION INTRAVENOUS AS NEEDED
OUTPATIENT
Start: 2025-09-05

## 2025-08-14 RX ORDER — HYDROCORTISONE SODIUM SUCCINATE 100 MG/2ML
100 INJECTION INTRAMUSCULAR; INTRAVENOUS AS NEEDED
OUTPATIENT
Start: 2025-08-29

## 2025-08-14 RX ORDER — DIPHENHYDRAMINE HYDROCHLORIDE 50 MG/ML
50 INJECTION, SOLUTION INTRAMUSCULAR; INTRAVENOUS AS NEEDED
OUTPATIENT
Start: 2025-08-29

## 2025-08-14 RX ORDER — LENALIDOMIDE 10 MG/1
10 CAPSULE ORAL DAILY
Qty: 14 CAPSULE | Refills: 0 | Status: SHIPPED | OUTPATIENT
Start: 2025-08-14

## 2025-08-14 RX ORDER — BORTEZOMIB 3.5 MG/1
1.3 INJECTION, POWDER, LYOPHILIZED, FOR SOLUTION INTRAVENOUS; SUBCUTANEOUS ONCE
OUTPATIENT
Start: 2025-08-25

## 2025-08-14 RX ORDER — ACETAMINOPHEN 500 MG
1000 TABLET ORAL ONCE
OUTPATIENT
Start: 2025-09-05

## 2025-08-14 RX ORDER — METHYLPREDNISOLONE SODIUM SUCCINATE 125 MG/2ML
60 INJECTION INTRAMUSCULAR; INTRAVENOUS ONCE
OUTPATIENT
Start: 2025-08-29

## 2025-08-14 RX ORDER — DIPHENHYDRAMINE HYDROCHLORIDE 50 MG/ML
50 INJECTION, SOLUTION INTRAMUSCULAR; INTRAVENOUS AS NEEDED
OUTPATIENT
Start: 2025-09-05

## 2025-08-14 RX ORDER — SODIUM CHLORIDE 9 MG/ML
20 INJECTION, SOLUTION INTRAVENOUS ONCE
OUTPATIENT
Start: 2025-08-22

## 2025-08-14 RX ORDER — FAMOTIDINE 10 MG/ML
20 INJECTION, SOLUTION INTRAVENOUS AS NEEDED
OUTPATIENT
Start: 2025-08-29

## 2025-08-14 RX ORDER — BORTEZOMIB 3.5 MG/1
1.3 INJECTION, POWDER, LYOPHILIZED, FOR SOLUTION INTRAVENOUS; SUBCUTANEOUS ONCE
OUTPATIENT
Start: 2025-08-29

## 2025-08-14 RX ORDER — HYDROCORTISONE SODIUM SUCCINATE 100 MG/2ML
100 INJECTION INTRAMUSCULAR; INTRAVENOUS AS NEEDED
OUTPATIENT
Start: 2025-08-22

## 2025-08-14 RX ORDER — ACETAMINOPHEN 500 MG
1000 TABLET ORAL ONCE
OUTPATIENT
Start: 2025-08-22

## 2025-08-14 RX ORDER — SODIUM CHLORIDE 9 MG/ML
20 INJECTION, SOLUTION INTRAVENOUS ONCE
OUTPATIENT
Start: 2025-09-05

## 2025-08-14 RX ORDER — BORTEZOMIB 3.5 MG/1
1.3 INJECTION, POWDER, LYOPHILIZED, FOR SOLUTION INTRAVENOUS; SUBCUTANEOUS ONCE
OUTPATIENT
Start: 2025-09-01

## 2025-08-14 RX ORDER — SODIUM CHLORIDE 9 MG/ML
20 INJECTION, SOLUTION INTRAVENOUS ONCE
OUTPATIENT
Start: 2025-08-29

## 2025-08-14 RX ORDER — HYDROCORTISONE SODIUM SUCCINATE 100 MG/2ML
100 INJECTION INTRAMUSCULAR; INTRAVENOUS AS NEEDED
OUTPATIENT
Start: 2025-09-05

## 2025-08-15 ENCOUNTER — HOSPITAL ENCOUNTER (OUTPATIENT)
Dept: ONCOLOGY | Facility: HOSPITAL | Age: 45
Discharge: HOME | End: 2025-08-15
Payer: COMMERCIAL

## 2025-08-15 VITALS
HEIGHT: 66 IN | SYSTOLIC BLOOD PRESSURE: 108 MMHG | RESPIRATION RATE: 16 BRPM | TEMPERATURE: 98.1 F | WEIGHT: 185 LBS | DIASTOLIC BLOOD PRESSURE: 68 MMHG | HEART RATE: 78 BPM | BODY MASS INDEX: 29.73 KG/M2

## 2025-08-15 DIAGNOSIS — N29 MONOCLONAL GAMMOPATHY OF RENAL SIGNIFICANCE (MGRS): Primary | ICD-10-CM

## 2025-08-15 DIAGNOSIS — D47.2 MONOCLONAL GAMMOPATHY OF RENAL SIGNIFICANCE (MGRS): Primary | ICD-10-CM

## 2025-08-15 PROCEDURE — 25010000002 METHYLPREDNISOLONE PER 125 MG: Performed by: INTERNAL MEDICINE

## 2025-08-15 PROCEDURE — 96374 THER/PROPH/DIAG INJ IV PUSH: CPT

## 2025-08-15 PROCEDURE — 96401 CHEMO ANTI-NEOPL SQ/IM: CPT

## 2025-08-15 PROCEDURE — 96375 TX/PRO/DX INJ NEW DRUG ADDON: CPT

## 2025-08-15 PROCEDURE — 25010000002 DIPHENHYDRAMINE PER 50 MG: Performed by: INTERNAL MEDICINE

## 2025-08-15 PROCEDURE — 25010000002 DARATUMUMAB-HYALURONIDASE-FIHJ 1800-30000 MG-UT/15ML SOLUTION: Performed by: INTERNAL MEDICINE

## 2025-08-15 RX ORDER — FAMOTIDINE 10 MG/ML
20 INJECTION, SOLUTION INTRAVENOUS AS NEEDED
Status: DISCONTINUED | OUTPATIENT
Start: 2025-08-15 | End: 2025-08-16 | Stop reason: HOSPADM

## 2025-08-15 RX ORDER — METHYLPREDNISOLONE SODIUM SUCCINATE 125 MG/2ML
60 INJECTION INTRAMUSCULAR; INTRAVENOUS ONCE
Status: COMPLETED | OUTPATIENT
Start: 2025-08-15 | End: 2025-08-15

## 2025-08-15 RX ORDER — ACETAMINOPHEN 500 MG
1000 TABLET ORAL ONCE
Status: COMPLETED | OUTPATIENT
Start: 2025-08-15 | End: 2025-08-15

## 2025-08-15 RX ORDER — HYDROCORTISONE SODIUM SUCCINATE 100 MG/2ML
100 INJECTION INTRAMUSCULAR; INTRAVENOUS AS NEEDED
Status: DISCONTINUED | OUTPATIENT
Start: 2025-08-15 | End: 2025-08-16 | Stop reason: HOSPADM

## 2025-08-15 RX ORDER — DIPHENHYDRAMINE HYDROCHLORIDE 50 MG/ML
50 INJECTION, SOLUTION INTRAMUSCULAR; INTRAVENOUS AS NEEDED
Status: DISCONTINUED | OUTPATIENT
Start: 2025-08-15 | End: 2025-08-16 | Stop reason: HOSPADM

## 2025-08-15 RX ORDER — SODIUM CHLORIDE 9 MG/ML
20 INJECTION, SOLUTION INTRAVENOUS ONCE
Status: DISCONTINUED | OUTPATIENT
Start: 2025-08-15 | End: 2025-08-16 | Stop reason: HOSPADM

## 2025-08-15 RX ADMIN — DARATUMUMAB AND HYALURONIDASE-FIHJ (HUMAN RECOMBINANT) 1800 MG: 1800; 30000 INJECTION SUBCUTANEOUS at 13:29

## 2025-08-15 RX ADMIN — ACETAMINOPHEN 1000 MG: 500 TABLET ORAL at 12:00

## 2025-08-15 RX ADMIN — METHYLPREDNISOLONE SODIUM SUCCINATE 60 MG: 125 INJECTION, POWDER, FOR SOLUTION INTRAMUSCULAR; INTRAVENOUS at 12:00

## 2025-08-15 RX ADMIN — DIPHENHYDRAMINE HYDROCHLORIDE 25 MG: 50 INJECTION INTRAMUSCULAR; INTRAVENOUS at 12:04

## 2025-08-21 ENCOUNTER — HOSPITAL ENCOUNTER (OUTPATIENT)
Facility: HOSPITAL | Age: 45
Discharge: HOME | End: 2025-08-21
Admitting: INTERNAL MEDICINE
Payer: COMMERCIAL

## 2025-08-21 ENCOUNTER — TELEPHONE (OUTPATIENT)
Age: 45
End: 2025-08-21
Payer: COMMERCIAL

## 2025-08-21 VITALS
TEMPERATURE: 98.1 F | HEIGHT: 66 IN | HEART RATE: 60 BPM | BODY MASS INDEX: 30.6 KG/M2 | RESPIRATION RATE: 18 BRPM | DIASTOLIC BLOOD PRESSURE: 69 MMHG | SYSTOLIC BLOOD PRESSURE: 111 MMHG | WEIGHT: 190.4 LBS

## 2025-08-21 DIAGNOSIS — D47.2 MONOCLONAL GAMMOPATHY OF RENAL SIGNIFICANCE (MGRS): ICD-10-CM

## 2025-08-21 DIAGNOSIS — N29 MONOCLONAL GAMMOPATHY OF RENAL SIGNIFICANCE (MGRS): ICD-10-CM

## 2025-08-21 LAB
ALBUMIN SERPL-MCNC: 4.5 G/DL (ref 3.5–5.2)
ALBUMIN/GLOB SERPL: 2.4 G/DL
ALP SERPL-CCNC: 96 U/L (ref 39–117)
ALT SERPL W P-5'-P-CCNC: 41 U/L (ref 1–41)
ANION GAP SERPL CALCULATED.3IONS-SCNC: 10.4 MMOL/L (ref 5–15)
AST SERPL-CCNC: 22 U/L (ref 1–40)
BASOPHILS # BLD AUTO: 0.02 10*3/MM3 (ref 0–0.2)
BASOPHILS NFR BLD AUTO: 0.6 % (ref 0–1.5)
BILIRUB SERPL-MCNC: 0.3 MG/DL (ref 0–1.2)
BUN SERPL-MCNC: 12.1 MG/DL (ref 6–20)
BUN/CREAT SERPL: 7 (ref 7–25)
CALCIUM SPEC-SCNC: 9.1 MG/DL (ref 8.6–10.5)
CHLORIDE SERPL-SCNC: 105 MMOL/L (ref 98–107)
CO2 SERPL-SCNC: 22.6 MMOL/L (ref 22–29)
CREAT SERPL-MCNC: 1.72 MG/DL (ref 0.76–1.27)
DEPRECATED RDW RBC AUTO: 53.4 FL (ref 37–54)
EGFRCR SERPLBLD CKD-EPI 2021: 49.6 ML/MIN/1.73
EOSINOPHIL # BLD AUTO: 0.08 10*3/MM3 (ref 0–0.4)
EOSINOPHIL NFR BLD AUTO: 2.5 % (ref 0.3–6.2)
ERYTHROCYTE [DISTWIDTH] IN BLOOD BY AUTOMATED COUNT: 14.1 % (ref 12.3–15.4)
GLOBULIN UR ELPH-MCNC: 1.9 GM/DL
GLUCOSE SERPL-MCNC: 85 MG/DL (ref 65–99)
HCT VFR BLD AUTO: 32.9 % (ref 37.5–51)
HGB BLD-MCNC: 11.5 G/DL (ref 13–17.7)
IMM GRANULOCYTES # BLD AUTO: 0 10*3/MM3 (ref 0–0.05)
IMM GRANULOCYTES NFR BLD AUTO: 0 % (ref 0–0.5)
LYMPHOCYTES # BLD AUTO: 1.4 10*3/MM3 (ref 0.7–3.1)
LYMPHOCYTES NFR BLD AUTO: 42.9 % (ref 19.6–45.3)
MCH RBC QN AUTO: 35.6 PG (ref 26.6–33)
MCHC RBC AUTO-ENTMCNC: 35 G/DL (ref 31.5–35.7)
MCV RBC AUTO: 101.9 FL (ref 79–97)
MONOCYTES # BLD AUTO: 0.59 10*3/MM3 (ref 0.1–0.9)
MONOCYTES NFR BLD AUTO: 18.1 % (ref 5–12)
NEUTROPHILS NFR BLD AUTO: 1.17 10*3/MM3 (ref 1.7–7)
NEUTROPHILS NFR BLD AUTO: 35.9 % (ref 42.7–76)
PLATELET # BLD AUTO: 198 10*3/MM3 (ref 140–450)
PMV BLD AUTO: 9.3 FL (ref 6–12)
POTASSIUM SERPL-SCNC: 3.7 MMOL/L (ref 3.5–5.2)
PROT SERPL-MCNC: 6.4 G/DL (ref 6–8.5)
RBC # BLD AUTO: 3.23 10*6/MM3 (ref 4.14–5.8)
SODIUM SERPL-SCNC: 138 MMOL/L (ref 136–145)
WBC NRBC COR # BLD AUTO: 3.26 10*3/MM3 (ref 3.4–10.8)

## 2025-08-21 PROCEDURE — 36415 COLL VENOUS BLD VENIPUNCTURE: CPT

## 2025-08-21 PROCEDURE — 82784 ASSAY IGA/IGD/IGG/IGM EACH: CPT | Performed by: INTERNAL MEDICINE

## 2025-08-21 PROCEDURE — 80053 COMPREHEN METABOLIC PANEL: CPT | Performed by: INTERNAL MEDICINE

## 2025-08-21 PROCEDURE — 85025 COMPLETE CBC W/AUTO DIFF WBC: CPT | Performed by: INTERNAL MEDICINE

## 2025-08-21 PROCEDURE — 83521 IG LIGHT CHAINS FREE EACH: CPT | Performed by: INTERNAL MEDICINE

## 2025-08-21 PROCEDURE — 86334 IMMUNOFIX E-PHORESIS SERUM: CPT | Performed by: INTERNAL MEDICINE

## 2025-08-22 ENCOUNTER — OFFICE VISIT (OUTPATIENT)
Dept: ONCOLOGY | Facility: CLINIC | Age: 45
End: 2025-08-22
Payer: COMMERCIAL

## 2025-08-22 ENCOUNTER — SPECIALTY PHARMACY (OUTPATIENT)
Dept: ONCOLOGY | Facility: HOSPITAL | Age: 45
End: 2025-08-22
Payer: COMMERCIAL

## 2025-08-22 ENCOUNTER — HOSPITAL ENCOUNTER (OUTPATIENT)
Facility: HOSPITAL | Age: 45
Discharge: HOME | End: 2025-08-22
Payer: COMMERCIAL

## 2025-08-22 VITALS
SYSTOLIC BLOOD PRESSURE: 107 MMHG | HEIGHT: 66 IN | OXYGEN SATURATION: 99 % | DIASTOLIC BLOOD PRESSURE: 66 MMHG | WEIGHT: 187 LBS | BODY MASS INDEX: 30.05 KG/M2 | RESPIRATION RATE: 12 BRPM | HEART RATE: 80 BPM | TEMPERATURE: 97.1 F

## 2025-08-22 DIAGNOSIS — C90.00 MULTIPLE MYELOMA NOT HAVING ACHIEVED REMISSION: Primary | ICD-10-CM

## 2025-08-22 DIAGNOSIS — D47.2 MONOCLONAL GAMMOPATHY OF RENAL SIGNIFICANCE (MGRS): Primary | ICD-10-CM

## 2025-08-22 DIAGNOSIS — N29 MONOCLONAL GAMMOPATHY OF RENAL SIGNIFICANCE (MGRS): Primary | ICD-10-CM

## 2025-08-22 DIAGNOSIS — Z51.11 ENCOUNTER FOR CHEMOTHERAPY MANAGEMENT: ICD-10-CM

## 2025-08-22 PROCEDURE — 96401 CHEMO ANTI-NEOPL SQ/IM: CPT

## 2025-08-22 PROCEDURE — 25010000002 BORTEZOMIB PER 0.1 MG: Performed by: INTERNAL MEDICINE

## 2025-08-22 PROCEDURE — 25810000003 SODIUM CHLORIDE 0.9 % SOLUTION: Performed by: INTERNAL MEDICINE

## 2025-08-22 PROCEDURE — 96375 TX/PRO/DX INJ NEW DRUG ADDON: CPT

## 2025-08-22 PROCEDURE — 25010000002 DARATUMUMAB-HYALURONIDASE-FIHJ 1800-30000 MG-UT/15ML SOLUTION: Performed by: INTERNAL MEDICINE

## 2025-08-22 PROCEDURE — 96374 THER/PROPH/DIAG INJ IV PUSH: CPT

## 2025-08-22 PROCEDURE — 25010000002 DIPHENHYDRAMINE PER 50 MG: Performed by: INTERNAL MEDICINE

## 2025-08-22 PROCEDURE — 25010000002 METHYLPREDNISOLONE PER 125 MG: Performed by: INTERNAL MEDICINE

## 2025-08-22 RX ORDER — DIPHENHYDRAMINE HYDROCHLORIDE 50 MG/ML
50 INJECTION, SOLUTION INTRAMUSCULAR; INTRAVENOUS AS NEEDED
Status: DISCONTINUED | OUTPATIENT
Start: 2025-08-22 | End: 2025-08-23 | Stop reason: HOSPADM

## 2025-08-22 RX ORDER — BORTEZOMIB 3.5 MG/1
1.3 INJECTION, POWDER, LYOPHILIZED, FOR SOLUTION INTRAVENOUS; SUBCUTANEOUS ONCE
Status: COMPLETED | OUTPATIENT
Start: 2025-08-22 | End: 2025-08-22

## 2025-08-22 RX ORDER — ASPIRIN 81 MG/1
81 TABLET ORAL DAILY
COMMUNITY

## 2025-08-22 RX ORDER — METHYLPREDNISOLONE SODIUM SUCCINATE 125 MG/2ML
60 INJECTION INTRAMUSCULAR; INTRAVENOUS ONCE
Status: COMPLETED | OUTPATIENT
Start: 2025-08-22 | End: 2025-08-22

## 2025-08-22 RX ORDER — FAMOTIDINE 10 MG/ML
20 INJECTION, SOLUTION INTRAVENOUS AS NEEDED
Status: DISCONTINUED | OUTPATIENT
Start: 2025-08-22 | End: 2025-08-23 | Stop reason: HOSPADM

## 2025-08-22 RX ORDER — HYDROCORTISONE SODIUM SUCCINATE 100 MG/2ML
100 INJECTION INTRAMUSCULAR; INTRAVENOUS AS NEEDED
Status: DISCONTINUED | OUTPATIENT
Start: 2025-08-22 | End: 2025-08-23 | Stop reason: HOSPADM

## 2025-08-22 RX ORDER — SODIUM CHLORIDE 9 MG/ML
20 INJECTION, SOLUTION INTRAVENOUS ONCE
Status: COMPLETED | OUTPATIENT
Start: 2025-08-22 | End: 2025-08-22

## 2025-08-22 RX ORDER — ACETAMINOPHEN 500 MG
1000 TABLET ORAL ONCE
Status: COMPLETED | OUTPATIENT
Start: 2025-08-22 | End: 2025-08-22

## 2025-08-22 RX ADMIN — METHYLPREDNISOLONE SODIUM SUCCINATE 60 MG: 125 INJECTION INTRAMUSCULAR; INTRAVENOUS at 09:04

## 2025-08-22 RX ADMIN — BORTEXOMIB 2.5 MG: 3.5 INJECTION, POWDER, LYOPHILIZED, FOR SOLUTION INTRAVENOUS; SUBCUTANEOUS at 10:02

## 2025-08-22 RX ADMIN — ACETAMINOPHEN 1000 MG: 500 TABLET ORAL at 08:58

## 2025-08-22 RX ADMIN — SODIUM CHLORIDE 25 MG: 9 INJECTION, SOLUTION INTRAVENOUS at 09:00

## 2025-08-22 RX ADMIN — SODIUM CHLORIDE 20 ML/HR: 9 INJECTION, SOLUTION INTRAVENOUS at 08:57

## 2025-08-22 RX ADMIN — DARATUMUMAB AND HYALURONIDASE-FIHJ (HUMAN RECOMBINANT) 1800 MG: 1800; 30000 INJECTION SUBCUTANEOUS at 10:15

## 2025-08-25 ENCOUNTER — LAB (OUTPATIENT)
Facility: HOSPITAL | Age: 45
End: 2025-08-25
Payer: COMMERCIAL

## 2025-08-25 ENCOUNTER — HOSPITAL ENCOUNTER (OUTPATIENT)
Facility: HOSPITAL | Age: 45
Discharge: HOME | End: 2025-08-25
Admitting: INTERNAL MEDICINE
Payer: COMMERCIAL

## 2025-08-25 ENCOUNTER — PATIENT MESSAGE (OUTPATIENT)
Dept: ONCOLOGY | Facility: CLINIC | Age: 45
End: 2025-08-25
Payer: COMMERCIAL

## 2025-08-25 VITALS
BODY MASS INDEX: 29.86 KG/M2 | HEIGHT: 66 IN | HEART RATE: 62 BPM | TEMPERATURE: 97.9 F | RESPIRATION RATE: 18 BRPM | DIASTOLIC BLOOD PRESSURE: 57 MMHG | WEIGHT: 185.8 LBS | SYSTOLIC BLOOD PRESSURE: 106 MMHG

## 2025-08-25 DIAGNOSIS — C90.00 MULTIPLE MYELOMA NOT HAVING ACHIEVED REMISSION: ICD-10-CM

## 2025-08-25 DIAGNOSIS — D47.2 MONOCLONAL GAMMOPATHY OF RENAL SIGNIFICANCE (MGRS): Primary | ICD-10-CM

## 2025-08-25 DIAGNOSIS — N29 MONOCLONAL GAMMOPATHY OF RENAL SIGNIFICANCE (MGRS): Primary | ICD-10-CM

## 2025-08-25 DIAGNOSIS — C90.00 MULTIPLE MYELOMA NOT HAVING ACHIEVED REMISSION: Primary | ICD-10-CM

## 2025-08-25 LAB
KAPPA LC FREE SER-MCNC: 1740.3 MG/L (ref 3.3–19.4)
KAPPA LC FREE/LAMBDA FREE SER: 580.1 {RATIO} (ref 0.26–1.65)
LAMBDA LC FREE SERPL-MCNC: 3 MG/L (ref 5.7–26.3)

## 2025-08-25 PROCEDURE — 96401 CHEMO ANTI-NEOPL SQ/IM: CPT

## 2025-08-25 PROCEDURE — 84166 PROTEIN E-PHORESIS/URINE/CSF: CPT

## 2025-08-25 PROCEDURE — 81050 URINALYSIS VOLUME MEASURE: CPT | Performed by: NURSE PRACTITIONER

## 2025-08-25 PROCEDURE — 83521 IG LIGHT CHAINS FREE EACH: CPT | Performed by: NURSE PRACTITIONER

## 2025-08-25 PROCEDURE — 25010000002 BORTEZOMIB PER 0.1 MG: Performed by: INTERNAL MEDICINE

## 2025-08-25 PROCEDURE — 84156 ASSAY OF PROTEIN URINE: CPT

## 2025-08-25 RX ORDER — BORTEZOMIB 3.5 MG/1
1.3 INJECTION, POWDER, LYOPHILIZED, FOR SOLUTION INTRAVENOUS; SUBCUTANEOUS ONCE
Status: COMPLETED | OUTPATIENT
Start: 2025-08-25 | End: 2025-08-25

## 2025-08-25 RX ADMIN — BORTEXOMIB 2.5 MG: 3.5 INJECTION, POWDER, LYOPHILIZED, FOR SOLUTION INTRAVENOUS; SUBCUTANEOUS at 13:46

## 2025-08-27 ENCOUNTER — HOSPITAL ENCOUNTER (OUTPATIENT)
Facility: HOSPITAL | Age: 45
Discharge: HOME | End: 2025-08-27
Admitting: INTERNAL MEDICINE
Payer: COMMERCIAL

## 2025-08-27 ENCOUNTER — OFFICE VISIT (OUTPATIENT)
Dept: ONCOLOGY | Facility: CLINIC | Age: 45
End: 2025-08-27
Payer: COMMERCIAL

## 2025-08-27 VITALS
HEIGHT: 66 IN | WEIGHT: 191 LBS | TEMPERATURE: 97.5 F | DIASTOLIC BLOOD PRESSURE: 64 MMHG | OXYGEN SATURATION: 97 % | HEART RATE: 71 BPM | BODY MASS INDEX: 30.7 KG/M2 | RESPIRATION RATE: 12 BRPM | SYSTOLIC BLOOD PRESSURE: 112 MMHG

## 2025-08-27 DIAGNOSIS — D47.2 MONOCLONAL GAMMOPATHY OF RENAL SIGNIFICANCE (MGRS): ICD-10-CM

## 2025-08-27 DIAGNOSIS — Z51.11 ENCOUNTER FOR CHEMOTHERAPY MANAGEMENT: ICD-10-CM

## 2025-08-27 DIAGNOSIS — N29 MONOCLONAL GAMMOPATHY OF RENAL SIGNIFICANCE (MGRS): ICD-10-CM

## 2025-08-27 DIAGNOSIS — C90.00 MULTIPLE MYELOMA NOT HAVING ACHIEVED REMISSION: Primary | ICD-10-CM

## 2025-08-27 LAB
ALBUMIN SERPL-MCNC: 4.4 G/DL (ref 3.5–5.2)
ALBUMIN/GLOB SERPL: 2.1 G/DL
ALP SERPL-CCNC: 116 U/L (ref 39–117)
ALT SERPL W P-5'-P-CCNC: 41 U/L (ref 1–41)
ANION GAP SERPL CALCULATED.3IONS-SCNC: 10.6 MMOL/L (ref 5–15)
AST SERPL-CCNC: 27 U/L (ref 1–40)
BASOPHILS # BLD AUTO: 0.02 10*3/MM3 (ref 0–0.2)
BASOPHILS NFR BLD AUTO: 0.6 % (ref 0–1.5)
BILIRUB SERPL-MCNC: 0.3 MG/DL (ref 0–1.2)
BUN SERPL-MCNC: 12.1 MG/DL (ref 6–20)
BUN/CREAT SERPL: 8.1 (ref 7–25)
CALCIUM SPEC-SCNC: 9.4 MG/DL (ref 8.6–10.5)
CHLORIDE SERPL-SCNC: 102 MMOL/L (ref 98–107)
CO2 SERPL-SCNC: 24.4 MMOL/L (ref 22–29)
CREAT SERPL-MCNC: 1.5 MG/DL (ref 0.76–1.27)
DEPRECATED RDW RBC AUTO: 51.1 FL (ref 37–54)
EGFRCR SERPLBLD CKD-EPI 2021: 58.5 ML/MIN/1.73
EOSINOPHIL # BLD AUTO: 0.13 10*3/MM3 (ref 0–0.4)
EOSINOPHIL NFR BLD AUTO: 3.7 % (ref 0.3–6.2)
ERYTHROCYTE [DISTWIDTH] IN BLOOD BY AUTOMATED COUNT: 13.4 % (ref 12.3–15.4)
GLOBULIN UR ELPH-MCNC: 2.1 GM/DL
GLUCOSE SERPL-MCNC: 87 MG/DL (ref 65–99)
HCT VFR BLD AUTO: 34.7 % (ref 37.5–51)
HGB BLD-MCNC: 12 G/DL (ref 13–17.7)
IGA SERPL-MCNC: 19 MG/DL (ref 90–386)
IGG SERPL-MCNC: 496 MG/DL (ref 603–1613)
IGM SERPL-MCNC: 6 MG/DL (ref 20–172)
IMM GRANULOCYTES # BLD AUTO: 0.02 10*3/MM3 (ref 0–0.05)
IMM GRANULOCYTES NFR BLD AUTO: 0.6 % (ref 0–0.5)
LYMPHOCYTES # BLD AUTO: 0.89 10*3/MM3 (ref 0.7–3.1)
LYMPHOCYTES NFR BLD AUTO: 25.3 % (ref 19.6–45.3)
MCH RBC QN AUTO: 35.3 PG (ref 26.6–33)
MCHC RBC AUTO-ENTMCNC: 34.6 G/DL (ref 31.5–35.7)
MCV RBC AUTO: 102.1 FL (ref 79–97)
MONOCYTES # BLD AUTO: 0.17 10*3/MM3 (ref 0.1–0.9)
MONOCYTES NFR BLD AUTO: 4.8 % (ref 5–12)
NEUTROPHILS NFR BLD AUTO: 2.29 10*3/MM3 (ref 1.7–7)
NEUTROPHILS NFR BLD AUTO: 65 % (ref 42.7–76)
PLATELET # BLD AUTO: 185 10*3/MM3 (ref 140–450)
PMV BLD AUTO: 10.3 FL (ref 6–12)
POTASSIUM SERPL-SCNC: 3.7 MMOL/L (ref 3.5–5.2)
PROT PATTERN SERPL IFE-IMP: ABNORMAL
PROT SERPL-MCNC: 6.5 G/DL (ref 6–8.5)
RBC # BLD AUTO: 3.4 10*6/MM3 (ref 4.14–5.8)
SODIUM SERPL-SCNC: 137 MMOL/L (ref 136–145)
WBC NRBC COR # BLD AUTO: 3.52 10*3/MM3 (ref 3.4–10.8)

## 2025-08-27 PROCEDURE — 85025 COMPLETE CBC W/AUTO DIFF WBC: CPT | Performed by: INTERNAL MEDICINE

## 2025-08-27 PROCEDURE — 36415 COLL VENOUS BLD VENIPUNCTURE: CPT

## 2025-08-27 PROCEDURE — 80053 COMPREHEN METABOLIC PANEL: CPT | Performed by: INTERNAL MEDICINE

## 2025-08-27 PROCEDURE — G0463 HOSPITAL OUTPT CLINIC VISIT: HCPCS

## 2025-08-28 ENCOUNTER — SPECIALTY PHARMACY (OUTPATIENT)
Dept: ONCOLOGY | Facility: HOSPITAL | Age: 45
End: 2025-08-28
Payer: COMMERCIAL

## 2025-08-28 LAB
ALBUMIN 24H MFR UR ELPH: 17.5 %
ALPHA1 GLOB 24H MFR UR ELPH: 7.5 %
ALPHA2 GLOB 24H MFR UR ELPH: 17.3 %
B-GLOBULIN 24H MFR UR ELPH: 49.6 %
GAMMA GLOB 24H MFR UR ELPH: 8.1 %
LABORATORY COMMENT REPORT: ABNORMAL
M PROTEIN 24H MFR UR ELPH: ABNORMAL %
PROT 24H UR-MRATE: 1002 MG/24 HR (ref 30–150)
PROT UR-MCNC: 40.9 MG/DL

## 2025-08-29 ENCOUNTER — HOSPITAL ENCOUNTER (OUTPATIENT)
Facility: HOSPITAL | Age: 45
Discharge: HOME | End: 2025-08-29
Payer: COMMERCIAL

## 2025-08-29 VITALS
TEMPERATURE: 97.9 F | RESPIRATION RATE: 18 BRPM | SYSTOLIC BLOOD PRESSURE: 129 MMHG | DIASTOLIC BLOOD PRESSURE: 77 MMHG | HEART RATE: 108 BPM | WEIGHT: 186.6 LBS | BODY MASS INDEX: 29.99 KG/M2 | HEIGHT: 66 IN

## 2025-08-29 DIAGNOSIS — N29 MONOCLONAL GAMMOPATHY OF RENAL SIGNIFICANCE (MGRS): Primary | ICD-10-CM

## 2025-08-29 DIAGNOSIS — D47.2 MONOCLONAL GAMMOPATHY OF RENAL SIGNIFICANCE (MGRS): Primary | ICD-10-CM

## 2025-08-29 LAB
KAPPA LC FREE 24H UR-MRATE: 7161.77 MG/24 HR
KAPPA LC FREE UR-MCNC: 2923.17 MG/L (ref 1.17–86.46)
KAPPA LC FREE/LAMBDA FREE UR: 446.97 (ref 1.83–14.26)
LAMBDA LC FREE 24H UR-MRATE: 16.02 MG/24 HR
LAMBDA LC FREE UR-MCNC: 6.54 MG/L (ref 0.27–15.21)

## 2025-08-29 PROCEDURE — 96374 THER/PROPH/DIAG INJ IV PUSH: CPT

## 2025-08-29 PROCEDURE — 25810000003 SODIUM CHLORIDE 0.9 % SOLUTION: Performed by: INTERNAL MEDICINE

## 2025-08-29 PROCEDURE — 25010000002 METHYLPREDNISOLONE PER 125 MG: Performed by: INTERNAL MEDICINE

## 2025-08-29 PROCEDURE — 96375 TX/PRO/DX INJ NEW DRUG ADDON: CPT

## 2025-08-29 PROCEDURE — 96401 CHEMO ANTI-NEOPL SQ/IM: CPT

## 2025-08-29 PROCEDURE — 25010000002 BORTEZOMIB PER 0.1 MG: Performed by: INTERNAL MEDICINE

## 2025-08-29 PROCEDURE — 25010000002 DIPHENHYDRAMINE PER 50 MG: Performed by: INTERNAL MEDICINE

## 2025-08-29 RX ORDER — BORTEZOMIB 3.5 MG/1
1.3 INJECTION, POWDER, LYOPHILIZED, FOR SOLUTION INTRAVENOUS; SUBCUTANEOUS ONCE
Status: COMPLETED | OUTPATIENT
Start: 2025-08-29 | End: 2025-08-29

## 2025-08-29 RX ORDER — WATER 10 ML/10ML
INJECTION INTRAMUSCULAR; INTRAVENOUS; SUBCUTANEOUS
Status: COMPLETED
Start: 2025-08-29 | End: 2025-08-29

## 2025-08-29 RX ORDER — METHYLPREDNISOLONE SODIUM SUCCINATE 125 MG/2ML
60 INJECTION INTRAMUSCULAR; INTRAVENOUS ONCE
Status: COMPLETED | OUTPATIENT
Start: 2025-08-29 | End: 2025-08-29

## 2025-08-29 RX ORDER — SODIUM CHLORIDE 9 MG/ML
20 INJECTION, SOLUTION INTRAVENOUS ONCE
Status: COMPLETED | OUTPATIENT
Start: 2025-08-29 | End: 2025-08-29

## 2025-08-29 RX ORDER — ACETAMINOPHEN 500 MG
1000 TABLET ORAL ONCE
Status: COMPLETED | OUTPATIENT
Start: 2025-08-29 | End: 2025-08-29

## 2025-08-29 RX ADMIN — WATER 2 ML: 1 INJECTION INTRAMUSCULAR; INTRAVENOUS; SUBCUTANEOUS at 14:02

## 2025-08-29 RX ADMIN — SODIUM CHLORIDE 25 MG: 9 INJECTION, SOLUTION INTRAVENOUS at 14:04

## 2025-08-29 RX ADMIN — SODIUM CHLORIDE 20 ML/HR: 9 INJECTION, SOLUTION INTRAVENOUS at 13:51

## 2025-08-29 RX ADMIN — BORTEXOMIB 2.5 MG: 3.5 INJECTION, POWDER, LYOPHILIZED, FOR SOLUTION INTRAVENOUS; SUBCUTANEOUS at 15:23

## 2025-08-29 RX ADMIN — ACETAMINOPHEN 1000 MG: 500 TABLET ORAL at 13:58

## 2025-08-29 RX ADMIN — METHYLPREDNISOLONE SODIUM SUCCINATE 60 MG: 125 INJECTION INTRAMUSCULAR; INTRAVENOUS at 14:02

## (undated) DEVICE — 3M™ STERI-DRAPE™ INSTRUMENT POUCH 1018: Brand: STERI-DRAPE™

## (undated) DEVICE — ELECTRD BLD EDGE/INSUL1P 2.4X5.1MM STRL

## (undated) DEVICE — TRAP,MUCUS SPECIMEN,40CC: Brand: MEDLINE

## (undated) DEVICE — TOOL 14MH30 LEGEND 14CM 3MM: Brand: MIDAS REX ™

## (undated) DEVICE — AIRWY SZ11

## (undated) DEVICE — CANNULA,OXY,ADULT,SUPERSOFT,W/7'TUB,UC: Brand: MEDLINE

## (undated) DEVICE — ENCORE® LATEX MICRO SIZE 8, STERILE LATEX POWDER-FREE SURGICAL GLOVE: Brand: ENCORE

## (undated) DEVICE — MEDI-VAC NON-CONDUCTIVE SUCTION TUBING: Brand: CARDINAL HEALTH

## (undated) DEVICE — APPL CHLORAPREP W/TINT 26ML BLU

## (undated) DEVICE — NEURO SPONGES: Brand: DEROYAL

## (undated) DEVICE — FLTR HME STR UNIV W/SMPL PORT

## (undated) DEVICE — SHEET,DRAPE,40X58,STERILE: Brand: MEDLINE

## (undated) DEVICE — GLV SURG BIOGEL LTX PF 8

## (undated) DEVICE — SUT VIC 2/0 CP2 CR8 18IN J762D

## (undated) DEVICE — NDL SPINE 22G 31/2IN BLK

## (undated) DEVICE — SOL IRR H2O BTL 1000ML STRL

## (undated) DEVICE — SPHR MARKR STEALTH STATION

## (undated) DEVICE — JP PERF DRN SIL FLT 10MM FULL: Brand: CARDINAL HEALTH

## (undated) DEVICE — TB SXN FRAZIER 12F STRL

## (undated) DEVICE — TB SXN FRAZIER 10F STRL

## (undated) DEVICE — SPNG GZ WOVN 4X4IN 12PLY 10/BX STRL

## (undated) DEVICE — SPNG LAP PREWSH SFTPK 18X18IN STRL PK/5

## (undated) DEVICE — ANTIBACTERIAL UNDYED BRAIDED (POLYGLACTIN 910), SYNTHETIC ABSORBABLE SUTURE: Brand: COATED VICRYL

## (undated) DEVICE — 2963 MEDIPORE SOFT CLOTH TAPE 3 IN X 10 YD 12 RLS/CS: Brand: 3M™ MEDIPORE™

## (undated) DEVICE — SUT VIC 3/0 SH CR8 18IN J864D

## (undated) DEVICE — MAGNETIC DRAPE: Brand: DEVON

## (undated) DEVICE — SUCTION CANISTER, 2500CC, RIGID: Brand: DEROYAL

## (undated) DEVICE — ELECTRD BLD EXT EDGE/INSUL 1P 4IN

## (undated) DEVICE — PK NEURO DISC 10

## (undated) DEVICE — JACKSON TABLE POSITIONER KIT: Brand: MEDLINE INDUSTRIES, INC.

## (undated) DEVICE — NDL HYPO ECLPS SFTY 25G 1 1/2IN

## (undated) DEVICE — JACKSON-PRATT 100CC BULB RESERVOIR: Brand: CARDINAL HEALTH

## (undated) DEVICE — DISPOSABLE BIPOLAR FORCEPS 7 3/4" (19.7CM) SCOVILLE BAYONET, INSULATED, 1.5MM TIP AND 12 FT. (3.6M) CABLE: Brand: KIRWAN

## (undated) DEVICE — SYS SKIN CLS DERMABOND PRINEO W/22CM MESH TP

## (undated) DEVICE — NON-ADHERENT PAD: Brand: TELFA

## (undated) DEVICE — SYR CONTRL LUERLOK 10CC

## (undated) DEVICE — SUT PROLN 6/0 BV1 D/A 30IN 8709H

## (undated) DEVICE — PROXIMATE RH ROTATING HEAD SKIN STAPLERS (35 WIDE) CONTAINS 35 STAINLESS STEEL STAPLES: Brand: PROXIMATE

## (undated) DEVICE — SNAP KOVER: Brand: UNBRANDED

## (undated) DEVICE — MEDI-VAC YANKAUER SUCTION HANDLE W/BULBOUS TIP: Brand: CARDINAL HEALTH